# Patient Record
Sex: FEMALE | Race: WHITE | NOT HISPANIC OR LATINO | Employment: PART TIME | URBAN - NONMETROPOLITAN AREA
[De-identification: names, ages, dates, MRNs, and addresses within clinical notes are randomized per-mention and may not be internally consistent; named-entity substitution may affect disease eponyms.]

---

## 2017-02-17 ENCOUNTER — OFFICE VISIT (OUTPATIENT)
Dept: CHIROPRACTIC MEDICINE | Facility: OTHER | Age: 57
End: 2017-02-17
Attending: CHIROPRACTOR
Payer: COMMERCIAL

## 2017-02-17 DIAGNOSIS — M99.03 SEGMENTAL AND SOMATIC DYSFUNCTION OF LUMBAR REGION: Primary | ICD-10-CM

## 2017-02-17 DIAGNOSIS — M54.50 ACUTE BILATERAL LOW BACK PAIN WITHOUT SCIATICA: ICD-10-CM

## 2017-02-17 DIAGNOSIS — M99.02 SEGMENTAL AND SOMATIC DYSFUNCTION OF THORACIC REGION: ICD-10-CM

## 2017-02-17 PROCEDURE — 98940 CHIROPRACT MANJ 1-2 REGIONS: CPT | Mod: AT | Performed by: CHIROPRACTOR

## 2017-02-17 NOTE — MR AVS SNAPSHOT
"              After Visit Summary   2017    Niesha Lyle    MRN: [id number]           Patient Information     Date Of Birth          1960        Visit Information        Provider Department      2017 10:50 AM Felipe Morgan DC  [facility] [place]        Today's Diagnoses     Segmental and somatic dysfunction of lumbar region    -  1    Acute bilateral low back pain without sciatica        Segmental and somatic dysfunction of thoracic region           Follow-ups after your visit        Who to contact     If you have questions or need follow up information about today's clinic visit or your schedule please contact  [facility] [place] directly at [phone].  Normal or non-critical lab and imaging results will be communicated to you by Sootoo.comhart, letter or phone within 4 business days after the clinic has received the results. If you do not hear from us within 7 days, please contact the clinic through Sootoo.comhart or phone. If you have a critical or abnormal lab result, we will notify you by phone as soon as possible.  Submit refill requests through miiCard or call your pharmacy and they will forward the refill request to us. Please allow 3 business days for your refill to be completed.          Additional Information About Your Visit        MyChart Information     miiCard lets you send messages to your doctor, view your test results, renew your prescriptions, schedule appointments and more. To sign up, go to [email or web address] . Click on \"Log in\" on the left side of the screen, which will take you to the Welcome page. Then click on \"Sign up Now\" on the right side of the page.     You will be asked to enter the access code listed below, as well as some personal information. Please follow the directions to create your username and password.     Your access code is: [email or web address]  Expires: 2017  8:05 AM     Your access code will  in 90 days. If you need help or a new code, please " call your [place] clinic or [phone].        Care EveryWhere ID     This is your Care EveryWhere ID. This could be used by other organizations to access your [place] medical records  [id number]         Blood Pressure from Last 3 Encounters:   08/27/16 (!) 170/102   08/24/16 (!) 166/101   10/06/15 155/95    Weight from Last 3 Encounters:   03/16/15 162 lb (73.5 kg)              We Performed the Following     CHIROPRAC MANIP,SPINAL,1-2 REGIONS        Primary Care Provider Office Phone # Fax #    Cedric Giles [phone]       [facility] [address] [facility] [address]        Thank you!     Thank you for choosing  [facility]  for your care. Our goal is always to provide you with excellent care. Hearing back from our patients is one way we can continue to improve our services. Please take a few minutes to complete the written survey that you may receive in the mail after your visit with us. Thank you!             Your Updated Medication List - Protect others around you: Learn how to safely use, store and throw away your medicines at www.disposemymeds.org.          This list is accurate as of: 2/17/17 11:59 PM.  Always use your most recent med list.                   Brand Name Dispense Instructions for use    ATENOLOL PO      Take 25 mg by mouth daily       CALCIUM + D PO      Take 1 tablet by mouth daily       CYMBALTA PO      Take by mouth daily       fluticasone 50 MCG/ACT spray    FLONASE    1 Package    Spray 2 sprays into both nostrils daily       HYDROcodone-acetaminophen 5-325 MG per tablet    NORCO    15 tablet    Take 1-2 tablets by mouth every 4 hours as needed for moderate to severe pain       KRILL OIL PO      Take by mouth daily       LISINOPRIL PO      Take 20 mg by mouth daily       SIMVASTATIN PO      Take by mouth At Bedtime       TRAZODONE HCL PO      Take by mouth At Bedtime       TYLENOL PO      Take 1,300 mg by mouth

## 2017-02-21 NOTE — PROGRESS NOTES
Subjective Finding:    Chief compalint: Patient presents with:  Back Pain  , Pain Scale: 4/10, Intensity: dull and ache, Duration: 1 weeks, Radiating: no.    Date of injury:     Activities that the pain restricts:   Home/household/hobbies/social activities: yes.  Work duties: no.  Sleep: no.  Makes symptoms better: rest.  Makes symptoms worse: activity and lumbar flexion.  Have you seen anyone else for the symptoms? no.  Work related: no.  Automobile related injury: no.    Objective and Assessment:    Posture Analysis:   High shoulder: .  Head tilt: .  High iliac crest: .  Head carriage: neutral.  Thoracic Kyphosis: neutral.  Lumbar Lordosis: forward.    Lumbar Range of Motion: extension decreased, left lateral flexion decreased and right lateral flexion decreased.  Cervical Range of Motion: .  Thoracic Range of Motion: extension decreased.  Extremity Range of Motion: .    Palpation:   Quad lumb: bilateral, referred pain: no    Segmental dysfunction pre-treatment and treatment area: T3, L3, L4 and L5.    Assessment post-treatment:  Cervical: .  Thoracic: ROM increased.  Lumbar: ROM increased and pain and tenderness decreased.    Comments: .      Complicating Factors: .    Plan / Procedure:    Treatment plan: PRN.  Instructed patient: stretch as instructed at visit and walk 10 minutes.  Short term goals: increase ROM.  Long term goals: restore normal function.  Prognosis: good.

## 2017-02-23 ENCOUNTER — OFFICE VISIT (OUTPATIENT)
Dept: CHIROPRACTIC MEDICINE | Facility: OTHER | Age: 57
End: 2017-02-23
Attending: CHIROPRACTOR
Payer: COMMERCIAL

## 2017-02-23 DIAGNOSIS — M54.50 ACUTE RIGHT-SIDED LOW BACK PAIN WITHOUT SCIATICA: ICD-10-CM

## 2017-02-23 DIAGNOSIS — M99.02 SEGMENTAL AND SOMATIC DYSFUNCTION OF THORACIC REGION: ICD-10-CM

## 2017-02-23 DIAGNOSIS — M99.03 SEGMENTAL AND SOMATIC DYSFUNCTION OF LUMBAR REGION: Primary | ICD-10-CM

## 2017-02-23 PROCEDURE — 98940 CHIROPRACT MANJ 1-2 REGIONS: CPT | Mod: AT | Performed by: CHIROPRACTOR

## 2017-02-23 NOTE — MR AVS SNAPSHOT
"              After Visit Summary   2017    Niesha Lyle    MRN: [id number]           Patient Information     Date Of Birth          1960        Visit Information        Provider Department      2017 2:10 PM Felipe Morgan DC  [facility]        Today's Diagnoses     Segmental and somatic dysfunction of lumbar region    -  1    Acute right-sided low back pain without sciatica        Segmental and somatic dysfunction of thoracic region           Follow-ups after your visit        Who to contact     If you have questions or need follow up information about today's clinic visit or your schedule please contact  [facility] directly at [phone].  Normal or non-critical lab and imaging results will be communicated to you by Denali Medicalhart, letter or phone within 4 business days after the clinic has received the results. If you do not hear from us within 7 days, please contact the clinic through Denali Medicalhart or phone. If you have a critical or abnormal lab result, we will notify you by phone as soon as possible.  Submit refill requests through Roll20 or call your pharmacy and they will forward the refill request to us. Please allow 3 business days for your refill to be completed.          Additional Information About Your Visit        MyChart Information     Roll20 lets you send messages to your doctor, view your test results, renew your prescriptions, schedule appointments and more. To sign up, go to [email or web address] . Click on \"Log in\" on the left side of the screen, which will take you to the Welcome page. Then click on \"Sign up Now\" on the right side of the page.     You will be asked to enter the access code listed below, as well as some personal information. Please follow the directions to create your username and password.     Your access code is: [email or web address]  Expires: 2017  8:05 AM     Your access code will  in 90 days. If you need help or a new code, please " call your [place] clinic or [phone].        Care EveryWhere ID     This is your Care EveryWhere ID. This could be used by other organizations to access your [place] medical records  [id number]         Blood Pressure from Last 3 Encounters:   08/27/16 (!) 170/102   08/24/16 (!) 166/101   10/06/15 155/95    Weight from Last 3 Encounters:   03/16/15 162 lb (73.5 kg)              We Performed the Following     CHIROPRAC MANIP,SPINAL,1-2 REGIONS        Primary Care Provider Office Phone # Fax #    Cedric Giles [phone]       [facility] [address] [facility] [address]        Thank you!     Thank you for choosing  [facility]  for your care. Our goal is always to provide you with excellent care. Hearing back from our patients is one way we can continue to improve our services. Please take a few minutes to complete the written survey that you may receive in the mail after your visit with us. Thank you!             Your Updated Medication List - Protect others around you: Learn how to safely use, store and throw away your medicines at www.disposemymeds.org.          This list is accurate as of: 2/23/17 11:59 PM.  Always use your most recent med list.                   Brand Name Dispense Instructions for use    ATENOLOL PO      Take 25 mg by mouth daily       CALCIUM + D PO      Take 1 tablet by mouth daily       CYMBALTA PO      Take by mouth daily       fluticasone 50 MCG/ACT spray    FLONASE    1 Package    Spray 2 sprays into both nostrils daily       HYDROcodone-acetaminophen 5-325 MG per tablet    NORCO    15 tablet    Take 1-2 tablets by mouth every 4 hours as needed for moderate to severe pain       KRILL OIL PO      Take by mouth daily       LISINOPRIL PO      Take 20 mg by mouth daily       SIMVASTATIN PO      Take by mouth At Bedtime       TRAZODONE HCL PO      Take by mouth At Bedtime       TYLENOL PO      Take 1,300 mg by mouth

## 2017-02-24 NOTE — PROGRESS NOTES
Subjective Finding:    Chief compalint: Patient presents with:  Back Pain: right sided  , Pain Scale: 4/10, Intensity: dull and ache, Duration: 1 weeks, Radiating: no.    Date of injury:     Activities that the pain restricts:   Home/household/hobbies/social activities: yes.  Work duties: no.  Sleep: no.  Makes symptoms better: rest.  Makes symptoms worse: activity and lumbar flexion.  Have you seen anyone else for the symptoms? no.  Work related: no.  Automobile related injury: no.    Objective and Assessment:    Posture Analysis:   High shoulder: .  Head tilt: .  High iliac crest: .  Head carriage: neutral.  Thoracic Kyphosis: neutral.  Lumbar Lordosis: forward.    Lumbar Range of Motion: extension decreased, left lateral flexion decreased and right lateral flexion decreased.  Cervical Range of Motion: .  Thoracic Range of Motion: extension decreased.  Extremity Range of Motion: .    Palpation:   Quad lumb: bilateral, referred pain: no    Segmental dysfunction pre-treatment and treatment area: T3, L3, L4 and L5.    Assessment post-treatment:  Cervical: .  Thoracic: ROM increased.  Lumbar: ROM increased and pain and tenderness decreased.    Comments: .      Complicating Factors: .    Plan / Procedure:    Treatment plan: PRN.  Instructed patient: stretch as instructed at visit and walk 10 minutes.  Short term goals: increase ROM.  Long term goals: restore normal function.  Prognosis: good.

## 2017-03-02 ENCOUNTER — OFFICE VISIT (OUTPATIENT)
Dept: CHIROPRACTIC MEDICINE | Facility: OTHER | Age: 57
End: 2017-03-02
Attending: CHIROPRACTOR
Payer: COMMERCIAL

## 2017-03-02 DIAGNOSIS — M54.41 ACUTE RIGHT-SIDED LOW BACK PAIN WITH RIGHT-SIDED SCIATICA: ICD-10-CM

## 2017-03-02 DIAGNOSIS — M99.03 SEGMENTAL AND SOMATIC DYSFUNCTION OF LUMBAR REGION: Primary | ICD-10-CM

## 2017-03-02 DIAGNOSIS — M99.02 SEGMENTAL AND SOMATIC DYSFUNCTION OF THORACIC REGION: ICD-10-CM

## 2017-03-02 PROCEDURE — 98940 CHIROPRACT MANJ 1-2 REGIONS: CPT | Performed by: CHIROPRACTOR

## 2017-03-02 NOTE — MR AVS SNAPSHOT
"              After Visit Summary   3/2/2017    Niesha Lyle    MRN: [id number]           Patient Information     Date Of Birth          1960        Visit Information        Provider Department      3/2/2017 2:00 PM Felipe Morgan DC  [facility]        Today's Diagnoses     Segmental and somatic dysfunction of lumbar region    -  1    Acute right-sided low back pain with right-sided sciatica        Segmental and somatic dysfunction of thoracic region           Follow-ups after your visit        Who to contact     If you have questions or need follow up information about today's clinic visit or your schedule please contact  [facility] directly at [phone].  Normal or non-critical lab and imaging results will be communicated to you by NeurOpticshart, letter or phone within 4 business days after the clinic has received the results. If you do not hear from us within 7 days, please contact the clinic through NeurOpticshart or phone. If you have a critical or abnormal lab result, we will notify you by phone as soon as possible.  Submit refill requests through Booker or call your pharmacy and they will forward the refill request to us. Please allow 3 business days for your refill to be completed.          Additional Information About Your Visit        MyChart Information     Booker lets you send messages to your doctor, view your test results, renew your prescriptions, schedule appointments and more. To sign up, go to [email or web address] . Click on \"Log in\" on the left side of the screen, which will take you to the Welcome page. Then click on \"Sign up Now\" on the right side of the page.     You will be asked to enter the access code listed below, as well as some personal information. Please follow the directions to create your username and password.     Your access code is: [email or web address]  Expires: 2017  8:05 AM     Your access code will  in 90 days. If you need help or a new code, " please call your [place] clinic or [phone].        Care EveryWhere ID     This is your Care EveryWhere ID. This could be used by other organizations to access your [place] medical records  [id number]         Blood Pressure from Last 3 Encounters:   08/27/16 (!) 170/102   08/24/16 (!) 166/101   10/06/15 155/95    Weight from Last 3 Encounters:   03/16/15 162 lb (73.5 kg)              We Performed the Following     CHIROPRAC MANIP,SPINAL,1-2 REGIONS        Primary Care Provider Office Phone # Fax #    Cedric Giles [phone]       [facility] [address] [facility] [address]        Thank you!     Thank you for choosing  [facility]  for your care. Our goal is always to provide you with excellent care. Hearing back from our patients is one way we can continue to improve our services. Please take a few minutes to complete the written survey that you may receive in the mail after your visit with us. Thank you!             Your Updated Medication List - Protect others around you: Learn how to safely use, store and throw away your medicines at www.disposemymeds.org.          This list is accurate as of: 3/2/17 11:59 PM.  Always use your most recent med list.                   Brand Name Dispense Instructions for use    ATENOLOL PO      Take 25 mg by mouth daily       CALCIUM + D PO      Take 1 tablet by mouth daily       CYMBALTA PO      Take by mouth daily       fluticasone 50 MCG/ACT spray    FLONASE    1 Package    Spray 2 sprays into both nostrils daily       HYDROcodone-acetaminophen 5-325 MG per tablet    NORCO    15 tablet    Take 1-2 tablets by mouth every 4 hours as needed for moderate to severe pain       KRILL OIL PO      Take by mouth daily       LISINOPRIL PO      Take 20 mg by mouth daily       SIMVASTATIN PO      Take by mouth At Bedtime       TRAZODONE HCL PO      Take by mouth At Bedtime       TYLENOL PO      Take 1,300 mg by mouth

## 2017-03-13 ENCOUNTER — OFFICE VISIT (OUTPATIENT)
Dept: CHIROPRACTIC MEDICINE | Facility: OTHER | Age: 57
End: 2017-03-13
Attending: CHIROPRACTOR
Payer: COMMERCIAL

## 2017-03-13 DIAGNOSIS — M99.02 SEGMENTAL AND SOMATIC DYSFUNCTION OF THORACIC REGION: ICD-10-CM

## 2017-03-13 DIAGNOSIS — M54.41 ACUTE RIGHT-SIDED LOW BACK PAIN WITH RIGHT-SIDED SCIATICA: ICD-10-CM

## 2017-03-13 DIAGNOSIS — M99.03 SEGMENTAL AND SOMATIC DYSFUNCTION OF LUMBAR REGION: Primary | ICD-10-CM

## 2017-03-13 PROCEDURE — 98940 CHIROPRACT MANJ 1-2 REGIONS: CPT | Performed by: CHIROPRACTOR

## 2017-03-13 NOTE — MR AVS SNAPSHOT
"              After Visit Summary   3/13/2017    Niesha Lyle    MRN: [id number]           Patient Information     Date Of Birth          1960        Visit Information        Provider Department      3/13/2017 11:20 AM Felipe Morgan DC  [facility] [place]        Today's Diagnoses     Segmental and somatic dysfunction of lumbar region    -  1    Acute right-sided low back pain with right-sided sciatica        Segmental and somatic dysfunction of thoracic region           Follow-ups after your visit        Who to contact     If you have questions or need follow up information about today's clinic visit or your schedule please contact  [facility] directly at [phone].  Normal or non-critical lab and imaging results will be communicated to you by Experts 911hart, letter or phone within 4 business days after the clinic has received the results. If you do not hear from us within 7 days, please contact the clinic through Experts 911hart or phone. If you have a critical or abnormal lab result, we will notify you by phone as soon as possible.  Submit refill requests through Caarbon or call your pharmacy and they will forward the refill request to us. Please allow 3 business days for your refill to be completed.          Additional Information About Your Visit        MyChart Information     Caarbon lets you send messages to your doctor, view your test results, renew your prescriptions, schedule appointments and more. To sign up, go to [email or web address] . Click on \"Log in\" on the left side of the screen, which will take you to the Welcome page. Then click on \"Sign up Now\" on the right side of the page.     You will be asked to enter the access code listed below, as well as some personal information. Please follow the directions to create your username and password.     Your access code is: [email or web address]  Expires: 2017  9:05 AM     Your access code will  in 90 days. If you need help or a new " code, please call your [place] clinic or [phone].        Care EveryWhere ID     This is your Care EveryWhere ID. This could be used by other organizations to access your [place] medical records  [id number]         Blood Pressure from Last 3 Encounters:   08/27/16 (!) 170/102   08/24/16 (!) 166/101   10/06/15 155/95    Weight from Last 3 Encounters:   03/16/15 162 lb (73.5 kg)              We Performed the Following     CHIROPRAC MANIP,SPINAL,1-2 REGIONS        Primary Care Provider Office Phone # Fax #    Cedric Giles [phone]       [facility] [address] [facility] [address]        Thank you!     Thank you for choosing  [facility]  for your care. Our goal is always to provide you with excellent care. Hearing back from our patients is one way we can continue to improve our services. Please take a few minutes to complete the written survey that you may receive in the mail after your visit with us. Thank you!             Your Updated Medication List - Protect others around you: Learn how to safely use, store and throw away your medicines at www.disposemymeds.org.          This list is accurate as of: 3/13/17 11:59 PM.  Always use your most recent med list.                   Brand Name Dispense Instructions for use    ATENOLOL PO      Take 25 mg by mouth daily       CALCIUM + D PO      Take 1 tablet by mouth daily       CYMBALTA PO      Take by mouth daily       fluticasone 50 MCG/ACT spray    FLONASE    1 Package    Spray 2 sprays into both nostrils daily       HYDROcodone-acetaminophen 5-325 MG per tablet    NORCO    15 tablet    Take 1-2 tablets by mouth every 4 hours as needed for moderate to severe pain       KRILL OIL PO      Take by mouth daily       LISINOPRIL PO      Take 20 mg by mouth daily       SIMVASTATIN PO      Take by mouth At Bedtime       TRAZODONE HCL PO      Take by mouth At Bedtime       TYLENOL PO      Take 1,300 mg by mouth

## 2017-03-15 ENCOUNTER — OFFICE VISIT (OUTPATIENT)
Dept: CHIROPRACTIC MEDICINE | Facility: OTHER | Age: 57
End: 2017-03-15
Attending: CHIROPRACTOR
Payer: COMMERCIAL

## 2017-03-15 DIAGNOSIS — M99.02 SEGMENTAL AND SOMATIC DYSFUNCTION OF THORACIC REGION: ICD-10-CM

## 2017-03-15 DIAGNOSIS — M54.50 ACUTE RIGHT-SIDED LOW BACK PAIN WITHOUT SCIATICA: ICD-10-CM

## 2017-03-15 DIAGNOSIS — M99.03 SEGMENTAL AND SOMATIC DYSFUNCTION OF LUMBAR REGION: Primary | ICD-10-CM

## 2017-03-15 PROCEDURE — 98940 CHIROPRACT MANJ 1-2 REGIONS: CPT | Mod: AT | Performed by: CHIROPRACTOR

## 2017-03-15 NOTE — MR AVS SNAPSHOT
"              After Visit Summary   3/15/2017    Niesha Lyle    MRN: [id number]           Patient Information     Date Of Birth          1960        Visit Information        Provider Department      3/15/2017 8:40 AM Felipe Morgan DC [facility]        Today's Diagnoses     Segmental and somatic dysfunction of lumbar region    -  1    Acute right-sided low back pain without sciatica        Segmental and somatic dysfunction of thoracic region           Follow-ups after your visit        Your next 10 appointments already scheduled     Mar 17, 2017  8:50 AM CDT   Return Visit with TIERA Juarez ([facility])    [address]  [facility] [address]   [phone]              Who to contact     If you have questions or need follow up information about today's clinic visit or your schedule please contact  [facility] directly at [phone].  Normal or non-critical lab and imaging results will be communicated to you by MyChart, letter or phone within 4 business days after the clinic has received the results. If you do not hear from us within 7 days, please contact the clinic through Unyqehart or phone. If you have a critical or abnormal lab result, we will notify you by phone as soon as possible.  Submit refill requests through Cherrish or call your pharmacy and they will forward the refill request to us. Please allow 3 business days for your refill to be completed.          Additional Information About Your Visit        MyChart Information     Cherrish lets you send messages to your doctor, view your test results, renew your prescriptions, schedule appointments and more. To sign up, go to [email or web address] . Click on \"Log in\" on the left side of the screen, which will take you to the Welcome page. Then click on \"Sign up Now\" on the right side of the page.     You will be asked to enter the access code listed below, as well as some personal " information. Please follow the directions to create your username and password.     Your access code is: [email or web address]  Expires: 2017  9:05 AM     Your access code will  in 90 days. If you need help or a new code, please call your [place] clinic or [phone].        Care EveryWhere ID     This is your Care EveryWhere ID. This could be used by other organizations to access your [place] medical records  [id number]         Blood Pressure from Last 3 Encounters:   16 (!) 170/102   16 (!) 166/101   10/06/15 155/95    Weight from Last 3 Encounters:   03/16/15 162 lb (73.5 kg)              We Performed the Following     CHIROPRAC MANIP,SPINAL,1-2 REGIONS        Primary Care Provider Office Phone # Fax #    Cedric Giles [phone]       [facility] [address] [place] [address]        Thank you!     Thank you for choosing  [facility]  for your care. Our goal is always to provide you with excellent care. Hearing back from our patients is one way we can continue to improve our services. Please take a few minutes to complete the written survey that you may receive in the mail after your visit with us. Thank you!             Your Updated Medication List - Protect others around you: Learn how to safely use, store and throw away your medicines at www.disposemymeds.org.          This list is accurate as of: 3/15/17  2:25 PM.  Always use your most recent med list.                   Brand Name Dispense Instructions for use    ATENOLOL PO      Take 25 mg by mouth daily       CALCIUM + D PO      Take 1 tablet by mouth daily       CYMBALTA PO      Take by mouth daily       fluticasone 50 MCG/ACT spray    FLONASE    1 Package    Spray 2 sprays into both nostrils daily       HYDROcodone-acetaminophen 5-325 MG per tablet    NORCO    15 tablet    Take 1-2 tablets by mouth every 4 hours as needed for moderate to severe pain       KRILL OIL PO      Take by mouth daily        LISINOPRIL PO      Take 20 mg by mouth daily       SIMVASTATIN PO      Take by mouth At Bedtime       TRAZODONE HCL PO      Take by mouth At Bedtime       TYLENOL PO      Take 1,300 mg by mouth

## 2017-03-15 NOTE — PROGRESS NOTES
Subjective Finding:    Chief compalint: Patient presents with:  Back Pain: right leg  , Pain Scale: 4/10, Intensity: dull and ache, Duration: 1 weeks, Radiating: no.    Date of injury:     Activities that the pain restricts:   Home/household/hobbies/social activities: yes.  Work duties: no.  Sleep: no.  Makes symptoms better: rest.  Makes symptoms worse: activity and lumbar flexion.  Have you seen anyone else for the symptoms? no.  Work related: no.  Automobile related injury: no.    Objective and Assessment:    Posture Analysis:   High shoulder: .  Head tilt: .  High iliac crest: .  Head carriage: neutral.  Thoracic Kyphosis: neutral.  Lumbar Lordosis: forward.    Lumbar Range of Motion: extension decreased, left lateral flexion decreased and right lateral flexion decreased.  Cervical Range of Motion: .  Thoracic Range of Motion: extension decreased.  Extremity Range of Motion: .    Palpation:   Quad lumb: bilateral, referred pain: no    Segmental dysfunction pre-treatment and treatment area: T3, L3, L4 and L5.    Assessment post-treatment:  Cervical: .  Thoracic: ROM increased.  Lumbar: ROM increased and pain and tenderness decreased.    Comments: .      Complicating Factors: .    Plan / Procedure:    Treatment plan: PRN.  Instructed patient: stretch as instructed at visit and walk 10 minutes.  Short term goals: increase ROM.  Long term goals: restore normal function.  Prognosis: good.

## 2017-03-17 ENCOUNTER — OFFICE VISIT (OUTPATIENT)
Dept: CHIROPRACTIC MEDICINE | Facility: OTHER | Age: 57
End: 2017-03-17
Attending: CHIROPRACTOR
Payer: COMMERCIAL

## 2017-03-17 DIAGNOSIS — M99.02 SEGMENTAL AND SOMATIC DYSFUNCTION OF THORACIC REGION: ICD-10-CM

## 2017-03-17 DIAGNOSIS — M99.03 SEGMENTAL AND SOMATIC DYSFUNCTION OF LUMBAR REGION: Primary | ICD-10-CM

## 2017-03-17 DIAGNOSIS — M54.41 ACUTE RIGHT-SIDED LOW BACK PAIN WITH RIGHT-SIDED SCIATICA: ICD-10-CM

## 2017-03-17 PROCEDURE — 98940 CHIROPRACT MANJ 1-2 REGIONS: CPT | Performed by: CHIROPRACTOR

## 2017-03-17 NOTE — MR AVS SNAPSHOT
"              After Visit Summary   3/17/2017    Niesha Lyle    MRN: [id number]           Patient Information     Date Of Birth          1960        Visit Information        Provider Department      3/17/2017 8:50 AM Felipe Morgan DC  [facility]        Today's Diagnoses     Segmental and somatic dysfunction of lumbar region    -  1    Acute right-sided low back pain with right-sided sciatica        Segmental and somatic dysfunction of thoracic region           Follow-ups after your visit        Who to contact     If you have questions or need follow up information about today's clinic visit or your schedule please contact  [facility] directly at [phone].  Normal or non-critical lab and imaging results will be communicated to you by [employer], letter or phone within 4 business days after the clinic has received the results. If you do not hear from us within 7 days, please contact the clinic through [employer] or phone. If you have a critical or abnormal lab result, we will notify you by phone as soon as possible.  Submit refill requests through [employer] or call your pharmacy and they will forward the refill request to us. Please allow 3 business days for your refill to be completed.          Additional Information About Your Visit        MyChart Information     [employer] lets you send messages to your doctor, view your test results, renew your prescriptions, schedule appointments and more. To sign up, go to [email or web address] . Click on \"Log in\" on the left side of the screen, which will take you to the Welcome page. Then click on \"Sign up Now\" on the right side of the page.     You will be asked to enter the access code listed below, as well as some personal information. Please follow the directions to create your username and password.     Your access code is: [email or web address]  Expires: 2017  9:05 AM     Your access code will  in 90 days. If you need help or a new " code, please call your [place] clinic or [phone].        Care EveryWhere ID     This is your Care EveryWhere ID. This could be used by other organizations to access your [place] medical records  [id number]         Blood Pressure from Last 3 Encounters:   08/27/16 (!) 170/102   08/24/16 (!) 166/101   10/06/15 155/95    Weight from Last 3 Encounters:   03/16/15 162 lb (73.5 kg)              We Performed the Following     CHIROPRAC MANIP,SPINAL,1-2 REGIONS        Primary Care Provider Office Phone # Fax #    Cedric Giles [phone]       [facility] [address] [facility] [address]        Thank you!     Thank you for choosing  [facility]  for your care. Our goal is always to provide you with excellent care. Hearing back from our patients is one way we can continue to improve our services. Please take a few minutes to complete the written survey that you may receive in the mail after your visit with us. Thank you!             Your Updated Medication List - Protect others around you: Learn how to safely use, store and throw away your medicines at www.disposemymeds.org.          This list is accurate as of: 3/17/17  9:39 AM.  Always use your most recent med list.                   Brand Name Dispense Instructions for use    ATENOLOL PO      Take 25 mg by mouth daily       CALCIUM + D PO      Take 1 tablet by mouth daily       CYMBALTA PO      Take by mouth daily       fluticasone 50 MCG/ACT spray    FLONASE    1 Package    Spray 2 sprays into both nostrils daily       HYDROcodone-acetaminophen 5-325 MG per tablet    NORCO    15 tablet    Take 1-2 tablets by mouth every 4 hours as needed for moderate to severe pain       KRILL OIL PO      Take by mouth daily       LISINOPRIL PO      Take 20 mg by mouth daily       SIMVASTATIN PO      Take by mouth At Bedtime       TRAZODONE HCL PO      Take by mouth At Bedtime       TYLENOL PO      Take 1,300 mg by mouth

## 2017-04-20 DIAGNOSIS — Z12.31 VISIT FOR SCREENING MAMMOGRAM: Primary | ICD-10-CM

## 2017-04-21 ENCOUNTER — MEDICAL CORRESPONDENCE (OUTPATIENT)
Dept: HEALTH INFORMATION MANAGEMENT | Facility: HOSPITAL | Age: 57
End: 2017-04-21

## 2017-04-21 DIAGNOSIS — N64.59 INVERSION OF NIPPLE: ICD-10-CM

## 2017-04-21 DIAGNOSIS — Z12.31 VISIT FOR SCREENING MAMMOGRAM: Primary | ICD-10-CM

## 2017-04-21 PROCEDURE — G0204 DX MAMMO INCL CAD BI: HCPCS | Mod: TC

## 2017-04-21 PROCEDURE — G0279 TOMOSYNTHESIS, MAMMO: HCPCS | Mod: TC

## 2017-05-15 ENCOUNTER — OFFICE VISIT (OUTPATIENT)
Dept: CHIROPRACTIC MEDICINE | Facility: OTHER | Age: 57
End: 2017-05-15
Attending: CHIROPRACTOR
Payer: COMMERCIAL

## 2017-05-15 DIAGNOSIS — M54.50 ACUTE BILATERAL LOW BACK PAIN WITHOUT SCIATICA: ICD-10-CM

## 2017-05-15 DIAGNOSIS — M99.03 SEGMENTAL AND SOMATIC DYSFUNCTION OF LUMBAR REGION: Primary | ICD-10-CM

## 2017-05-15 DIAGNOSIS — M99.02 SEGMENTAL AND SOMATIC DYSFUNCTION OF THORACIC REGION: ICD-10-CM

## 2017-05-15 PROCEDURE — 98940 CHIROPRACT MANJ 1-2 REGIONS: CPT | Mod: AT | Performed by: CHIROPRACTOR

## 2017-05-15 NOTE — MR AVS SNAPSHOT
"              After Visit Summary   5/15/2017    Niesha Lyle    MRN: [id number]           Patient Information     Date Of Birth          1960        Visit Information        Provider Department      5/15/2017 2:00 PM Felipe Morgan DC  [facility]        Today's Diagnoses     Segmental and somatic dysfunction of lumbar region    -  1    Acute bilateral low back pain without sciatica        Segmental and somatic dysfunction of thoracic region           Follow-ups after your visit        Your next 10 appointments already scheduled     May 16, 2017  9:40 AM CDT   Return Visit with Felipe Morgan DC   [facility]ureen Moreno ([facility])    [address]  [facility] [address]   [phone]              Who to contact     If you have questions or need follow up information about today's clinic visit or your schedule please contact  [facility] MAUREEN MORENO directly at [phone].  Normal or non-critical lab and imaging results will be communicated to you by MyChart, letter or phone within 4 business days after the clinic has received the results. If you do not hear from us within 7 days, please contact the clinic through MyChart or phone. If you have a critical or abnormal lab result, we will notify you by phone as soon as possible.  Submit refill requests through [employer] or call your pharmacy and they will forward the refill request to us. Please allow 3 business days for your refill to be completed.          Additional Information About Your Visit        MyChart Information     [employer] lets you send messages to your doctor, view your test results, renew your prescriptions, schedule appointments and more. To sign up, go to [email or web address] . Click on \"Log in\" on the left side of the screen, which will take you to the Welcome page. Then click on \"Sign up Now\" on the right side of the page.     You will be asked to enter the access code listed below, as well as some personal " information. Please follow the directions to create your username and password.     Your access code is: [email or web address]  Expires: 2017  9:05 AM     Your access code will  in 90 days. If you need help or a new code, please call your [place] clinic or [phone].        Care EveryWhere ID     This is your Care EveryWhere ID. This could be used by other organizations to access your [place] medical records  [id number]         Blood Pressure from Last 3 Encounters:   16 (!) 170/102   16 (!) 166/101   10/06/15 155/95    Weight from Last 3 Encounters:   03/16/15 162 lb (73.5 kg)              We Performed the Following     CHIROPRAC MANIP,SPINAL,1-2 REGIONS        Primary Care Provider Office Phone # Fax #    Cedric Giles [phone]       [facility] [address] [place] [address]        Thank you!     Thank you for choosing  [facility]  for your care. Our goal is always to provide you with excellent care. Hearing back from our patients is one way we can continue to improve our services. Please take a few minutes to complete the written survey that you may receive in the mail after your visit with us. Thank you!             Your Updated Medication List - Protect others around you: Learn how to safely use, store and throw away your medicines at www.disposemymeds.org.          This list is accurate as of: 5/15/17 11:59 PM.  Always use your most recent med list.                   Brand Name Dispense Instructions for use    ATENOLOL PO      Take 25 mg by mouth daily       CALCIUM + D PO      Take 1 tablet by mouth daily       CYMBALTA PO      Take by mouth daily       fluticasone 50 MCG/ACT spray    FLONASE    1 Package    Spray 2 sprays into both nostrils daily       HYDROcodone-acetaminophen 5-325 MG per tablet    NORCO    15 tablet    Take 1-2 tablets by mouth every 4 hours as needed for moderate to severe pain       KRILL OIL PO      Take by mouth daily        LISINOPRIL PO      Take 20 mg by mouth daily       SIMVASTATIN PO      Take by mouth At Bedtime       TRAZODONE HCL PO      Take by mouth At Bedtime       TYLENOL PO      Take 1,300 mg by mouth

## 2017-05-16 ENCOUNTER — OFFICE VISIT (OUTPATIENT)
Dept: CHIROPRACTIC MEDICINE | Facility: OTHER | Age: 57
End: 2017-05-16
Attending: CHIROPRACTOR
Payer: COMMERCIAL

## 2017-05-16 DIAGNOSIS — M99.02 SEGMENTAL AND SOMATIC DYSFUNCTION OF THORACIC REGION: ICD-10-CM

## 2017-05-16 DIAGNOSIS — M99.03 SEGMENTAL AND SOMATIC DYSFUNCTION OF LUMBAR REGION: Primary | ICD-10-CM

## 2017-05-16 DIAGNOSIS — M54.50 ACUTE RIGHT-SIDED LOW BACK PAIN WITHOUT SCIATICA: ICD-10-CM

## 2017-05-16 PROCEDURE — 98940 CHIROPRACT MANJ 1-2 REGIONS: CPT | Performed by: CHIROPRACTOR

## 2017-05-16 NOTE — PROGRESS NOTES
Subjective Finding:    Chief compalint: Patient presents with:  Back Pain  , Pain Scale: 1/10, Intensity: dull and ache, Duration: 1 weeks, Radiating: no.    Date of injury:     Activities that the pain restricts:   Home/household/hobbies/social activities: yes.  Work duties: no.  Sleep: no.  Makes symptoms better: rest.  Makes symptoms worse: activity and lumbar flexion.  Have you seen anyone else for the symptoms? no.  Work related: no.  Automobile related injury: no.    Objective and Assessment:    Posture Analysis:   High shoulder: .  Head tilt: .  High iliac crest: .  Head carriage: neutral.  Thoracic Kyphosis: neutral.  Lumbar Lordosis: forward.    Lumbar Range of Motion: extension decreased, left lateral flexion decreased and right lateral flexion decreased.  Cervical Range of Motion: .  Thoracic Range of Motion: extension decreased.  Extremity Range of Motion: .    Palpation:   Quad lumb: bilateral, referred pain: no    Segmental dysfunction pre-treatment and treatment area: T3, L3, L4 and L5.    Assessment post-treatment:  Cervical: .  Thoracic: ROM increased.  Lumbar: ROM increased and pain and tenderness decreased.    Comments: .      Complicating Factors: .    Plan / Procedure:    Treatment plan: PRN.  Instructed patient: stretch as instructed at visit and walk 10 minutes.  Short term goals: increase ROM.  Long term goals: restore normal function.  Prognosis: good.

## 2017-05-16 NOTE — MR AVS SNAPSHOT
"              After Visit Summary   2017    Niesha Lyle    MRN: [id number]           Patient Information     Date Of Birth          1960        Visit Information        Provider Department      2017 9:40 AM Felipe Morgan DC  [facility]        Today's Diagnoses     Segmental and somatic dysfunction of lumbar region    -  1    Acute right-sided low back pain without sciatica        Segmental and somatic dysfunction of thoracic region           Follow-ups after your visit        Who to contact     If you have questions or need follow up information about today's clinic visit or your schedule please contact  [facility] directly at [phone].  Normal or non-critical lab and imaging results will be communicated to you by [employer], letter or phone within 4 business days after the clinic has received the results. If you do not hear from us within 7 days, please contact the clinic through [employer] or phone. If you have a critical or abnormal lab result, we will notify you by phone as soon as possible.  Submit refill requests through [employer] or call your pharmacy and they will forward the refill request to us. Please allow 3 business days for your refill to be completed.          Additional Information About Your Visit        MyChart Information     [employer] lets you send messages to your doctor, view your test results, renew your prescriptions, schedule appointments and more. To sign up, go to [email or web address] . Click on \"Log in\" on the left side of the screen, which will take you to the Welcome page. Then click on \"Sign up Now\" on the right side of the page.     You will be asked to enter the access code listed below, as well as some personal information. Please follow the directions to create your username and password.     Your access code is: [email or web address]  Expires: 2017  9:05 AM     Your access code will  in 90 days. If you need help or a new code, please " call your [place] clinic or [phone].        Care EveryWhere ID     This is your Care EveryWhere ID. This could be used by other organizations to access your [place] medical records  [id number]         Blood Pressure from Last 3 Encounters:   08/27/16 (!) 170/102   08/24/16 (!) 166/101   10/06/15 155/95    Weight from Last 3 Encounters:   03/16/15 162 lb (73.5 kg)              We Performed the Following     CHIROPRAC MANIP,SPINAL,1-2 REGIONS        Primary Care Provider Office Phone # Fax #    Cedric Giles [phone]       [facility] [address] [facility] [address]        Thank you!     Thank you for choosing  [facility]  for your care. Our goal is always to provide you with excellent care. Hearing back from our patients is one way we can continue to improve our services. Please take a few minutes to complete the written survey that you may receive in the mail after your visit with us. Thank you!             Your Updated Medication List - Protect others around you: Learn how to safely use, store and throw away your medicines at www.disposemymeds.org.          This list is accurate as of: 5/16/17 11:59 PM.  Always use your most recent med list.                   Brand Name Dispense Instructions for use    ATENOLOL PO      Take 25 mg by mouth daily       CALCIUM + D PO      Take 1 tablet by mouth daily       CYMBALTA PO      Take by mouth daily       fluticasone 50 MCG/ACT spray    FLONASE    1 Package    Spray 2 sprays into both nostrils daily       HYDROcodone-acetaminophen 5-325 MG per tablet    NORCO    15 tablet    Take 1-2 tablets by mouth every 4 hours as needed for moderate to severe pain       KRILL OIL PO      Take by mouth daily       LISINOPRIL PO      Take 20 mg by mouth daily       SIMVASTATIN PO      Take by mouth At Bedtime       TRAZODONE HCL PO      Take by mouth At Bedtime       TYLENOL PO      Take 1,300 mg by mouth

## 2017-05-17 NOTE — PROGRESS NOTES
Subjective Finding:    Chief compalint: Patient presents with:  Back Pain: stiffness in low back with leg pain  , Pain Scale: 1/10, Intensity: dull and ache, Duration: 1 weeks, Radiating: no.    Date of injury:     Activities that the pain restricts:   Home/household/hobbies/social activities: yes.  Work duties: no.  Sleep: no.  Makes symptoms better: rest.  Makes symptoms worse: activity and lumbar flexion.  Have you seen anyone else for the symptoms? no.  Work related: no.  Automobile related injury: no.    Objective and Assessment:    Posture Analysis:   High shoulder: .  Head tilt: .  High iliac crest: .  Head carriage: neutral.  Thoracic Kyphosis: neutral.  Lumbar Lordosis: forward.    Lumbar Range of Motion: extension decreased, left lateral flexion decreased and right lateral flexion decreased.  Cervical Range of Motion: .  Thoracic Range of Motion: extension decreased.  Extremity Range of Motion: .    Palpation:   Quad lumb: bilateral, referred pain: no    Segmental dysfunction pre-treatment and treatment area: T3, L3, L4 and L5.    Assessment post-treatment:  Cervical: .  Thoracic: ROM increased.  Lumbar: ROM increased and pain and tenderness decreased.    Comments: .      Complicating Factors: .    Plan / Procedure:    Treatment plan: PRN.  Instructed patient: stretch as instructed at visit and walk 10 minutes.  Short term goals: increase ROM.  Long term goals: restore normal function.  Prognosis: good.

## 2017-05-30 ENCOUNTER — OFFICE VISIT (OUTPATIENT)
Dept: CHIROPRACTIC MEDICINE | Facility: OTHER | Age: 57
End: 2017-05-30
Attending: CHIROPRACTOR
Payer: COMMERCIAL

## 2017-05-30 DIAGNOSIS — M54.50 ACUTE BILATERAL LOW BACK PAIN WITHOUT SCIATICA: ICD-10-CM

## 2017-05-30 DIAGNOSIS — M99.02 SEGMENTAL AND SOMATIC DYSFUNCTION OF THORACIC REGION: ICD-10-CM

## 2017-05-30 DIAGNOSIS — M99.03 SEGMENTAL AND SOMATIC DYSFUNCTION OF LUMBAR REGION: Primary | ICD-10-CM

## 2017-05-30 PROCEDURE — 98940 CHIROPRACT MANJ 1-2 REGIONS: CPT | Mod: AT | Performed by: CHIROPRACTOR

## 2017-05-30 NOTE — MR AVS SNAPSHOT
"              After Visit Summary   2017    Niesha Lyle    MRN: [id number]           Patient Information     Date Of Birth          1960        Visit Information        Provider Department      2017 11:30 AM Felipe Morgan DC  [facility]        Today's Diagnoses     Segmental and somatic dysfunction of lumbar region    -  1    Acute bilateral low back pain without sciatica        Segmental and somatic dysfunction of thoracic region           Follow-ups after your visit        Who to contact     If you have questions or need follow up information about today's clinic visit or your schedule please contact  [facility] directly at [phone].  Normal or non-critical lab and imaging results will be communicated to you by [employer], letter or phone within 4 business days after the clinic has received the results. If you do not hear from us within 7 days, please contact the clinic through [employer] or phone. If you have a critical or abnormal lab result, we will notify you by phone as soon as possible.  Submit refill requests through [employer] or call your pharmacy and they will forward the refill request to us. Please allow 3 business days for your refill to be completed.          Additional Information About Your Visit        MyChart Information     [employer] lets you send messages to your doctor, view your test results, renew your prescriptions, schedule appointments and more. To sign up, go to [email or web address] . Click on \"Log in\" on the left side of the screen, which will take you to the Welcome page. Then click on \"Sign up Now\" on the right side of the page.     You will be asked to enter the access code listed below, as well as some personal information. Please follow the directions to create your username and password.     Your access code is: [email or web address]  Expires: 2017  9:18 AM     Your access code will  in 90 days. If you need help or a new code, please " call your [place] clinic or [phone].        Care EveryWhere ID     This is your Care EveryWhere ID. This could be used by other organizations to access your [place] medical records  [id number]         Blood Pressure from Last 3 Encounters:   08/27/16 (!) 170/102   08/24/16 (!) 166/101   10/06/15 155/95    Weight from Last 3 Encounters:   03/16/15 162 lb (73.5 kg)              We Performed the Following     CHIROPRAC MANIP,SPINAL,1-2 REGIONS        Primary Care Provider Office Phone # Fax #    Cedric Giles [phone]       [facility] [address] [facility] [address]        Thank you!     Thank you for choosing  [facility]  for your care. Our goal is always to provide you with excellent care. Hearing back from our patients is one way we can continue to improve our services. Please take a few minutes to complete the written survey that you may receive in the mail after your visit with us. Thank you!             Your Updated Medication List - Protect others around you: Learn how to safely use, store and throw away your medicines at www.disposemymeds.org.          This list is accurate as of: 5/30/17 11:59 PM.  Always use your most recent med list.                   Brand Name Dispense Instructions for use    ATENOLOL PO      Take 25 mg by mouth daily       CALCIUM + D PO      Take 1 tablet by mouth daily       CYMBALTA PO      Take by mouth daily       fluticasone 50 MCG/ACT spray    FLONASE    1 Package    Spray 2 sprays into both nostrils daily       HYDROcodone-acetaminophen 5-325 MG per tablet    NORCO    15 tablet    Take 1-2 tablets by mouth every 4 hours as needed for moderate to severe pain       KRILL OIL PO      Take by mouth daily       LISINOPRIL PO      Take 20 mg by mouth daily       SIMVASTATIN PO      Take by mouth At Bedtime       TRAZODONE HCL PO      Take by mouth At Bedtime       TYLENOL PO      Take 1,300 mg by mouth

## 2017-06-06 ENCOUNTER — OFFICE VISIT (OUTPATIENT)
Dept: CHIROPRACTIC MEDICINE | Facility: OTHER | Age: 57
End: 2017-06-06
Attending: CHIROPRACTOR
Payer: COMMERCIAL

## 2017-06-06 DIAGNOSIS — M99.02 SEGMENTAL AND SOMATIC DYSFUNCTION OF THORACIC REGION: ICD-10-CM

## 2017-06-06 DIAGNOSIS — M54.41 ACUTE RIGHT-SIDED LOW BACK PAIN WITH RIGHT-SIDED SCIATICA: ICD-10-CM

## 2017-06-06 DIAGNOSIS — M99.03 SEGMENTAL AND SOMATIC DYSFUNCTION OF LUMBAR REGION: Primary | ICD-10-CM

## 2017-06-06 PROCEDURE — 98940 CHIROPRACT MANJ 1-2 REGIONS: CPT | Mod: AT | Performed by: CHIROPRACTOR

## 2017-06-06 NOTE — MR AVS SNAPSHOT
"              After Visit Summary   2017    Niesha Lyle    MRN: [id number]           Patient Information     Date Of Birth          1960        Visit Information        Provider Department      2017 1:00 PM Felipe Morgan DC  [facility] [place]        Today's Diagnoses     Segmental and somatic dysfunction of lumbar region    -  1    Acute right-sided low back pain with right-sided sciatica        Segmental and somatic dysfunction of thoracic region           Follow-ups after your visit        Who to contact     If you have questions or need follow up information about today's clinic visit or your schedule please contact  [facility] directly at [phone].  Normal or non-critical lab and imaging results will be communicated to you by Arcivrhart, letter or phone within 4 business days after the clinic has received the results. If you do not hear from us within 7 days, please contact the clinic through Arcivrhart or phone. If you have a critical or abnormal lab result, we will notify you by phone as soon as possible.  Submit refill requests through "[employer]" or call your pharmacy and they will forward the refill request to us. Please allow 3 business days for your refill to be completed.          Additional Information About Your Visit        MyChart Information     "[employer]" lets you send messages to your doctor, view your test results, renew your prescriptions, schedule appointments and more. To sign up, go to [email or web address]" . Click on \"Log in\" on the left side of the screen, which will take you to the Welcome page. Then click on \"Sign up Now\" on the right side of the page.     You will be asked to enter the access code listed below, as well as some personal information. Please follow the directions to create your username and password.     Your access code is: [email or web address]  Expires: 2017  9:18 AM     Your access code will  in 90 days. If you need help or a new code, " please call your [place] clinic or [phone].        Care EveryWhere ID     This is your Care EveryWhere ID. This could be used by other organizations to access your [place] medical records  [id number]         Blood Pressure from Last 3 Encounters:   08/27/16 (!) 170/102   08/24/16 (!) 166/101   10/06/15 155/95    Weight from Last 3 Encounters:   03/16/15 162 lb (73.5 kg)              We Performed the Following     CHIROPRAC MANIP,SPINAL,1-2 REGIONS        Primary Care Provider Office Phone # Fax #    Cedric Giles [phone]       [facility] [address] [facility] [address]        Thank you!     Thank you for choosing  [facility]  for your care. Our goal is always to provide you with excellent care. Hearing back from our patients is one way we can continue to improve our services. Please take a few minutes to complete the written survey that you may receive in the mail after your visit with us. Thank you!             Your Updated Medication List - Protect others around you: Learn how to safely use, store and throw away your medicines at www.disposemymeds.org.          This list is accurate as of: 6/6/17 11:59 PM.  Always use your most recent med list.                   Brand Name Dispense Instructions for use    ATENOLOL PO      Take 25 mg by mouth daily       CALCIUM + D PO      Take 1 tablet by mouth daily       CYMBALTA PO      Take by mouth daily       fluticasone 50 MCG/ACT spray    FLONASE    1 Package    Spray 2 sprays into both nostrils daily       HYDROcodone-acetaminophen 5-325 MG per tablet    NORCO    15 tablet    Take 1-2 tablets by mouth every 4 hours as needed for moderate to severe pain       KRILL OIL PO      Take by mouth daily       LISINOPRIL PO      Take 20 mg by mouth daily       SIMVASTATIN PO      Take by mouth At Bedtime       TRAZODONE HCL PO      Take by mouth At Bedtime       TYLENOL PO      Take 1,300 mg by mouth

## 2017-06-22 ENCOUNTER — OFFICE VISIT (OUTPATIENT)
Dept: CHIROPRACTIC MEDICINE | Facility: OTHER | Age: 57
End: 2017-06-22
Attending: CHIROPRACTOR
Payer: COMMERCIAL

## 2017-06-22 DIAGNOSIS — M99.02 SEGMENTAL AND SOMATIC DYSFUNCTION OF THORACIC REGION: ICD-10-CM

## 2017-06-22 DIAGNOSIS — M99.03 SEGMENTAL AND SOMATIC DYSFUNCTION OF LUMBAR REGION: Primary | ICD-10-CM

## 2017-06-22 DIAGNOSIS — M54.50 ACUTE BILATERAL LOW BACK PAIN WITHOUT SCIATICA: ICD-10-CM

## 2017-06-22 PROCEDURE — 98940 CHIROPRACT MANJ 1-2 REGIONS: CPT | Mod: AT | Performed by: CHIROPRACTOR

## 2017-06-22 NOTE — MR AVS SNAPSHOT
"              After Visit Summary   2017    Niesha Lyle    MRN: [id number]           Patient Information     Date Of Birth          1960        Visit Information        Provider Department      2017 4:00 PM Felipe Morgan DC  [facility] [place]        Today's Diagnoses     Segmental and somatic dysfunction of lumbar region    -  1    Acute bilateral low back pain without sciatica        Segmental and somatic dysfunction of thoracic region           Follow-ups after your visit        Who to contact     If you have questions or need follow up information about today's clinic visit or your schedule please [facility]  [facility] directly at [phone].  Normal or non-critical lab and imaging results will be communicated to you by GottaParkhart, letter or phone within 4 business days after the clinic has received the results. If you do not hear from us within 7 days, please contact the clinic through GottaParkhart or phone. If you have a critical or abnormal lab result, we will notify you by phone as soon as possible.  Submit refill requests through [email or web address] or call your pharmacy and they will forward the refill request to us. Please allow 3 business days for your refill to be completed.          Additional Information About Your Visit        MyChart Information     [email or web address] lets you send messages to your doctor, view your test results, renew your prescriptions, schedule appointments and more. To sign up, go to [email or web address] . Click on \"Log in\" on the left side of the screen, which will take you to the Welcome page. Then click on \"Sign up Now\" on the right side of the page.     You will be asked to enter the access code listed below, as well as some personal information. Please follow the directions to create your username and password.     Your access code is: [email or web address]  Expires: 2017  9:18 AM     Your access code will  in 90 days. If you need help or a new code, please " call your [place] clinic or [phone].        Care EveryWhere ID     This is your Care EveryWhere ID. This could be used by other organizations to access your [place] medical records  [id number]         Blood Pressure from Last 3 Encounters:   08/27/16 (!) 170/102   08/24/16 (!) 166/101   10/06/15 155/95    Weight from Last 3 Encounters:   03/16/15 162 lb (73.5 kg)              We Performed the Following     CHIROPRAC MANIP,SPINAL,1-2 REGIONS        Primary Care Provider Office Phone # Fax #    Cedric Giles [phone]       [facility] [address] [facility] [address]        Equal Access to Services     ARNULFO DEVINE : Hadii aad ku hadasho Soomaali, waaxda luqadaha, qaybta kaalmada adeegyada, janet lucero . So [facility] [phone].    ATENCIÓN: Si habla español, tiene a walker disposición servicios gratuitos de asistencia lingüística. Llame al [phone].    We comply with applicable federal civil rights laws and [place] laws. We do not discriminate on the basis of race, color, national origin, age, disability sex, sexual orientation or gender identity.            Thank you!     Thank you for choosing  [facility]  for your care. Our goal is always to provide you with excellent care. Hearing back from our patients is one way we can continue to improve our services. Please take a few minutes to complete the written survey that you may receive in the mail after your visit with us. Thank you!             Your Updated Medication List - Protect others around you: Learn how to safely use, store and throw away your medicines at www.disposemymeds.org.          This list is accurate as of: 6/22/17 11:59 PM.  Always use your most recent med list.                   Brand Name Dispense Instructions for use Diagnosis    ATENOLOL PO      Take 25 mg by mouth daily        CALCIUM + D PO      Take 1 tablet by mouth daily        CYMBALTA PO      Take by mouth daily         fluticasone 50 MCG/ACT spray    FLONASE    1 Package    Spray 2 sprays into both nostrils daily        HYDROcodone-acetaminophen 5-325 MG per tablet    NORCO    15 tablet    Take 1-2 tablets by mouth every 4 hours as needed for moderate to severe pain        KRILL OIL PO      Take by mouth daily        LISINOPRIL PO      Take 20 mg by mouth daily        SIMVASTATIN PO      Take by mouth At Bedtime        TRAZODONE HCL PO      Take by mouth At Bedtime        TYLENOL PO      Take 1,300 mg by mouth

## 2017-06-27 NOTE — PROGRESS NOTES
Subjective Finding:    Chief compalint: Patient presents with:  Back Pain  Neck Pain  , Pain Scale: 1/10, Intensity: dull and ache, Duration: 1 weeks, Radiating: no.    Date of injury:     Activities that the pain restricts:   Home/household/hobbies/social activities: yes.  Work duties: no.  Sleep: no.  Makes symptoms better: rest.  Makes symptoms worse: activity and lumbar flexion.  Have you seen anyone else for the symptoms? no.  Work related: no.  Automobile related injury: no.    Objective and Assessment:    Posture Analysis:   High shoulder: .  Head tilt: .  High iliac crest: .  Head carriage: neutral.  Thoracic Kyphosis: neutral.  Lumbar Lordosis: forward.    Lumbar Range of Motion: extension decreased, left lateral flexion decreased and right lateral flexion decreased.  Cervical Range of Motion: .  Thoracic Range of Motion: extension decreased.  Extremity Range of Motion: .    Palpation:   Quad lumb: bilateral, referred pain: no    Segmental dysfunction pre-treatment and treatment area: T3, L3, L4 and L5.    Assessment post-treatment:  Cervical: .  Thoracic: ROM increased.  Lumbar: ROM increased and pain and tenderness decreased.    Comments: .      Complicating Factors: .    Plan / Procedure:    Treatment plan: PRN.  Instructed patient: stretch as instructed at visit and walk 10 minutes.  Short term goals: increase ROM.  Long term goals: restore normal function.  Prognosis: good.

## 2017-08-14 ENCOUNTER — OFFICE VISIT (OUTPATIENT)
Dept: CHIROPRACTIC MEDICINE | Facility: OTHER | Age: 57
End: 2017-08-14
Attending: CHIROPRACTOR
Payer: COMMERCIAL

## 2017-08-14 DIAGNOSIS — M99.02 SEGMENTAL AND SOMATIC DYSFUNCTION OF THORACIC REGION: ICD-10-CM

## 2017-08-14 DIAGNOSIS — M99.03 SEGMENTAL AND SOMATIC DYSFUNCTION OF LUMBAR REGION: Primary | ICD-10-CM

## 2017-08-14 DIAGNOSIS — M54.50 ACUTE BILATERAL LOW BACK PAIN WITHOUT SCIATICA: ICD-10-CM

## 2017-08-14 PROCEDURE — 99211 OFF/OP EST MAY X REQ PHY/QHP: CPT | Mod: 25 | Performed by: CHIROPRACTOR

## 2017-08-14 PROCEDURE — 98940 CHIROPRACT MANJ 1-2 REGIONS: CPT | Performed by: CHIROPRACTOR

## 2017-08-14 NOTE — PROGRESS NOTES
Subjective Finding:    Chief compalint: Patient presents with:  Back Pain  , Pain Scale: 1/10, Intensity: dull and ache, Duration: 1 weeks, Radiating: no.    Date of injury:     Activities that the pain restricts:   Home/household/hobbies/social activities: yes.  Work duties: no.  Sleep: no.  Makes symptoms better: rest.  Makes symptoms worse: activity and lumbar flexion.  Have you seen anyone else for the symptoms? no.  Work related: .  Automobile related injury: no.    Objective and Assessment:    Posture Analysis:   High shoulder: .  Head tilt: .  High iliac crest: .  Head carriage: neutral.  Thoracic Kyphosis: neutral.  Lumbar Lordosis: forward.    Lumbar Range of Motion: extension decreased, left lateral flexion decreased and right lateral flexion decreased.  Cervical Range of Motion: .  Thoracic Range of Motion: extension decreased.  Extremity Range of Motion: .    Palpation:   Quad lumb: bilateral, referred pain: no    Segmental dysfunction pre-treatment and treatment area: T3, L3, L4 and L5.    Assessment post-treatment:  Cervical: .  Thoracic: ROM increased.  Lumbar: ROM increased and pain and tenderness decreased.    Comments: .      Complicating Factors: .    Plan / Procedure:    Treatment plan: PRN.  Instructed patient: stretch as instructed at visit and walk 10 minutes.  Short term goals: increase ROM.  Long term goals: restore normal function.  Prognosis: good.

## 2017-08-14 NOTE — MR AVS SNAPSHOT
"              After Visit Summary   2017    Niesha Llye    MRN: [id number]           Patient Information     Date Of Birth          1960        Visit Information        Provider Department      2017 10:30 AM Felipe Morgan DC  [facility] [place]        Today's Diagnoses     Segmental and somatic dysfunction of lumbar region    -  1    Acute bilateral low back pain without sciatica        Segmental and somatic dysfunction of thoracic region           Follow-ups after your visit        Who to contact     If you have questions or need follow up information about today's clinic visit or your schedule please contact  [facility] directly at [phone].  Normal or non-critical lab and imaging results will be communicated to you by ReCyte Therapeuticshart, letter or phone within 4 business days after the clinic has received the results. If you do not hear from us within 7 days, please contact the clinic through ReCyte Therapeuticshart or phone. If you have a critical or abnormal lab result, we will notify you by phone as soon as possible.  Submit refill requests through [email or web address] or call your pharmacy and they will forward the refill request to us. Please allow 3 business days for your refill to be completed.          Additional Information About Your Visit        MyChart Information     [email or web address] lets you send messages to your doctor, view your test results, renew your prescriptions, schedule appointments and more. To sign up, go to [email or web address] . Click on \"Log in\" on the left side of the screen, which will take you to the Welcome page. Then click on \"Sign up Now\" on the right side of the page.     You will be asked to enter the access code listed below, as well as some personal information. Please follow the directions to create your username and password.     Your access code is: [email or web address]  Expires: 2017  9:18 AM     Your access code will  in 90 days. If you need help or a new code, please " call your [place] clinic or [phone].        Care EveryWhere ID     This is your Care EveryWhere ID. This could be used by other organizations to access your [place] medical records  [id number]         Blood Pressure from Last 3 Encounters:   08/27/16 (!) 170/102   08/24/16 (!) 166/101   10/06/15 155/95    Weight from Last 3 Encounters:   03/16/15 162 lb (73.5 kg)              We Performed the Following     CHIROPRAC MANIP,SPINAL,1-2 REGIONS        Primary Care Provider Office Phone # Fax #    Cedric Giles [phone]       [facility] [address] [facility] [address]        Equal Access to Services     ARNULFO DEVINE : Hadii aad ku hadasho Soomaali, waaxda luqadaha, qaybta kaalmada adeegyada, janet lucero . So [facility] [phone].    ATENCIÓN: Si habla español, tiene a walker disposición servicios gratuitos de asistencia lingüística. Llame al [phone].    We comply with applicable federal civil rights laws and [place] laws. We do not discriminate on the basis of race, color, national origin, age, disability sex, sexual orientation or gender identity.            Thank you!     Thank you for choosing  [facility]  for your care. Our goal is always to provide you with excellent care. Hearing back from our patients is one way we can continue to improve our services. Please take a few minutes to complete the written survey that you may receive in the mail after your visit with us. Thank you!             Your Updated Medication List - Protect others around you: Learn how to safely use, store and throw away your medicines at www.disposemymeds.org.          This list is accurate as of: 8/14/17 10:34 AM.  Always use your most recent med list.                   Brand Name Dispense Instructions for use Diagnosis    ATENOLOL PO      Take 25 mg by mouth daily        CALCIUM + D PO      Take 1 tablet by mouth daily        CYMBALTA PO      Take by mouth daily         fluticasone 50 MCG/ACT spray    FLONASE    1 Package    Spray 2 sprays into both nostrils daily        HYDROcodone-acetaminophen 5-325 MG per tablet    NORCO    15 tablet    Take 1-2 tablets by mouth every 4 hours as needed for moderate to severe pain        KRILL OIL PO      Take by mouth daily        LISINOPRIL PO      Take 20 mg by mouth daily        SIMVASTATIN PO      Take by mouth At Bedtime        TRAZODONE HCL PO      Take by mouth At Bedtime        TYLENOL PO      Take 1,300 mg by mouth

## 2017-10-02 ENCOUNTER — HOSPITAL ENCOUNTER (EMERGENCY)
Facility: HOSPITAL | Age: 57
Discharge: HOME | End: 2017-10-02
Attending: NURSE PRACTITIONER | Admitting: NURSE PRACTITIONER
Payer: COMMERCIAL

## 2017-10-02 ENCOUNTER — OFFICE VISIT (OUTPATIENT)
Dept: CHIROPRACTIC MEDICINE | Facility: OTHER | Age: 57
End: 2017-10-02
Attending: CHIROPRACTOR
Payer: COMMERCIAL

## 2017-10-02 VITALS
TEMPERATURE: 98 F | HEART RATE: 77 BPM | RESPIRATION RATE: 16 BRPM | SYSTOLIC BLOOD PRESSURE: 186 MMHG | DIASTOLIC BLOOD PRESSURE: 103 MMHG | OXYGEN SATURATION: 99 %

## 2017-10-02 DIAGNOSIS — J02.0 STREPTOCOCCAL SORE THROAT: ICD-10-CM

## 2017-10-02 DIAGNOSIS — M99.01 SEGMENTAL AND SOMATIC DYSFUNCTION OF CERVICAL REGION: ICD-10-CM

## 2017-10-02 DIAGNOSIS — M54.50 ACUTE RIGHT-SIDED LOW BACK PAIN WITHOUT SCIATICA: ICD-10-CM

## 2017-10-02 DIAGNOSIS — M99.02 SEGMENTAL AND SOMATIC DYSFUNCTION OF THORACIC REGION: ICD-10-CM

## 2017-10-02 DIAGNOSIS — M99.03 SEGMENTAL AND SOMATIC DYSFUNCTION OF LUMBAR REGION: Primary | ICD-10-CM

## 2017-10-02 LAB
DEPRECATED S PYO AG THROAT QL EIA: NORMAL
SPECIMEN SOURCE: NORMAL

## 2017-10-02 PROCEDURE — 87880 STREP A ASSAY W/OPTIC: CPT | Performed by: FAMILY MEDICINE

## 2017-10-02 PROCEDURE — 99213 OFFICE O/P EST LOW 20 MIN: CPT | Performed by: NURSE PRACTITIONER

## 2017-10-02 PROCEDURE — 99213 OFFICE O/P EST LOW 20 MIN: CPT

## 2017-10-02 PROCEDURE — 87081 CULTURE SCREEN ONLY: CPT | Performed by: FAMILY MEDICINE

## 2017-10-02 PROCEDURE — 98941 CHIROPRACT MANJ 3-4 REGIONS: CPT | Mod: AT | Performed by: CHIROPRACTOR

## 2017-10-02 RX ORDER — AMOXICILLIN 500 MG/1
500 CAPSULE ORAL 3 TIMES DAILY
Qty: 30 CAPSULE | Refills: 0 | Status: SHIPPED | OUTPATIENT
Start: 2017-10-02 | End: 2017-10-12

## 2017-10-02 NOTE — MR AVS SNAPSHOT
"              After Visit Summary   10/2/2017    Niesha Lyle    MRN: [id number]           Patient Information     Date Of Birth          1960        Visit Information        Provider Department      10/2/2017 11:10 AM Felipe Morgan DC  [facility] [place] [facility] [place]        Today's Diagnoses     Segmental and somatic dysfunction of lumbar region    -  1    Acute right-sided low back pain without sciatica        Segmental and somatic dysfunction of thoracic region        Segmental and somatic dysfunction of cervical region           Follow-ups after your visit        Who to contact     If you have questions or need follow up information about today's clinic visit or your schedule please contact  [facility] directly at [phone].  Normal or non-critical lab and imaging results will be communicated to you by Lender Sentinelhart, letter or phone within 4 business days after the clinic has received the results. If you do not hear from us within 7 days, please contact the clinic through Lender Sentinelhart or phone. If you have a critical or abnormal lab result, we will notify you by phone as soon as possible.  Submit refill requests through [employer] or call your pharmacy and they will forward the refill request to us. Please allow 3 business days for your refill to be completed.          Additional Information About Your Visit        MyChart Information     [employer] lets you send messages to your doctor, view your test results, renew your prescriptions, schedule appointments and more. To sign up, go to [email or web address] . Click on \"Log in\" on the left side of the screen, which will take you to the Welcome page. Then click on \"Sign up Now\" on the right side of the page.     You will be asked to enter the access code listed below, as well as some personal information. Please follow the directions to create your username and password.     Your access code is: [email or web address]  Expires: 12/31/2017  8:07 PM     Your access code " will  in 90 days. If you need help or a new code, please call your [place] clinic or [phone].        Care EveryWhere ID     This is your Care EveryWhere ID. This could be used by other organizations to access your [place] medical records  [id number]         Blood Pressure from Last 3 Encounters:   10/02/17 (!) 186/103   16 (!) 170/102   16 (!) 166/101    Weight from Last 3 Encounters:   03/16/15 162 lb (73.5 kg)              We Performed the Following     CHIROPRAC MANIP,SPINAL,3-4 REGIONS        Primary Care Provider Office Phone # Fax #    [place] DIANA Donovan [phone]       [facility] [address]  [facility] [address]        Equal Access to Services     ARNULFO DEVINE : Hadii andres leo hadasho Soomaali, waaxda luqadaha, qaybta kaalmada adechrisyasawyer, janet lucero . So [facility] [phone].    ATENCIÓN: Si habla español, tiene a walker disposición servicios gratuitos de asistencia lingüística. Luca al [phone].    We comply with applicable federal civil rights laws and [place] laws. We do not discriminate on the basis of race, color, national origin, age, disability, sex, sexual orientation, or gender identity.            Thank you!     Thank you for choosing  [facility]  for your care. Our goal is always to provide you with excellent care. Hearing back from our patients is one way we can continue to improve our services. Please take a few minutes to complete the written survey that you may receive in the mail after your visit with us. Thank you!             Your Updated Medication List - Protect others around you: Learn how to safely use, store and throw away your medicines at www.disposemymeds.org.          This list is accurate as of: 10/2/17 11:59 PM.  Always use your most recent med list.                   Brand Name Dispense Instructions for use Diagnosis    amoxicillin 500 MG capsule    AMOXIL    30 capsule    Take 1  capsule (500 mg) by mouth 3 times daily for 10 days        ATENOLOL PO      Take 25 mg by mouth daily        CALCIUM + D PO      Take 1 tablet by mouth daily        CYMBALTA PO      Take by mouth daily        fluticasone 50 MCG/ACT spray    FLONASE    1 Package    Spray 2 sprays into both nostrils daily        KRILL OIL PO      Take by mouth daily        LISINOPRIL PO      Take 20 mg by mouth daily        SIMVASTATIN PO      Take by mouth At Bedtime        TRAZODONE HCL PO      Take by mouth At Bedtime        TYLENOL PO      Take 1,300 mg by mouth

## 2017-10-02 NOTE — ED AVS SNAPSHOT
[facility]    [address] [address]    Phone:  [phone]                                       Niesha Lyle   MRN: [id number]    Department:  [facility]   Date of Visit:  10/2/2017           Patient Information     Date Of Birth          1960        Your diagnoses for this visit were:     Streptococcal sore throat        You were seen by Donna Allen NP.      Follow-up Information     Follow up with Venus Donovan NP.    Specialty:  Nurse Practitioner    Why:  As needed, If symptoms worsen    Contact information:    [facility]  [address] [facility] [address] [phone]          Follow up with [facility].    Specialty:  EMERGENCY MEDICINE    Why:  As needed, If symptoms worsen    Contact information:    [address] [address] [phone]    Additional information:    From [place]: Take [address]. Turn left at [address]. Turn left at the first stoplight on [address]. At the first stop sign, take a right onto [address]. Take a left into the parking lot and continue through until you reach the North enterance of the building.       From [place]: Take [address]. Take the [address] ramp towards [place]. Turn left onto [address]. Take a slight right onto [address]. Turn left at the first stoplight on [address]. At the first stop sign, take a right onto [address]. Take a left into the parking lot and continue through until you reach the North enterance of the building.       From [place]: Take [address]. Take a right at [address]. At the first stop sign, take a right onto [address]. Take a left into the parking lot and continue through until you reach the North enterance of the building.         Discharge Instructions       Take antibiotics as ordered.   Eat a yogurt daily while  taking antibiotics.   Take tylenol and or ibuprofen for pain. Follow dosing on package.   Increase water intake.   Throw tooth brush out 4 days after starting antibiotics.   Follow up with PCP with any increase in symptoms or concerns.   Return to urgent care or emergency department with any increase in symptoms or concerns.     Discharge References/Attachments     PHARYNGITIS, STREP (PRESUMED) (ENGLISH)    SORE THROAT, WHEN YOU HAVE A (ENGLISH)    SORE THROATS, SELF-CARE FOR (ENGLISH)         Review of your medicines      START taking        Dose / Directions Last dose taken    amoxicillin 500 MG capsule   Commonly known as:  AMOXIL   Dose:  500 mg   Quantity:  30 capsule        Take 1 capsule (500 mg) by mouth 3 times daily for 10 days   Refills:  0          Our records show that you are taking the medicines listed below. If these are incorrect, please call your family doctor or clinic.        Dose / Directions Last dose taken    ATENOLOL PO   Dose:  25 mg        Take 25 mg by mouth daily   Refills:  0        CALCIUM + D PO   Dose:  1 tablet        Take 1 tablet by mouth daily   Refills:  0        CYMBALTA PO        Take by mouth daily   Refills:  0        fluticasone 50 MCG/ACT spray   Commonly known as:  FLONASE   Dose:  2 spray   Quantity:  1 Package        Spray 2 sprays into both nostrils daily   Refills:  1        KRILL OIL PO        Take by mouth daily   Refills:  0        LISINOPRIL PO   Dose:  20 mg        Take 20 mg by mouth daily   Refills:  0        SIMVASTATIN PO        Take by mouth At Bedtime   Refills:  0        TRAZODONE HCL PO        Take by mouth At Bedtime   Refills:  0        TYLENOL PO   Dose:  1300 mg        Take 1,300 mg by mouth   Refills:  0                Prescriptions were sent or printed at these locations (1 Prescription)                   [facility] [id number] - YENIFER REDDY - [address] AT [facility] [address] [place] [address]    Telephone:   "[phone]   Fax:  [phone]   Hours:                  E-Prescribed (1 of 1)         amoxicillin (AMOXIL) 500 MG capsule                Procedures and tests performed during your visit     Beta strep group A culture    Rapid strep screen      Orders Needing Specimen Collection     None      Pending Results     Date and Time Order Name Status Description    10/2/2017 185 Beta strep group A culture In process             Pending Culture Results     Date and Time Order Name Status Description    10/2/2017 185 Beta strep group A culture In process             Thank you for choosing [facility]       Thank you for choosing [facility] for your care. Our goal is always to provide you with excellent care. Hearing back from our patients is one way we can continue to improve our services. Please take a few minutes to complete the written survey that you may receive in the mail after you visit with us. Thank you!        [employer] Information     [employer] lets you send messages to your doctor, view your test results, renew your prescriptions, schedule appointments and more. To sign up, go to [email or web address] . Click on \"Log in\" on the left side of the screen, which will take you to the Welcome page. Then click on \"Sign up Now\" on the right side of the page.     You will be asked to enter the access code listed below, as well as some personal information. Please follow the directions to create your username and password.     Your access code is: [email or web address]  Expires: 2017  8:07 PM     Your access code will  in 90 days. If you need help or a new code, please call your [facility] clinic or [phone].        Care EveryWhere ID     This is your Care EveryWhere ID. This could be used by other organizations to access your [facility] medical records  [id number]        Equal Access to Services     ARNULFO DEVINE AH: tracy Wylie, janet lei " charlie lucero ah. So [facility] [phone].    ATENCIÓN: Si habla español, tiene a walker disposición servicios gratuitos de asistencia lingüística. Llame al [phone].    We comply with applicable federal civil rights laws and [place] laws. We do not discriminate on the basis of race, color, national origin, age, disability, sex, sexual orientation, or gender identity.            After Visit Summary       This is your record. Keep this with you and show to your community pharmacist(s) and doctor(s) at your next visit.

## 2017-10-02 NOTE — ED AVS SNAPSHOT
[facility]    [address]    [place] [address]    Phone:  [phone]                                       Niesha Lyle   MRN: [id number]    Department:  [facility]   Date of Visit:  10/2/2017           After Visit Summary Signature Page     I have received my discharge instructions, and my questions have been answered. I have discussed any challenges I see with this plan with the nurse or doctor.    ..........................................................................................................................................  Patient/Patient Representative Signature      ..........................................................................................................................................  Patient Representative Print Name and Relationship to Patient    ..................................................               ................................................  Date                                            Time    ..........................................................................................................................................  Reviewed by Signature/Title    ...................................................              ..............................................  Date                                                            Time

## 2017-10-03 ASSESSMENT — ENCOUNTER SYMPTOMS
APPETITE CHANGE: 0
RHINORRHEA: 0
TROUBLE SWALLOWING: 0
COUGH: 0
FEVER: 0
DIARRHEA: 0
PSYCHIATRIC NEGATIVE: 1
SHORTNESS OF BREATH: 0
SINUS PAIN: 0
DYSURIA: 0
SORE THROAT: 1
NAUSEA: 0
WHEEZING: 0
VOMITING: 0
SINUS PRESSURE: 0
STRIDOR: 0
ACTIVITY CHANGE: 0
CHILLS: 0

## 2017-10-03 NOTE — ED PROVIDER NOTES
History     Chief Complaint   Patient presents with     Pharyngitis     The history is provided by the patient. No  was used.     Niesha Lyle is a 57 year old female who presents with a sore throat and hoarse voice that started 2 days ago. No interventions for symptoms. Denies fever, chills, or night sweats. Eating and drinking well. Bowel and bladder are working well. No antibiotic use in the past 30 days.     I have reviewed the Medications, Allergies, Past Medical and Surgical History, and Social History in the Epic system.        Review of Systems   Constitutional: Negative for activity change, appetite change, chills and fever.   HENT: Positive for ear pain and sore throat. Negative for congestion, ear discharge, postnasal drip, rhinorrhea, sinus pain, sinus pressure, sneezing and trouble swallowing.         Ear pressure. Hoarse voice.    Respiratory: Negative for cough, shortness of breath, wheezing and stridor.    Gastrointestinal: Negative for diarrhea, nausea and vomiting.   Genitourinary: Negative for dysuria.   Skin: Negative for rash.   Psychiatric/Behavioral: Negative.        Physical Exam   BP: (!) 186/103  Pulse: 77  Temp: 98  F (36.7  C)  Resp: 16  SpO2: 99 %  Physical Exam   Constitutional: She is oriented to person, place, and time. She appears well-developed and well-nourished. No distress.   HENT:   Head: Normocephalic.   Right Ear: External ear normal.   Left Ear: External ear normal.   Mouth/Throat: Oropharyngeal exudate present.   Oropharynx with erythema and exudate. Tonsils +3.    Neck: Normal range of motion. Neck supple.   Cardiovascular: Normal rate, regular rhythm and normal heart sounds.    No murmur heard.  Pulmonary/Chest: Effort normal. No respiratory distress. She has no wheezes. She has no rales.   Abdominal: Soft. She exhibits no distension.   Musculoskeletal: Normal range of motion.   Lymphadenopathy:     She has cervical adenopathy.   Neurological:  She is alert and oriented to person, place, and time.   Skin: Skin is warm and dry. She is not diaphoretic. No erythema.   Psychiatric: She has a normal mood and affect. Her behavior is normal.   Nursing note and vitals reviewed.      ED Course     ED Course     Procedures      Labs Ordered and Resulted from Time of ED Arrival Up to the Time of Departure from the ED   RAPID STREP SCREEN   BETA STREP GROUP A CULTURE     Results for orders placed or performed during the hospital encounter of 10/02/17   Rapid strep screen   Result Value Ref Range    Specimen Description Throat     Rapid Strep A Screen       NEGATIVE: No Group A streptococcal antigen detected by immunoassay, await culture report.   Beta strep group A culture   Result Value Ref Range    Specimen Description Throat     Culture Micro Culture in progress          Assessments & Plan (with Medical Decision Making)     Rapid strep is negative. Clinically she is positive and will treat.     Discussed plan of care. She verbalized understanding. All questions answered.     I have reviewed the nursing notes.    I have reviewed the findings, diagnosis, plan and need for follow up with the patient.  Discharged in stable condition.     New Prescriptions    AMOXICILLIN (AMOXIL) 500 MG CAPSULE    Take 1 capsule (500 mg) by mouth 3 times daily for 10 days       Final diagnoses:   Streptococcal sore throat     Take antibiotics as ordered.   Eat a yogurt daily while taking antibiotics.   Take tylenol and or ibuprofen for pain. Follow dosing on package.   Increase water intake.   Throw tooth brush out 4 days after starting antibiotics.   Follow up with PCP with any increase in symptoms or concerns.   Return to urgent care or emergency department with any increase in symptoms or concerns.     KAHLIL Crystal  10/2/2017  7:55 PM  [facility] CL[facility]IC       Donna Allen NP  10/03/17 4340

## 2017-10-03 NOTE — DISCHARGE INSTRUCTIONS
Take antibiotics as ordered.   Eat a yogurt daily while taking antibiotics.   Take tylenol and or ibuprofen for pain. Follow dosing on package.   Increase water intake.   Throw tooth brush out 4 days after starting antibiotics.   Follow up with PCP with any increase in symptoms or concerns.   Return to urgent care or emergency department with any increase in symptoms or concerns.

## 2017-10-04 LAB
BACTERIA SPEC CULT: NORMAL
SPECIMEN SOURCE: NORMAL

## 2017-10-04 NOTE — PROGRESS NOTES
Subjective Finding:    Chief compalint: Patient presents with:  Back Pain: stiffness  Neck Pain  , Pain Scale: 6/10, Intensity: dull and ache, Duration: 2 weeks, Radiating: no.    Date of injury:     Activities that the pain restricts:   Home/household/hobbies/social activities: yes.  Work duties: no.  Sleep: no.  Makes symptoms better: rest.  Makes symptoms worse: activity and lumbar flexion.  Have you seen anyone else for the symptoms? no.  Work related: .  Automobile related injury: no.    Objective and Assessment:    Posture Analysis:   High shoulder: .  Head tilt: .  High iliac crest: .  Head carriage: neutral.  Thoracic Kyphosis: neutral.  Lumbar Lordosis: forward.    Lumbar Range of Motion: extension decreased, left lateral flexion decreased and right lateral flexion decreased.  Cervical Range of Motion: .  Thoracic Range of Motion: extension decreased.  Extremity Range of Motion: .    Palpation:   Quad lumb: bilateral, referred pain: no   Right SI tightness    Segmental dysfunction pre-treatment and treatment area: T3, L3, L4 and L5.  C6    Assessment post-treatment:  Cervical: .  Thoracic: ROM increased.  Lumbar: ROM increased and pain and tenderness decreased.    Comments: .      Complicating Factors: .    Plan / Procedure:    Treatment plan: PRN.  Instructed patient: stretch as instructed at visit and walk 10 minutes.  Short term goals: increase ROM.  Long term goals: restore normal function.  Prognosis: good.

## 2018-01-08 ENCOUNTER — OFFICE VISIT (OUTPATIENT)
Dept: CHIROPRACTIC MEDICINE | Facility: OTHER | Age: 58
End: 2018-01-08
Attending: CHIROPRACTOR
Payer: COMMERCIAL

## 2018-01-08 DIAGNOSIS — M99.01 SEGMENTAL AND SOMATIC DYSFUNCTION OF CERVICAL REGION: Primary | ICD-10-CM

## 2018-01-08 DIAGNOSIS — M99.02 SEGMENTAL AND SOMATIC DYSFUNCTION OF THORACIC REGION: ICD-10-CM

## 2018-01-08 DIAGNOSIS — M99.03 SEGMENTAL AND SOMATIC DYSFUNCTION OF LUMBAR REGION: ICD-10-CM

## 2018-01-08 DIAGNOSIS — M54.2 CERVICALGIA: ICD-10-CM

## 2018-01-08 PROCEDURE — 99211 OFF/OP EST MAY X REQ PHY/QHP: CPT | Mod: 25 | Performed by: CHIROPRACTOR

## 2018-01-08 PROCEDURE — 98941 CHIROPRACT MANJ 3-4 REGIONS: CPT | Mod: AT | Performed by: CHIROPRACTOR

## 2018-01-08 NOTE — MR AVS SNAPSHOT
"              After Visit Summary   2018    Niesha Lyle    MRN: [id number]           Patient Information     Date Of Birth          1960        Visit Information        Provider Department      2018 9:40 AM Felipe Morgan DC  [facility] [place]        Today's Diagnoses     Segmental and somatic dysfunction of cervical region    -  1    Cervicalgia        Segmental and somatic dysfunction of lumbar region        Segmental and somatic dysfunction of thoracic region           Follow-ups after your visit        Who to contact     If you have questions or need follow up information about today's clinic visit or your schedule please contact  [facility] [place] [facility] [place] directly at [phone].  Normal or non-critical lab and imaging results will be communicated to you by [employer], letter or phone within 4 business days after the clinic has received the results. If you do not hear from us within 7 days, please contact the clinic through [employer] or phone. If you have a critical or abnormal lab result, we will notify you by phone as soon as possible.  Submit refill requests through [employer] or call your pharmacy and they will forward the refill request to us. Please allow 3 business days for your refill to be completed.          Additional Information About Your Visit        MyChart Information     [employer] lets you send messages to your doctor, view your test results, renew your prescriptions, schedule appointments and more. To sign up, go to [email or web address] . Click on \"Log in\" on the left side of the screen, which will take you to the Welcome page. Then click on \"Sign up Now\" on the right side of the page.     You will be asked to enter the access code listed below, as well as some personal information. Please follow the directions to create your username and password.     Your access code is: [email or web address]  Expires: 2018  4:37 PM     Your access code will  in 90 days. If you need help or " a new code, please call your [facility] clinic or [phone].        Care EveryWhere ID     This is your Care EveryWhere ID. This could be used by other organizations to access your [facility] medical records  [id number]         Blood Pressure from Last 3 Encounters:   10/02/17 (!) 186/103   08/27/16 (!) 170/102   08/24/16 (!) 166/101    Weight from Last 3 Encounters:   03/16/15 162 lb (73.5 kg)              We Performed the Following     CHIROPRAC MANIP,SPINAL,3-4 REGIONS        Primary Care Provider Office Phone # Fax #    Venus Venus, DIANA [phone]       [facility] [address] [facility] [address]        Equal Access to Services     ARNULFO DEVINE : Hadii andres leo hadasho Soomaali, waaxda luqadaha, qaybta kaalmada adeegyada, janet lucero . So [facility] [phone].    ATENCIÓN: Si habla español, tiene a walker disposición servicios gratuitos de asistencia lingüística. Llame al [phone].    We comply with applicable federal civil rights laws and [place] laws. We do not discriminate on the basis of race, color, national origin, age, disability, sex, sexual orientation, or gender identity.            Thank you!     Thank you for choosing  [facility]  for your care. Our goal is always to provide you with excellent care. Hearing back from our patients is one way we can continue to improve our services. Please take a few minutes to complete the written survey that you may receive in the mail after your visit with us. Thank you!             Your Updated Medication List - Protect others around you: Learn how to safely use, store and throw away your medicines at www.disposemymeds.org.          This list is accurate as of: 1/8/18  4:37 PM.  Always use your most recent med list.                   Brand Name Dispense Instructions for use Diagnosis    ATENOLOL PO      Take 25 mg by mouth daily        CALCIUM + D PO      Take 1 tablet by mouth daily         CYMBALTA PO      Take by mouth daily        fluticasone 50 MCG/ACT spray    FLONASE    1 Package    Spray 2 sprays into both nostrils daily        KRILL OIL PO      Take by mouth daily        LISINOPRIL PO      Take 20 mg by mouth daily        SIMVASTATIN PO      Take by mouth At Bedtime        TRAZODONE HCL PO      Take by mouth At Bedtime        TYLENOL PO      Take 1,300 mg by mouth

## 2018-01-08 NOTE — PROGRESS NOTES
Subjective Finding:    Chief compalint: Patient presents with:  Back Pain  Neck Pain  , Pain Scale: 6/10, Intensity: dull and ache, Duration: 2 days, Radiating: no.    Date of injury:     Activities that the pain restricts:   Home/household/hobbies/social activities: yes.  Work duties: no.  Sleep: no.  Makes symptoms better: rest.  Makes symptoms worse: activity and lumbar flexion.  Have you seen anyone else for the symptoms? no.  Work related: .  Automobile related injury: no.    Objective and Assessment:    Posture Analysis:   High shoulder: .  Head tilt: .  High iliac crest: .  Head carriage: neutral.  Thoracic Kyphosis: neutral.  Lumbar Lordosis: forward.    Lumbar Range of Motion: extension decreased, left lateral flexion decreased and right lateral flexion decreased.  Cervical Range of Motion: .  Thoracic Range of Motion: extension decreased.  Extremity Range of Motion: .    Palpation:   Quad lumb: bilateral, referred pain: no   Right SI tightness    Segmental dysfunction pre-treatment and treatment area: T3, L3, L4 and L5.  C6    Assessment post-treatment:  Cervical: .  Thoracic: ROM increased.  Lumbar: ROM increased and pain and tenderness decreased.    Comments: .      Complicating Factors: .    Plan / Procedure:    Treatment plan: PRN.  Instructed patient: stretch as instructed at visit and walk 10 minutes.  Short term goals: increase ROM.  Long term goals: restore normal function.  Prognosis: good.

## 2018-01-16 ENCOUNTER — OFFICE VISIT (OUTPATIENT)
Dept: CHIROPRACTIC MEDICINE | Facility: OTHER | Age: 58
End: 2018-01-16
Attending: CHIROPRACTOR
Payer: COMMERCIAL

## 2018-01-16 DIAGNOSIS — M99.02 SEGMENTAL AND SOMATIC DYSFUNCTION OF THORACIC REGION: ICD-10-CM

## 2018-01-16 DIAGNOSIS — M99.01 SEGMENTAL AND SOMATIC DYSFUNCTION OF CERVICAL REGION: ICD-10-CM

## 2018-01-16 DIAGNOSIS — M99.03 SEGMENTAL AND SOMATIC DYSFUNCTION OF LUMBAR REGION: Primary | ICD-10-CM

## 2018-01-16 DIAGNOSIS — M54.50 ACUTE BILATERAL LOW BACK PAIN WITHOUT SCIATICA: ICD-10-CM

## 2018-01-16 PROCEDURE — 98941 CHIROPRACT MANJ 3-4 REGIONS: CPT | Mod: AT | Performed by: CHIROPRACTOR

## 2018-01-16 NOTE — MR AVS SNAPSHOT
"              After Visit Summary   1/16/2018    Niesha Lyle    MRN: [id number]           Patient Information     Date Of Birth          1960        Visit Information        Provider Department      1/16/2018 10:50 AM Felipe Morgan DC  [facility]        Today's Diagnoses     Segmental and somatic dysfunction of lumbar region    -  1    Acute bilateral low back pain without sciatica        Segmental and somatic dysfunction of thoracic region        Segmental and somatic dysfunction of cervical region           Follow-ups after your visit        Who to contact     If you have questions or need follow up information about today's clinic visit or your schedule please contact  [facility] directly at [phone].  Normal or non-critical lab and imaging results will be communicated to you by [employer], letter or phone within 4 business days after the clinic has received the results. If you do not hear from us within 7 days, please contact the clinic through [employer] or phone. If you have a critical or abnormal lab result, we will notify you by phone as soon as possible.  Submit refill requests through [employer] or call your pharmacy and they will forward the refill request to us. Please allow 3 business days for your refill to be completed.          Additional Information About Your Visit        MyChart Information     [employer] lets you send messages to your doctor, view your test results, renew your prescriptions, schedule appointments and more. To sign up, go to [email or web address] . Click on \"Log in\" on the left side of the screen, which will take you to the Welcome page. Then click on \"Sign up Now\" on the right side of the page.     You will be asked to enter the access code listed below, as well as some personal information. Please follow the directions to create your username and password.     Your access code is: [email or web address]  Expires: 4/8/2018  4:37 PM     Your access code will "  in 90 days. If you need help or a new code, please call your [facility] clinic or [phone].        Care EveryWhere ID     This is your Care EveryWhere ID. This could be used by other organizations to access your [facility] medical records  [id number]         Blood Pressure from Last 3 Encounters:   10/02/17 (!) 186/103   16 (!) 170/102   16 (!) 166/101    Weight from Last 3 Encounters:   03/16/15 162 lb (73.5 kg)              We Performed the Following     CHIROPRAC MANIP,SPINAL,3-4 REGIONS        Primary Care Provider Office Phone # Fax #    Venus DIANA Donovan [phone]       [facility] [address]  [facility] [address]        Equal Access to Services     ARNULFO DEVINE : Hadii aad ku hadasho Soomaali, waaxda luqadaha, qaybta kaalmada adechrisyasawyer, janet lucero . So [facility] [phone].    ATENCIÓN: Si habla español, tiene a walker disposición servicios gratuitos de asistencia lingüística. Luca al [phone].    We comply with applicable federal civil rights laws and [place] laws. We do not discriminate on the basis of race, color, national origin, age, disability, sex, sexual orientation, or gender identity.            Thank you!     Thank you for choosing  [facility]  for your care. Our goal is always to provide you with excellent care. Hearing back from our patients is one way we can continue to improve our services. Please take a few minutes to complete the written survey that you may receive in the mail after your visit with us. Thank you!             Your Updated Medication List - Protect others around you: Learn how to safely use, store and throw away your medicines at www.disposemymeds.org.          This list is accurate as of: 18 11:59 PM.  Always use your most recent med list.                   Brand Name Dispense Instructions for use Diagnosis    ATENOLOL PO      Take 25 mg by mouth daily        CALCIUM + D PO       Take 1 tablet by mouth daily        CYMBALTA PO      Take by mouth daily        fluticasone 50 MCG/ACT spray    FLONASE    1 Package    Spray 2 sprays into both nostrils daily        KRILL OIL PO      Take by mouth daily        LISINOPRIL PO      Take 20 mg by mouth daily        SIMVASTATIN PO      Take by mouth At Bedtime        TRAZODONE HCL PO      Take by mouth At Bedtime        TYLENOL PO      Take 1,300 mg by mouth

## 2018-01-18 ENCOUNTER — OFFICE VISIT (OUTPATIENT)
Dept: CHIROPRACTIC MEDICINE | Facility: OTHER | Age: 58
End: 2018-01-18
Attending: CHIROPRACTOR
Payer: COMMERCIAL

## 2018-01-18 DIAGNOSIS — M99.02 SEGMENTAL AND SOMATIC DYSFUNCTION OF THORACIC REGION: ICD-10-CM

## 2018-01-18 DIAGNOSIS — M99.01 SEGMENTAL AND SOMATIC DYSFUNCTION OF CERVICAL REGION: ICD-10-CM

## 2018-01-18 DIAGNOSIS — M54.50 ACUTE RIGHT-SIDED LOW BACK PAIN WITHOUT SCIATICA: ICD-10-CM

## 2018-01-18 DIAGNOSIS — M99.03 SEGMENTAL AND SOMATIC DYSFUNCTION OF LUMBAR REGION: Primary | ICD-10-CM

## 2018-01-18 PROCEDURE — 98941 CHIROPRACT MANJ 3-4 REGIONS: CPT | Mod: AT | Performed by: CHIROPRACTOR

## 2018-01-18 NOTE — MR AVS SNAPSHOT
"              After Visit Summary   1/18/2018    Niesha Lyle    MRN: [id number]           Patient Information     Date Of Birth          1960        Visit Information        Provider Department      1/18/2018 3:10 PM Felipe Morgan DC  [facility]        Today's Diagnoses     Segmental and somatic dysfunction of lumbar region    -  1    Acute right-sided low back pain without sciatica        Segmental and somatic dysfunction of thoracic region        Segmental and somatic dysfunction of cervical region           Follow-ups after your visit        Who to contact     If you have questions or need follow up information about today's clinic visit or your schedule please contact  [facility] directly at [phone].  Normal or non-critical lab and imaging results will be communicated to you by Hotreaderhart, letter or phone within 4 business days after the clinic has received the results. If you do not hear from us within 7 days, please contact the clinic through MyChart or phone. If you have a critical or abnormal lab result, we will notify you by phone as soon as possible.  Submit refill requests through PartTec or call your pharmacy and they will forward the refill request to us. Please allow 3 business days for your refill to be completed.          Additional Information About Your Visit        MyChart Information     PartTec lets you send messages to your doctor, view your test results, renew your prescriptions, schedule appointments and more. To sign up, go to [email or web address] . Click on \"Log in\" on the left side of the screen, which will take you to the Welcome page. Then click on \"Sign up Now\" on the right side of the page.     You will be asked to enter the access code listed below, as well as some personal information. Please follow the directions to create your username and password.     Your access code is: [email or web address]  Expires: 4/8/2018  4:37 PM     Your access code will "  in 90 days. If you need help or a new code, please call your [place] clinic or [phone].        Care EveryWhere ID     This is your Care EveryWhere ID. This could be used by other organizations to access your [place] medical records  [id number]         Blood Pressure from Last 3 Encounters:   10/02/17 (!) 186/103   16 (!) 170/102   16 (!) 166/101    Weight from Last 3 Encounters:   03/16/15 162 lb (73.5 kg)              We Performed the Following     CHIROPRAC MANIP,SPINAL,3-4 REGIONS        Primary Care Provider Office Phone # Fax #    Venus DIANA Donovan [phone]       [facility] [address]  [facility] [address]        Equal Access to Services     ARNULFO DEVINE : Hadii aad ku hadasho Soomaali, waaxda luqadaha, qaybta kaalmada adechrisyasawyer, janet lucero . So [facility] [phone].    ATENCIÓN: Si habla español, tiene a walker disposición servicios gratuitos de asistencia lingüística. Luca al [phone].    We comply with applicable federal civil rights laws and [place] laws. We do not discriminate on the basis of race, color, national origin, age, disability, sex, sexual orientation, or gender identity.            Thank you!     Thank you for choosing  [facility]  for your care. Our goal is always to provide you with excellent care. Hearing back from our patients is one way we can continue to improve our services. Please take a few minutes to complete the written survey that you may receive in the mail after your visit with us. Thank you!             Your Updated Medication List - Protect others around you: Learn how to safely use, store and throw away your medicines at www.disposemymeds.org.          This list is accurate as of: 18  3:48 PM.  Always use your most recent med list.                   Brand Name Dispense Instructions for use Diagnosis    ATENOLOL PO      Take 25 mg by mouth daily        CALCIUM + D PO       Take 1 tablet by mouth daily        CYMBALTA PO      Take by mouth daily        fluticasone 50 MCG/ACT spray    FLONASE    1 Package    Spray 2 sprays into both nostrils daily        KRILL OIL PO      Take by mouth daily        LISINOPRIL PO      Take 20 mg by mouth daily        SIMVASTATIN PO      Take by mouth At Bedtime        TRAZODONE HCL PO      Take by mouth At Bedtime        TYLENOL PO      Take 1,300 mg by mouth

## 2018-01-29 ENCOUNTER — OFFICE VISIT (OUTPATIENT)
Dept: CHIROPRACTIC MEDICINE | Facility: OTHER | Age: 58
End: 2018-01-29
Attending: CHIROPRACTOR
Payer: COMMERCIAL

## 2018-01-29 DIAGNOSIS — M99.01 SEGMENTAL AND SOMATIC DYSFUNCTION OF CERVICAL REGION: ICD-10-CM

## 2018-01-29 DIAGNOSIS — M54.50 ACUTE LEFT-SIDED LOW BACK PAIN WITHOUT SCIATICA: ICD-10-CM

## 2018-01-29 DIAGNOSIS — M99.03 SEGMENTAL AND SOMATIC DYSFUNCTION OF LUMBAR REGION: ICD-10-CM

## 2018-01-29 DIAGNOSIS — M99.02 SEGMENTAL AND SOMATIC DYSFUNCTION OF THORACIC REGION: Primary | ICD-10-CM

## 2018-01-29 PROCEDURE — 98941 CHIROPRACT MANJ 3-4 REGIONS: CPT | Mod: AT | Performed by: CHIROPRACTOR

## 2018-01-29 NOTE — MR AVS SNAPSHOT
"              After Visit Summary   1/29/2018    Niesha Lyle    MRN: [id number]           Patient Information     Date Of Birth          1960        Visit Information        Provider Department      1/29/2018 9:30 AM Felipe Morgan DC  [facility]        Today's Diagnoses     Segmental and somatic dysfunction of thoracic region    -  1    Acute left-sided low back pain without sciatica        Segmental and somatic dysfunction of lumbar region        Segmental and somatic dysfunction of cervical region           Follow-ups after your visit        Who to contact     If you have questions or need follow up information about today's clinic visit or your schedule please contact  [facility] directly at [phone].  Normal or non-critical lab and imaging results will be communicated to you by SocialScihart, letter or phone within 4 business days after the clinic has received the results. If you do not hear from us within 7 days, please contact the clinic through MyChart or phone. If you have a critical or abnormal lab result, we will notify you by phone as soon as possible.  Submit refill requests through Yagantec or call your pharmacy and they will forward the refill request to us. Please allow 3 business days for your refill to be completed.          Additional Information About Your Visit        MyChart Information     Yagantec lets you send messages to your doctor, view your test results, renew your prescriptions, schedule appointments and more. To sign up, go to [email or web address] . Click on \"Log in\" on the left side of the screen, which will take you to the Welcome page. Then click on \"Sign up Now\" on the right side of the page.     You will be asked to enter the access code listed below, as well as some personal information. Please follow the directions to create your username and password.     Your access code is: [email or web address]  Expires: 4/8/2018  4:37 PM     Your access code will "  in 90 days. If you need help or a new code, please call your [place] clinic or [phone].        Care EveryWhere ID     This is your Care EveryWhere ID. This could be used by other organizations to access your [place] medical records  [id number]         Blood Pressure from Last 3 Encounters:   10/02/17 (!) 186/103   16 (!) 170/102   16 (!) 166/101    Weight from Last 3 Encounters:   03/16/15 162 lb (73.5 kg)              We Performed the Following     CHIROPRAC MANIP,SPINAL,3-4 REGIONS        Primary Care Provider Office Phone # Fax #    Venus DIANA Donovan [phone]       [facility] [address]  [facility] [address]        Equal Access to Services     ARNULFO DEVINE : Hadii aad ku hadasho Soomaali, waaxda luqadaha, qaybta kaalmada adechrisyasawyer, janet lucero . So [facility] [phone].    ATENCIÓN: Si habla español, tiene a walker disposición servicios gratuitos de asistencia lingüística. Luca al [phone].    We comply with applicable federal civil rights laws and [place] laws. We do not discriminate on the basis of race, color, national origin, age, disability, sex, sexual orientation, or gender identity.            Thank you!     Thank you for choosing  [facility]  for your care. Our goal is always to provide you with excellent care. Hearing back from our patients is one way we can continue to improve our services. Please take a few minutes to complete the written survey that you may receive in the mail after your visit with us. Thank you!             Your Updated Medication List - Protect others around you: Learn how to safely use, store and throw away your medicines at www.disposemymeds.org.          This list is accurate as of 18  9:43 AM.  Always use your most recent med list.                   Brand Name Dispense Instructions for use Diagnosis    ATENOLOL PO      Take 25 mg by mouth daily        CALCIUM + D PO       Take 1 tablet by mouth daily        CYMBALTA PO      Take by mouth daily        fluticasone 50 MCG/ACT spray    FLONASE    1 Package    Spray 2 sprays into both nostrils daily        KRILL OIL PO      Take by mouth daily        LISINOPRIL PO      Take 20 mg by mouth daily        SIMVASTATIN PO      Take by mouth At Bedtime        TRAZODONE HCL PO      Take by mouth At Bedtime        TYLENOL PO      Take 1,300 mg by mouth

## 2018-01-29 NOTE — PROGRESS NOTES
Subjective Finding:    Chief compalint: Patient presents with:  Back Pain: left rib pain  , Pain Scale: 6/10, Intensity: dull and ache, Duration: 2 days, Radiating: no.    Date of injury:     Activities that the pain restricts:   Home/household/hobbies/social activities: yes.  Work duties: no.  Sleep: no.  Makes symptoms better: rest.  Makes symptoms worse: activity and lumbar flexion.  Have you seen anyone else for the symptoms? no.  Work related: .  Automobile related injury: no.    Objective and Assessment:    Posture Analysis:   High shoulder: .  Head tilt: .  High iliac crest: .  Head carriage: neutral.  Thoracic Kyphosis: neutral.  Lumbar Lordosis: forward.    Lumbar Range of Motion: extension decreased, left lateral flexion decreased and right lateral flexion decreased.  Cervical Range of Motion: .  Thoracic Range of Motion: extension decreased.  Extremity Range of Motion: .    Palpation:   Left TL jxn Pain   Right SI tightness    Segmental dysfunction pre-treatment and treatment area: T10  Left  L23 .  C6    Assessment post-treatment:  Cervical: .  Thoracic: ROM increased.  Lumbar: ROM increased and pain and tenderness decreased.    Comments: .      Complicating Factors: .    Plan / Procedure:    Treatment plan: PRN.  Instructed patient: stretch as instructed at visit and walk 10 minutes.  Short term goals: increase ROM.  Long term goals: restore normal function.  Prognosis: good.

## 2018-01-31 ENCOUNTER — HOSPITAL ENCOUNTER (EMERGENCY)
Facility: HOSPITAL | Age: 58
Discharge: HOME | End: 2018-01-31
Attending: PHYSICIAN ASSISTANT | Admitting: PHYSICIAN ASSISTANT
Payer: COMMERCIAL

## 2018-01-31 VITALS
TEMPERATURE: 98.4 F | RESPIRATION RATE: 16 BRPM | SYSTOLIC BLOOD PRESSURE: 164 MMHG | OXYGEN SATURATION: 97 % | DIASTOLIC BLOOD PRESSURE: 84 MMHG | HEIGHT: 66 IN

## 2018-01-31 DIAGNOSIS — M62.830 BACK MUSCLE SPASM: ICD-10-CM

## 2018-01-31 PROCEDURE — G0463 HOSPITAL OUTPT CLINIC VISIT: HCPCS

## 2018-01-31 PROCEDURE — 99213 OFFICE O/P EST LOW 20 MIN: CPT | Performed by: PHYSICIAN ASSISTANT

## 2018-01-31 RX ORDER — KETOROLAC TROMETHAMINE 10 MG/1
10 TABLET, FILM COATED ORAL EVERY 6 HOURS PRN
Qty: 10 TABLET | Refills: 0 | Status: SHIPPED | OUTPATIENT
Start: 2018-01-31 | End: 2018-08-23

## 2018-01-31 RX ORDER — PREDNISONE 20 MG/1
TABLET ORAL
Qty: 10 TABLET | Refills: 0 | Status: SHIPPED | OUTPATIENT
Start: 2018-01-31 | End: 2018-03-04

## 2018-01-31 ASSESSMENT — ENCOUNTER SYMPTOMS
CARDIOVASCULAR NEGATIVE: 1
NEUROLOGICAL NEGATIVE: 1
CONSTITUTIONAL NEGATIVE: 1
MYALGIAS: 1
PSYCHIATRIC NEGATIVE: 1
BACK PAIN: 1

## 2018-01-31 NOTE — ED AVS SNAPSHOT
[facility]    [address]    [place] [address]    Phone:  [phone]                                       Niesha Lyle   MRN: [id number]    Department:  [facility]   Date of Visit:  1/31/2018           After Visit Summary Signature Page     I have received my discharge instructions, and my questions have been answered. I have discussed any challenges I see with this plan with the nurse or doctor.    ..........................................................................................................................................  Patient/Patient Representative Signature      ..........................................................................................................................................  Patient Representative Print Name and Relationship to Patient    ..................................................               ................................................  Date                                            Time    ..........................................................................................................................................  Reviewed by Signature/Title    ...................................................              ..............................................  Date                                                            Time

## 2018-01-31 NOTE — ED AVS SNAPSHOT
[facility]    [address] [address]    Phone:  [phone]                                       Niesha Lyle   MRN: [id number]    Department:  [facility]   Date of Visit:  1/31/2018           Patient Information     Date Of Birth          1960        Your diagnoses for this visit were:     Back muscle spasm        You were seen by Anna Huitron PA.      Follow-up Information     Follow up with Venus Donovan NP.    Specialty:  Nurse Practitioner    Why:  If symptoms worsen    Contact information:    [facility]  [address] [facility] [address] [phone]          Follow up with [facility].    Specialty:  EMERGENCY MEDICINE    Why:  If further concerns develop    Contact information:    [address] [address] [phone]    Additional information:    From [facility]: Take [address]. Turn left at [address]. Turn left at the first stoplight on [address]. At the first stop sign, take a right onto [address]. Take a left into the parking lot and continue through until you reach the North enterance of the building.       From [place]: Take [address]. Take the [address] ramp towards [place]. Turn left onto [address]. Take a slight right onto [address]. Turn left at the first stoplight on [address]. At the first stop sign, take a right onto [address]. Take a left into the parking lot and continue through until you reach the North enterance of the building.       From [place]: Take [address]. Take a right at [address]. At the first stop sign, take a right onto [address]. Take a left into the parking lot and continue through until you reach the North enterance of the building.       Discharge References/Attachments     BACK SAFETY: POOR POSTURE HURTS (ENGLISH)    BACK PAIN, RELIEVING (ENGLISH)     BACK SPASM, NO TRAUMA (ENGLISH)    BACK, RELIEVING TENSION IN YOUR (ENGLISH)         Review of your medicines      START taking        Dose / Directions Last dose taken    ketorolac 10 MG tablet   Commonly known as:  TORADOL   Dose:  10 mg   Quantity:  10 tablet        Take 1 tablet (10 mg) by mouth every 6 hours as needed for moderate pain   Refills:  0        predniSONE 20 MG tablet   Commonly known as:  DELTASONE   Quantity:  10 tablet        Take two tablets (= 40mg) each day for 5 (five) days   Refills:  0          Our records show that you are taking the medicines listed below. If these are incorrect, please call your family doctor or clinic.        Dose / Directions Last dose taken    ATENOLOL PO   Dose:  25 mg        Take 25 mg by mouth daily   Refills:  0        CALCIUM + D PO   Dose:  1 tablet        Take 1 tablet by mouth daily   Refills:  0        CYMBALTA PO        Take by mouth daily   Refills:  0        FLEXERIL PO   Dose:  10 mg        Take 10 mg by mouth   Refills:  0        fluticasone 50 MCG/ACT spray   Commonly known as:  FLONASE   Dose:  2 spray   Quantity:  1 Package        Spray 2 sprays into both nostrils daily   Refills:  1        IBUPROFEN PO   Dose:  600 mg        Take 600 mg by mouth   Refills:  0        LISINOPRIL PO   Dose:  20 mg        Take 20 mg by mouth daily   Refills:  0        SIMVASTATIN PO        Take by mouth At Bedtime   Refills:  0        TRAZODONE HCL PO        Take by mouth At Bedtime   Refills:  0        TYLENOL PO   Dose:  1300 mg        Take 1,300 mg by mouth   Refills:  0                Prescriptions were sent or printed at these locations (2 Prescriptions)                   [id number] - [place] - [address] [facility] [address] [address] [place] [address]    Telephone:  [phone]   Fax:  [phone]   Hours:                  E-Prescribed (2 of 2)         predniSONE (DELTASONE) 20 MG tablet               ketorolac (TORADOL) 10 MG tablet                Orders  "Needing Specimen Collection     None      Pending Results     No orders found from 2018 to 2018.            Pending Culture Results     No orders found from 2018 to 2018.            Thank you for choosing [facility]       Thank you for choosing [facility] for your care. Our goal is always to provide you with excellent care. Hearing back from our patients is one way we can continue to improve our services. Please take a few minutes to complete the written survey that you may receive in the mail after you visit with us. Thank you!        [employer] Information     [email or web address] lets you send messages to your doctor, view your test results, renew your prescriptions, schedule appointments and more. To sign up, go to [email or web address] . Click on \"Log in\" on the left side of the screen, which will take you to the Welcome page. Then click on \"Sign up Now\" on the right side of the page.     You will be asked to enter the access code listed below, as well as some personal information. Please follow the directions to create your username and password.     Your access code is: [email or web address]  Expires: 2018  4:37 PM     Your access code will  in 90 days. If you need help or a new code, please call your [place] clinic or [phone].        Care EveryWhere ID     This is your Care EveryWhere ID. This could be used by other organizations to access your [place] medical records  [id number]        Equal Access to Services     ARNULFO DEVINE : Hadii andres parsonso Solevonali, waaxda luqadaha, qaybta kaalmada adeegyada, janet lucero . So [facility] [phone].    ATENCIÓN: Si habla español, tiene a walker disposición servicios gratuitos de asistencia lingüística. Llame al [phone].    We comply with applicable federal civil rights laws and [place] laws. We do not discriminate on the basis of race, color, national origin, age, disability, sex, sexual orientation, or gender identity.       "      After Visit Summary       This is your record. Keep this with you and show to your community pharmacist(s) and doctor(s) at your next visit.

## 2018-02-01 NOTE — ED NOTES
Pt presents today alone for exacerbation of fibromyalgia, says she took flexeril and it still won't loosen up.

## 2018-02-01 NOTE — ED PROVIDER NOTES
"  History     Chief Complaint   Patient presents with     Back Pain     For 1.5 weeks \"From my ribs to my tailbone\". States has radiation down right leg. Tried chiropractor with no relief.     The history is provided by the patient. No  was used.     Niesha Lyle is a 57 year old female who     Problem List:    There are no active problems to display for this patient.       Past Medical History:    History reviewed. No pertinent past medical history.    Past Surgical History:    History reviewed. No pertinent surgical history.    Family History:    No family history on file.    Social History:  Marital Status:   [2]  Social History   Substance Use Topics     Smoking status: Never Smoker     Smokeless tobacco: Never Used     Alcohol use No        Medications:      IBUPROFEN PO   Cyclobenzaprine HCl (FLEXERIL PO)   predniSONE (DELTASONE) 20 MG tablet   ketorolac (TORADOL) 10 MG tablet   Calcium Carbonate-Vitamin D (CALCIUM + D PO)   SIMVASTATIN PO   LISINOPRIL PO   DULoxetine HCl (CYMBALTA PO)   ATENOLOL PO   TRAZODONE HCL PO   Acetaminophen (TYLENOL PO)   fluticasone (FLONASE) 50 MCG/ACT nasal spray         Review of Systems   Constitutional: Negative.    HENT: Negative.    Cardiovascular: Negative.    Musculoskeletal: Positive for back pain and myalgias.   Neurological: Negative.    Psychiatric/Behavioral: Negative.        Physical Exam   BP: 164/84  Heart Rate: 82  Temp: 98.4  F (36.9  C)  Resp: 16  Height: 167.6 cm (5' 6\")  SpO2: 97 %      Physical Exam   Constitutional: She is oriented to person, place, and time. She appears well-developed and well-nourished. No distress.   Cardiovascular: Normal rate.    Pulmonary/Chest: Effort normal.   Musculoskeletal:   Back: no e/e/e/e. Mild TTP along the lower t-spine and upper Lumbar spine parapsinous muscles. +AFROM, 4/5 strength, due to pain    BLE: +AFROM, m/n/v intact, 5/5 strength    Gait: NL   Neurological: She is alert and oriented " to person, place, and time.   Skin: She is not diaphoretic.   Psychiatric: She has a normal mood and affect.   Nursing note and vitals reviewed.      ED Course     ED Course     Procedures            Assessments & Plan (with Medical Decision Making)     I have reviewed the nursing notes.    I have reviewed the findings, diagnosis, plan and need for follow up with the patient.      New Prescriptions    KETOROLAC (TORADOL) 10 MG TABLET    Take 1 tablet (10 mg) by mouth every 6 hours as needed for moderate pain    PREDNISONE (DELTASONE) 20 MG TABLET    Take two tablets (= 40mg) each day for 5 (five) days       Final diagnoses:   Back muscle spasm             Patient verbally educated and given appropriate education sheets for the diagnoses and has no questions.  Take medications as directed.   Follow up with your Primary Care provider if symptoms increase or if concerns develop, return to the ER  Anna Huitron Certified  Physician Assistant  1/31/2018  6:37 PM  URGENT CARE CLINIC      1/31/2018   [facility]     Anna Huitron PA  01/31/18 1900

## 2018-02-05 ENCOUNTER — OFFICE VISIT (OUTPATIENT)
Dept: CHIROPRACTIC MEDICINE | Facility: OTHER | Age: 58
End: 2018-02-05
Attending: CHIROPRACTOR
Payer: COMMERCIAL

## 2018-02-05 DIAGNOSIS — M99.01 SEGMENTAL AND SOMATIC DYSFUNCTION OF CERVICAL REGION: ICD-10-CM

## 2018-02-05 DIAGNOSIS — M99.03 SEGMENTAL AND SOMATIC DYSFUNCTION OF LUMBAR REGION: ICD-10-CM

## 2018-02-05 DIAGNOSIS — M54.50 ACUTE LEFT-SIDED LOW BACK PAIN WITHOUT SCIATICA: ICD-10-CM

## 2018-02-05 DIAGNOSIS — M99.02 SEGMENTAL AND SOMATIC DYSFUNCTION OF THORACIC REGION: Primary | ICD-10-CM

## 2018-02-05 PROCEDURE — 98941 CHIROPRACT MANJ 3-4 REGIONS: CPT | Mod: AT | Performed by: CHIROPRACTOR

## 2018-02-05 NOTE — MR AVS SNAPSHOT
"              After Visit Summary   2/5/2018    Niesha Lyle    MRN: [id number]           Patient Information     Date Of Birth          1960        Visit Information        Provider Department      2/5/2018 8:40 AM Felipe Morgan DC  [facility]        Today's Diagnoses     Segmental and somatic dysfunction of thoracic region    -  1    Acute left-sided low back pain without sciatica        Segmental and somatic dysfunction of lumbar region        Segmental and somatic dysfunction of cervical region           Follow-ups after your visit        Who to contact     If you have questions or need follow up information about today's clinic visit or your schedule please contact  [facility] directly at [phone].  Normal or non-critical lab and imaging results will be communicated to you by [employer], letter or phone within 4 business days after the clinic has received the results. If you do not hear from us within 7 days, please contact the clinic through MyChart or phone. If you have a critical or abnormal lab result, we will notify you by phone as soon as possible.  Submit refill requests through [employer] or call your pharmacy and they will forward the refill request to us. Please allow 3 business days for your refill to be completed.          Additional Information About Your Visit        MyChart Information     [employer] lets you send messages to your doctor, view your test results, renew your prescriptions, schedule appointments and more. To sign up, go to [email or web address] . Click on \"Log in\" on the left side of the screen, which will take you to the Welcome page. Then click on \"Sign up Now\" on the right side of the page.     You will be asked to enter the access code listed below, as well as some personal information. Please follow the directions to create your username and password.     Your access code is: [email or web address]  Expires: 4/8/2018  4:37 PM     Your access code will "  in 90 days. If you need help or a new code, please call your [facility] clinic or [phone].        Care EveryWhere ID     This is your Care EveryWhere ID. This could be used by other organizations to access your [facility] medical records  [id number]         Blood Pressure from Last 3 Encounters:   18 164/84   10/02/17 (!) 186/103   16 (!) 170/102    Weight from Last 3 Encounters:   03/16/15 162 lb (73.5 kg)              We Performed the Following     CHIROPRAC MANIP,SPINAL,3-4 REGIONS        Primary Care Provider Office Phone # Fax #    Venus Venus, DIANA [phone]       [facility] [address]  [facility] [address]        Equal Access to Services     [facility] : Hadii aad ku hadasho Soomaali, waaxda luqadaha, qaybta kaalmada adeegyada, janet lucero . So [facility] [phone].    ATENCIÓN: Si habla español, tiene a walker disposición servicios gratuitos de asistencia lingüística. Luca al [phone].    We comply with applicable federal civil rights laws and [place] laws. We do not discriminate on the basis of race, color, national origin, age, disability, sex, sexual orientation, or gender identity.            Thank you!     Thank you for choosing  [facility]  for your care. Our goal is always to provide you with excellent care. Hearing back from our patients is one way we can continue to improve our services. Please take a few minutes to complete the written survey that you may receive in the mail after your visit with us. Thank you!             Your Updated Medication List - Protect others around you: Learn how to safely use, store and throw away your medicines at www.disposemymeds.org.          This list is accurate as of 18  9:58 AM.  Always use your most recent med list.                   Brand Name Dispense Instructions for use Diagnosis    ATENOLOL PO      Take 25 mg by mouth daily        CALCIUM + D PO       Take 1 tablet by mouth daily        CYMBALTA PO      Take by mouth daily        FLEXERIL PO      Take 10 mg by mouth        fluticasone 50 MCG/ACT spray    FLONASE    1 Package    Spray 2 sprays into both nostrils daily        IBUPROFEN PO      Take 600 mg by mouth        ketorolac 10 MG tablet    TORADOL    10 tablet    Take 1 tablet (10 mg) by mouth every 6 hours as needed for moderate pain        LISINOPRIL PO      Take 20 mg by mouth daily        predniSONE 20 MG tablet    DELTASONE    10 tablet    Take two tablets (= 40mg) each day for 5 (five) days        SIMVASTATIN PO      Take by mouth At Bedtime        TRAZODONE HCL PO      Take by mouth At Bedtime        TYLENOL PO      Take 1,300 mg by mouth

## 2018-02-08 ENCOUNTER — OFFICE VISIT (OUTPATIENT)
Dept: CHIROPRACTIC MEDICINE | Facility: OTHER | Age: 58
End: 2018-02-08
Attending: CHIROPRACTOR
Payer: COMMERCIAL

## 2018-02-08 DIAGNOSIS — M99.03 SEGMENTAL AND SOMATIC DYSFUNCTION OF LUMBAR REGION: Primary | ICD-10-CM

## 2018-02-08 DIAGNOSIS — M99.02 SEGMENTAL AND SOMATIC DYSFUNCTION OF THORACIC REGION: ICD-10-CM

## 2018-02-08 DIAGNOSIS — M54.50 ACUTE LEFT-SIDED LOW BACK PAIN WITHOUT SCIATICA: ICD-10-CM

## 2018-02-08 PROCEDURE — 98940 CHIROPRACT MANJ 1-2 REGIONS: CPT | Mod: AT | Performed by: CHIROPRACTOR

## 2018-02-08 NOTE — PROGRESS NOTES
Subjective Finding:    Chief compalint: Patient presents with:  Back Pain: left  rib pain  , Pain Scale: 3/10, Intensity: dull and ache, Duration: 2 days, Radiating: no.    Date of injury:     Activities that the pain restricts:   Home/household/hobbies/social activities: yes.  Work duties: no.  Sleep: no.  Makes symptoms better: rest.  Makes symptoms worse: activity and lumbar flexion.  Have you seen anyone else for the symptoms? no.  Work related: .  Automobile related injury: no.    Objective and Assessment:    Posture Analysis:   High shoulder: .  Head tilt: .  High iliac crest: .  Head carriage: neutral.  Thoracic Kyphosis: neutral.  Lumbar Lordosis: forward.    Lumbar Range of Motion: extension decreased, left lateral flexion decreased and right lateral flexion decreased.  Cervical Range of Motion: .  Thoracic Range of Motion: extension decreased.  Extremity Range of Motion: .    Palpation:   Left TL jxn Pain   Right SI tightness    Segmental dysfunction pre-treatment and treatment area: T10  Left  L23 .      Assessment post-treatment:  Cervical: .  Thoracic: ROM increased.  Lumbar: ROM increased and pain and tenderness decreased.    Comments: .      Complicating Factors: .    Plan / Procedure:    Treatment plan: PRN.  Instructed patient: stretch as instructed at visit and walk 10 minutes.  Short term goals: increase ROM.  Long term goals: restore normal function.  Prognosis: good.

## 2018-02-08 NOTE — MR AVS SNAPSHOT
"              After Visit Summary   2018    Niesha Lyle    MRN: [id number]           Patient Information     Date Of Birth          1960        Visit Information        Provider Department      2018 11:40 AM Felipe Morgan DC  [facility]        Today's Diagnoses     Segmental and somatic dysfunction of lumbar region    -  1    Acute left-sided low back pain without sciatica        Segmental and somatic dysfunction of thoracic region           Follow-ups after your visit        Who to contact     If you have questions or need follow up information about today's clinic visit or your schedule please contact  [facility] directly at [phone].  Normal or non-critical lab and imaging results will be communicated to you by [employer], letter or phone within 4 business days after the clinic has received the results. If you do not hear from us within 7 days, please contact the clinic through [employer] or phone. If you have a critical or abnormal lab result, we will notify you by phone as soon as possible.  Submit refill requests through [employer] or call your pharmacy and they will forward the refill request to us. Please allow 3 business days for your refill to be completed.          Additional Information About Your Visit        MyChart Information     [employer] lets you send messages to your doctor, view your test results, renew your prescriptions, schedule appointments and more. To sign up, go to [email or web address] . Click on \"Log in\" on the left side of the screen, which will take you to the Welcome page. Then click on \"Sign up Now\" on the right side of the page.     You will be asked to enter the access code listed below, as well as some personal information. Please follow the directions to create your username and password.     Your access code is: [email or web address]  Expires: 2018  4:37 PM     Your access code will  in 90 days. If you need help or a new code, please " call your [facility] clinic or [phone].        Care EveryWhere ID     This is your Care EveryWhere ID. This could be used by other organizations to access your [facility] medical records  [id number]         Blood Pressure from Last 3 Encounters:   01/31/18 164/84   10/02/17 (!) 186/103   08/27/16 (!) 170/102    Weight from Last 3 Encounters:   03/16/15 162 lb (73.5 kg)              We Performed the Following     CHIROPRAC MANIP,SPINAL,1-2 REGIONS        Primary Care Provider Office Phone # Fax #    Venus DIANA Donovan [phone]       [facility] [address]  [facility] [address]        Equal Access to Services     ARNULFO DEVINE : Hadii aad ku hadasho Soomaali, waaxda luqadaha, qaybta kaalmada adeegyada, waxay edinin rayna lucero . So [facility] [phone].    ATENCIÓN: Si habla español, tiene a walker disposición servicios gratuitos de asistencia lingüística. [facility] [phone].    We comply with applicable federal civil rights laws and [place] laws. We do not discriminate on the basis of race, color, national origin, age, disability, sex, sexual orientation, or gender identity.            Thank you!     Thank you for choosing  [facility]  for your care. Our goal is always to provide you with excellent care. Hearing back from our patients is one way we can continue to improve our services. Please take a few minutes to complete the written survey that you may receive in the mail after your visit with us. Thank you!             Your Updated Medication List - Protect others around you: Learn how to safely use, store and throw away your medicines at www.disposemymeds.org.          This list is accurate as of 2/8/18  1:36 PM.  Always use your most recent med list.                   Brand Name Dispense Instructions for use Diagnosis    ATENOLOL PO      Take 25 mg by mouth daily        CALCIUM + D PO      Take 1 tablet by mouth daily        CYMBALTA PO      Take by  mouth daily        FLEXERIL PO      Take 10 mg by mouth        fluticasone 50 MCG/ACT spray    FLONASE    1 Package    Spray 2 sprays into both nostrils daily        IBUPROFEN PO      Take 600 mg by mouth        ketorolac 10 MG tablet    TORADOL    10 tablet    Take 1 tablet (10 mg) by mouth every 6 hours as needed for moderate pain        LISINOPRIL PO      Take 20 mg by mouth daily        predniSONE 20 MG tablet    DELTASONE    10 tablet    Take two tablets (= 40mg) each day for 5 (five) days        SIMVASTATIN PO      Take by mouth At Bedtime        TRAZODONE HCL PO      Take by mouth At Bedtime        TYLENOL PO      Take 1,300 mg by mouth

## 2018-03-04 ENCOUNTER — HOSPITAL ENCOUNTER (EMERGENCY)
Facility: HOSPITAL | Age: 58
Discharge: HOME | End: 2018-03-04
Attending: PHYSICIAN ASSISTANT | Admitting: PHYSICIAN ASSISTANT
Payer: COMMERCIAL

## 2018-03-04 VITALS
SYSTOLIC BLOOD PRESSURE: 143 MMHG | RESPIRATION RATE: 14 BRPM | TEMPERATURE: 96.8 F | DIASTOLIC BLOOD PRESSURE: 86 MMHG | OXYGEN SATURATION: 97 %

## 2018-03-04 DIAGNOSIS — J32.9 RHINOSINUSITIS: ICD-10-CM

## 2018-03-04 DIAGNOSIS — J22 LRTI (LOWER RESPIRATORY TRACT INFECTION): ICD-10-CM

## 2018-03-04 PROCEDURE — G0463 HOSPITAL OUTPT CLINIC VISIT: HCPCS

## 2018-03-04 PROCEDURE — 99213 OFFICE O/P EST LOW 20 MIN: CPT | Performed by: PHYSICIAN ASSISTANT

## 2018-03-04 RX ORDER — CODEINE PHOSPHATE AND GUAIFENESIN 10; 100 MG/5ML; MG/5ML
1-2 SOLUTION ORAL EVERY 4 HOURS PRN
Qty: 120 ML | Refills: 0 | Status: SHIPPED | OUTPATIENT
Start: 2018-03-04 | End: 2018-08-23

## 2018-03-04 RX ORDER — BENZONATATE 200 MG/1
200 CAPSULE ORAL 3 TIMES DAILY PRN
Qty: 21 CAPSULE | Refills: 0 | Status: SHIPPED | OUTPATIENT
Start: 2018-03-04 | End: 2018-08-23

## 2018-03-04 RX ORDER — DOXYCYCLINE 100 MG/1
100 CAPSULE ORAL 2 TIMES DAILY
Qty: 14 CAPSULE | Refills: 0 | Status: SHIPPED | OUTPATIENT
Start: 2018-03-04 | End: 2018-03-11

## 2018-03-04 ASSESSMENT — ENCOUNTER SYMPTOMS
COUGH: 1
FEVER: 0
CHILLS: 0
MYALGIAS: 1
CARDIOVASCULAR NEGATIVE: 1
FATIGUE: 1
SINUS PRESSURE: 1
NAUSEA: 0
GASTROINTESTINAL NEGATIVE: 1
VOMITING: 0
PSYCHIATRIC NEGATIVE: 1

## 2018-03-04 NOTE — ED AVS SNAPSHOT
[facility]    [address]    [place] [address]    Phone:  [phone]                                       Niesha Lyle   MRN: [id number]    Department:  [facility]   Date of Visit:  3/4/2018           After Visit Summary Signature Page     I have received my discharge instructions, and my questions have been answered. I have discussed any challenges I see with this plan with the nurse or doctor.    ..........................................................................................................................................  Patient/Patient Representative Signature      ..........................................................................................................................................  Patient Representative Print Name and Relationship to Patient    ..................................................               ................................................  Date                                            Time    ..........................................................................................................................................  Reviewed by Signature/Title    ...................................................              ..............................................  Date                                                            Time

## 2018-03-04 NOTE — DISCHARGE INSTRUCTIONS
"- Cheratussin at night and tessalon during the day (may take 2-3 doses to work) as directed to help suppress cough. Fisherman Friend Extra Strong helps temporarily with cough/sore throat.  - Deep intentional breaths and coughing helps to ensure fresh air to lung bases, so suppress cough only to sleep or if it is causing significant discomfort.   - Plenty of fluids/broth as water is the best expectorant. Gargle saltwater.   - REST to help support immune function.  - May fill antibiotics in 2-3 days IF needed. This will cover chest and sinuses. Eat yogurt, kefir or take over-the-counter \"probiotic\" at least 2 hours before or after a dose of antibiotic. This will replace good bacteria that may have been lost due to the antibiotic. (This may also help to prevent yeast infections and upset stomach during the course of antibiotic.)      * Recheck with primary care in 5-7 days with poor improvement. To emergency department with worsening.    "

## 2018-03-04 NOTE — ED AVS SNAPSHOT
[facility]    [address] [address]    Phone:  [phone]                                       Niesha Lyle   MRN: [id number]    Department:  [facility]   Date of Visit:  3/4/2018           Patient Information     Date Of Birth          1960        Your diagnoses for this visit were:     LRTI (lower respiratory tract infection)        You were seen by Rudy Malloy PA.      Follow-up Information     Follow up with Venus Donovan NP In 1 week.    Specialty:  Nurse Practitioner    Contact information:    [facility]  [address] [facility] [address] [phone]          Follow up with [facility].    Specialty:  EMERGENCY MEDICINE    Why:  If symptoms worsen    Contact information:    [address] [address] [phone]    Additional information:    From [place]: Take [address]. Turn left at [address]. Turn left at the first stoplight on [address]. At the first stop sign, take a right onto [address]. Take a left into the parking lot and continue through until you reach the North enterance of the building.       From [place]: Take [address]. Take the [address] ramp towards [place]. Turn left onto [address]. Take a slight right onto [address]. Turn left at the first stoplight on [address]. At the first stop sign, take a right onto [address]. Take a left into the parking lot and continue through until you reach the North enterance of the building.       From [place]: Take [address]. Take a right at [address]. At the first stop sign, take a right onto [address]. Take a left into the parking lot and continue through until you reach the North enterance of the building.         Discharge Instructions       - Cheratussin at night and tessalon during the day (may take 2-3 doses to work) as directed  "to help suppress cough. Fisherman Friend Extra Strong helps temporarily with cough/sore throat.  - Deep intentional breaths and coughing helps to ensure fresh air to lung bases, so suppress cough only to sleep or if it is causing significant discomfort.   - Plenty of fluids/broth as water is the best expectorant. Gargle saltwater.   - REST to help support immune function.  - May fill antibiotics in 2-3 days IF needed. This will cover chest and sinuses. Eat yogurt, kefir or take over-the-counter \"probiotic\" at least 2 hours before or after a dose of antibiotic. This will replace good bacteria that may have been lost due to the antibiotic. (This may also help to prevent yeast infections and upset stomach during the course of antibiotic.)      * Recheck with primary care in 5-7 days with poor improvement. To emergency department with worsening.      Discharge References/Attachments     BRONCHITIS, NO ANTIBIOTIC (ADULT) (ENGLISH)         Review of your medicines      START taking        Dose / Directions Last dose taken    benzonatate 200 MG capsule   Commonly known as:  TESSALON   Dose:  200 mg   Quantity:  21 capsule        Take 1 capsule (200 mg) by mouth 3 times daily as needed for cough   Refills:  0        doxycycline 100 MG capsule   Commonly known as:  VIBRAMYCIN   Dose:  100 mg   Quantity:  14 capsule        Take 1 capsule (100 mg) by mouth 2 times daily for 7 days   Refills:  0        guaiFENesin-codeine 100-10 MG/5ML Soln solution   Commonly known as:  ROBITUSSIN AC   Dose:  1-2 tsp.   Quantity:  120 mL        Take 5-10 mLs by mouth every 4 hours as needed for cough   Refills:  0          Our records show that you are taking the medicines listed below. If these are incorrect, please call your family doctor or clinic.        Dose / Directions Last dose taken    ATENOLOL PO   Dose:  25 mg        Take 25 mg by mouth daily   Refills:  0        CALCIUM + D PO   Dose:  1 tablet        Take 1 tablet by mouth daily "   Refills:  0        CYMBALTA PO        Take by mouth daily   Refills:  0        FLEXERIL PO   Dose:  10 mg        Take 10 mg by mouth   Refills:  0        fluticasone 50 MCG/ACT spray   Commonly known as:  FLONASE   Dose:  2 spray   Quantity:  1 Package        Spray 2 sprays into both nostrils daily   Refills:  1        IBUPROFEN PO   Dose:  600 mg        Take 600 mg by mouth   Refills:  0        ketorolac 10 MG tablet   Commonly known as:  TORADOL   Dose:  10 mg   Quantity:  10 tablet        Take 1 tablet (10 mg) by mouth every 6 hours as needed for moderate pain   Refills:  0        LISINOPRIL PO   Dose:  20 mg        Take 20 mg by mouth daily   Refills:  0        PRILOSEC PO   Dose:  20 mg        Take 20 mg by mouth every morning   Refills:  0        SIMVASTATIN PO        Take by mouth At Bedtime   Refills:  0        TRAZODONE HCL PO        Take by mouth At Bedtime   Refills:  0        TYLENOL PO   Dose:  1300 mg        Take 1,300 mg by mouth   Refills:  0                Prescriptions were sent or printed at these locations (3 Prescriptions)                   [facility] [place] - [address] [facility] [address]   [address] [facility] [facility] [address] [facility] [address], [facility] [address]    Telephone:  [phone]   Fax:  [phone]   Hours:                  E-Prescribed (1 of 3)         benzonatate (TESSALON) 200 MG capsule                 Printed at Department/Unit printer (2 of 3)         guaiFENesin-codeine (ROBITUSSIN AC) 100-10 MG/5ML SOLN solution               doxycycline (VIBRAMYCIN) 100 MG capsule                Orders Needing Specimen Collection     None      Pending Results     No orders found from 3/2/2018 to 3/5/2018.            Pending Culture Results     No orders found from 3/2/2018 to 3/5/2018.            Thank you for choosing [facility]       Thank you for choosing [facility] for your care. Our goal is always to provide you with excellent care. Hearing back from our patients is one way we can continue to improve our services. Please take  "a few minutes to complete the written survey that you may receive in the mail after you visit with us. Thank you!        Patient [employer] Information     [employer] lets you send messages to your doctor, view your test results, renew your prescriptions, schedule appointments and more. To sign up, go to [email or web address] . Click on \"Log in\" on the left side of the screen, which will take you to the Welcome page. Then click on \"Sign up Now\" on the right side of the page.     You will be asked to enter the access code listed below, as well as some personal information. Please follow the directions to create your username and password.     Your access code is: [email or web address]  Expires: 2018  4:37 PM     Your access code will  in 90 days. If you need help or a new code, please call your [facility] clinic or [phone].        Care EveryWhere ID     This is your Care EveryWhere ID. This could be used by other organizations to access your [facility] medical records  [id number]        Equal Access to Services     : Hadii andres Aparicio, waaxda lujasper, qaybta kaalmanav garcia, janet lucero . So [facility] [phone].    ATENCIÓN: Si habla español, tiene a walker disposición servicios gratuitos de asistencia lingüística. Llame al [phone].    We comply with applicable federal civil rights laws and [place] laws. We do not discriminate on the basis of race, color, national origin, age, disability, sex, sexual orientation, or gender identity.            After Visit Summary       This is your record. Keep this with you and show to your community pharmacist(s) and doctor(s) at your next visit.                  "

## 2018-03-04 NOTE — ED PROVIDER NOTES
History     Chief Complaint   Patient presents with     Cough     x1 week. loss of voice. facial pain also noted today. denies fevers     HPI  Niesha Lyle is a 57 year old female who presents on day 8 of cough.  She reports symptoms started with slight head cold progressing to loss of voice with facial pain starting today.  She reports ongoing fatigue but no fever.  Cough is productive at times but tends to be dry and hacking.  She denies shortness of breath, but has been using an old inhaler.  Due to facial pain progressively worsening cough she is in for evaluation today as she needs to return to work tomorrow.  She has tried several OTCs for cough and cold that has given her no relief.    Problem List:    There are no active problems to display for this patient.       Past Medical History:    No past medical history on file.    Past Surgical History:    No past surgical history on file.    Family History:    No family history on file.    Social History:  Marital Status:   [2]  Social History   Substance Use Topics     Smoking status: Never Smoker     Smokeless tobacco: Never Used     Alcohol use No        Medications:      Omeprazole (PRILOSEC PO)   guaiFENesin-codeine (ROBITUSSIN AC) 100-10 MG/5ML SOLN solution   benzonatate (TESSALON) 200 MG capsule   doxycycline (VIBRAMYCIN) 100 MG capsule   Calcium Carbonate-Vitamin D (CALCIUM + D PO)   SIMVASTATIN PO   LISINOPRIL PO   DULoxetine HCl (CYMBALTA PO)   ATENOLOL PO   TRAZODONE HCL PO   IBUPROFEN PO   Cyclobenzaprine HCl (FLEXERIL PO)   ketorolac (TORADOL) 10 MG tablet   Acetaminophen (TYLENOL PO)   fluticasone (FLONASE) 50 MCG/ACT nasal spray         Review of Systems   Constitutional: Positive for fatigue. Negative for chills and fever.   HENT: Positive for congestion, postnasal drip and sinus pressure.    Respiratory: Positive for cough.    Cardiovascular: Negative.    Gastrointestinal: Negative.  Negative for nausea and vomiting.    Musculoskeletal: Positive for myalgias.   Skin: Negative for rash.   Psychiatric/Behavioral: Negative.        Physical Exam   BP: 143/86  Heart Rate: 84  Temp: 96.8  F (36  C)  Resp: 14  SpO2: 97 %      Physical Exam   Constitutional: She is oriented to person, place, and time. She appears well-developed and well-nourished.   HENT:   Head: Normocephalic and atraumatic.   Right Ear: External ear normal.   Left Ear: External ear normal.   Nose: Mucosal edema present.   Mouth/Throat: Posterior oropharyngeal erythema (cobblestoning) present. No posterior oropharyngeal edema.   Eyes: Conjunctivae are normal.   Neck: Neck supple.   Cardiovascular: Normal rate and normal heart sounds.    Pulmonary/Chest: Effort normal and breath sounds normal. She has no wheezes. She has no rales.   Neurological: She is alert and oriented to person, place, and time.   Skin: Skin is warm and dry.   Psychiatric: She has a normal mood and affect.   Nursing note and vitals reviewed.      ED Course     ED Course     Procedures    Assessments & Plan (with Medical Decision Making)     I have reviewed the nursing notes.    I have reviewed the findings, diagnosis, plan and need for follow up with the patient.      Discharge Medication List as of 3/4/2018 11:25 AM      START taking these medications    Details   guaiFENesin-codeine (ROBITUSSIN AC) 100-10 MG/5ML SOLN solution Take 5-10 mLs by mouth every 4 hours as needed for cough, Disp-120 mL, R-0, Local Print      benzonatate (TESSALON) 200 MG capsule Take 1 capsule (200 mg) by mouth 3 times daily as needed for cough, Disp-21 capsule, R-0, E-Prescribe      doxycycline (VIBRAMYCIN) 100 MG capsule Take 1 capsule (100 mg) by mouth 2 times daily for 7 days, Disp-14 capsule, R-0, Local Print             Final diagnoses:   LRTI (lower respiratory tract infection)   Rhinosinusitis   Will treat symptomatically with tessalon and Cheratussin. Continue with rest and fluids. No fever or focal tenderness of  sinuses at this time, so Niesha is advised to use a sinus flush or nasal spray and gargle to treat symptomatically. If symptoms persist into day 10 (2-3 days), especially with facial pain, will fill antibiotic as she does not want exposure to antibiotic unless absolutely needed. She will return here vs PCP to recheck if hesitant to start antibiotics or with ANY concern for worsening despite treatment. Pt is agreeable to plan and all questions answered prior to discharge.     3/4/2018   [facility]     Rudy Malloy PA  03/04/18 1956

## 2018-03-04 NOTE — ED NOTES
Pt presents today alone for c/o a tight barking cough for over a week and has some laryngitis with it.

## 2018-04-30 ENCOUNTER — OFFICE VISIT (OUTPATIENT)
Dept: CHIROPRACTIC MEDICINE | Facility: OTHER | Age: 58
End: 2018-04-30
Attending: CHIROPRACTOR
Payer: COMMERCIAL

## 2018-04-30 DIAGNOSIS — M99.03 SEGMENTAL AND SOMATIC DYSFUNCTION OF LUMBAR REGION: Primary | ICD-10-CM

## 2018-04-30 DIAGNOSIS — M54.50 ACUTE BILATERAL LOW BACK PAIN WITHOUT SCIATICA: ICD-10-CM

## 2018-04-30 DIAGNOSIS — M99.02 SEGMENTAL AND SOMATIC DYSFUNCTION OF THORACIC REGION: ICD-10-CM

## 2018-04-30 DIAGNOSIS — M99.01 SEGMENTAL AND SOMATIC DYSFUNCTION OF CERVICAL REGION: ICD-10-CM

## 2018-04-30 PROCEDURE — 98941 CHIROPRACT MANJ 3-4 REGIONS: CPT | Mod: AT | Performed by: CHIROPRACTOR

## 2018-04-30 NOTE — MR AVS SNAPSHOT
"              After Visit Summary   4/30/2018    Niesha Lyle    MRN: [id number]           Patient Information     Date Of Birth          1960        Visit Information        Provider Department      4/30/2018 2:10 PM Felipe Morgan DC  [facility]        Today's Diagnoses     Segmental and somatic dysfunction of lumbar region    -  1    Acute bilateral low back pain without sciatica        Segmental and somatic dysfunction of thoracic region        Segmental and somatic dysfunction of cervical region           Follow-ups after your visit        Who to contact     If you have questions or need follow up information about today's clinic visit or your schedule please contact  [facility] directly at [phone].  Normal or non-critical lab and imaging results will be communicated to you by Kalibrrhart, letter or phone within 4 business days after the clinic has received the results. If you do not hear from us within 7 days, please contact the clinic through Kalibrrhart or phone. If you have a critical or abnormal lab result, we will notify you by phone as soon as possible.  Submit refill requests through [employer] or call your pharmacy and they will forward the refill request to us. Please allow 3 business days for your refill to be completed.          Additional Information About Your Visit        MyChart Information     [employer] lets you send messages to your doctor, view your test results, renew your prescriptions, schedule appointments and more. To sign up, go to [email or web address] . Click on \"Log in\" on the left side of the screen, which will take you to the Welcome page. Then click on \"Sign up Now\" on the right side of the page.     You will be asked to enter the access code listed below, as well as some personal information. Please follow the directions to create your username and password.     Your access code is: [email or web address]  Expires: 7/30/2018  8:36 AM     Your access code will "  in 90 days. If you need help or a new code, please call your [facility] clinic or [phone].        Care EveryWhere ID     This is your Care EveryWhere ID. This could be used by other organizations to access your [facility] medical records  [id number]         Blood Pressure from Last 3 Encounters:   18 143/86   18 164/84   10/02/17 (!) 186/103    Weight from Last 3 Encounters:   03/16/15 162 lb (73.5 kg)              We Performed the Following     CHIROPRAC MANIP,SPINAL,3-4 REGIONS        Primary Care Provider Office Phone # Fax #    Venus Venus, DIANA [phone]       [facility] [address] [facility] [address]        Equal Access to Services     ARNULFO DEVINE : Hadii andres leo hadasho Soomaali, waaxda luqadaha, qaybta kaalmada adeegyada, janet lucero . So [facility] [phone].    ATENCIÓN: Si habla español, tiene a walker disposición servicios gratuitos de asistencia lingüística. Llame al [phone].    We comply with applicable federal civil rights laws and [place] laws. We do not discriminate on the basis of race, color, national origin, age, disability, sex, sexual orientation, or gender identity.            Thank you!     Thank you for choosing  [facility]  for your care. Our goal is always to provide you with excellent care. Hearing back from our patients is one way we can continue to improve our services. Please take a few minutes to complete the written survey that you may receive in the mail after your visit with us. Thank you!             Your Updated Medication List - Protect others around you: Learn how to safely use, store and throw away your medicines at www.disposemymeds.org.          This list is accurate as of 18 11:59 PM.  Always use your most recent med list.                   Brand Name Dispense Instructions for use Diagnosis    ATENOLOL PO      Take 25 mg by mouth daily        benzonatate 200 MG capsule     TESSALON    21 capsule    Take 1 capsule (200 mg) by mouth 3 times daily as needed for cough        CALCIUM + D PO      Take 1 tablet by mouth daily        CYMBALTA PO      Take by mouth daily        FLEXERIL PO      Take 10 mg by mouth        fluticasone 50 MCG/ACT spray    FLONASE    1 Package    Spray 2 sprays into both nostrils daily        guaiFENesin-codeine 100-10 MG/5ML Soln solution    ROBITUSSIN AC    120 mL    Take 5-10 mLs by mouth every 4 hours as needed for cough        IBUPROFEN PO      Take 600 mg by mouth        ketorolac 10 MG tablet    TORADOL    10 tablet    Take 1 tablet (10 mg) by mouth every 6 hours as needed for moderate pain        LISINOPRIL PO      Take 20 mg by mouth daily        PRILOSEC PO      Take 20 mg by mouth every morning        SIMVASTATIN PO      Take by mouth At Bedtime        TRAZODONE HCL PO      Take by mouth At Bedtime        TYLENOL PO      Take 1,300 mg by mouth

## 2018-06-12 ENCOUNTER — OFFICE VISIT (OUTPATIENT)
Dept: CHIROPRACTIC MEDICINE | Facility: OTHER | Age: 58
End: 2018-06-12
Attending: CHIROPRACTOR
Payer: COMMERCIAL

## 2018-06-12 DIAGNOSIS — M99.01 SEGMENTAL AND SOMATIC DYSFUNCTION OF CERVICAL REGION: ICD-10-CM

## 2018-06-12 DIAGNOSIS — M99.03 SEGMENTAL AND SOMATIC DYSFUNCTION OF LUMBAR REGION: Primary | ICD-10-CM

## 2018-06-12 DIAGNOSIS — M99.02 SEGMENTAL AND SOMATIC DYSFUNCTION OF THORACIC REGION: ICD-10-CM

## 2018-06-12 DIAGNOSIS — M54.50 ACUTE BILATERAL LOW BACK PAIN WITHOUT SCIATICA: ICD-10-CM

## 2018-06-12 PROCEDURE — 98941 CHIROPRACT MANJ 3-4 REGIONS: CPT | Mod: AT | Performed by: CHIROPRACTOR

## 2018-06-12 NOTE — MR AVS SNAPSHOT
"              After Visit Summary   6/12/2018    Niesha Lyle    MRN: [id number]           Patient Information     Date Of Birth          1960        Visit Information        Provider Department      6/12/2018 9:20 AM Felipe Morgan [facility]        Today's Diagnoses     Segmental and somatic dysfunction of lumbar region    -  1    Acute bilateral low back pain without sciatica        Segmental and somatic dysfunction of thoracic region        Segmental and somatic dysfunction of cervical region           Follow-ups after your visit        Who to contact     If you have questions or need follow up information about today's clinic visit or your schedule please contact  [facility] directly at [phone].  Normal or non-critical lab and imaging results will be communicated to you by Jia.comhart, letter or phone within 4 business days after the clinic has received the results. If you do not hear from us within 7 days, please contact the clinic through MyChart or phone. If you have a critical or abnormal lab result, we will notify you by phone as soon as possible.  Submit refill requests through [employer] or call your pharmacy and they will forward the refill request to us. Please allow 3 business days for your refill to be completed.          Additional Information About Your Visit        MyChart Information     [employer] lets you send messages to your doctor, view your test results, renew your prescriptions, schedule appointments and more. To sign up, go to [email or web address] . Click on \"Log in\" on the left side of the screen, which will take you to the Welcome page. Then click on \"Sign up Now\" on the right side of the page.     You will be asked to enter the access code listed below, as well as some personal information. Please follow the directions to create your username and password.     Your access code is: [email or web address]  Expires: 7/30/2018  8:36 AM     Your access code will "  in 90 days. If you need help or a new code, please call your [facility] clinic or [phone].        Care EveryWhere ID     This is your Care EveryWhere ID. This could be used by other organizations to access your [facility] medical records  [id number]         Blood Pressure from Last 3 Encounters:   18 143/86   18 164/84   10/02/17 (!) 186/103    Weight from Last 3 Encounters:   03/16/15 162 lb (73.5 kg)              We Performed the Following     CHIROPRAC MANIP,SPINAL,3-4 REGIONS        Primary Care Provider Office Phone # Fax #    Venus Venus, DIANA [phone]       [facility] [address] [facility] [address]        Equal Access to Services     ARNULFO DEVINE : Hadii andres leo hadasho Soomaali, waaxda luqadaha, qaybta kaalmada adeegyada, janet lucero . So [facility] [phone].    ATENCIÓN: Si habla español, tiene a walker disposición servicios gratuitos de asistencia lingüística. Llame al [phone].    We comply with applicable federal civil rights laws and [place] laws. We do not discriminate on the basis of race, color, national origin, age, disability, sex, sexual orientation, or gender identity.            Thank you!     Thank you for choosing  [facility]  for your care. Our goal is always to provide you with excellent care. Hearing back from our patients is one way we can continue to improve our services. Please take a few minutes to complete the written survey that you may receive in the mail after your visit with us. Thank you!             Your Updated Medication List - Protect others around you: Learn how to safely use, store and throw away your medicines at www.disposemymeds.org.          This list is accurate as of 18  2:34 PM.  Always use your most recent med list.                   Brand Name Dispense Instructions for use Diagnosis    ATENOLOL PO      Take 25 mg by mouth daily        benzonatate 200 MG capsule     TESSALON    21 capsule    Take 1 capsule (200 mg) by mouth 3 times daily as needed for cough        CALCIUM + D PO      Take 1 tablet by mouth daily        CYMBALTA PO      Take by mouth daily        FLEXERIL PO      Take 10 mg by mouth        fluticasone 50 MCG/ACT spray    FLONASE    1 Package    Spray 2 sprays into both nostrils daily        guaiFENesin-codeine 100-10 MG/5ML Soln solution    ROBITUSSIN AC    120 mL    Take 5-10 mLs by mouth every 4 hours as needed for cough        IBUPROFEN PO      Take 600 mg by mouth        ketorolac 10 MG tablet    TORADOL    10 tablet    Take 1 tablet (10 mg) by mouth every 6 hours as needed for moderate pain        LISINOPRIL PO      Take 20 mg by mouth daily        PRILOSEC PO      Take 20 mg by mouth every morning        SIMVASTATIN PO      Take by mouth At Bedtime        TRAZODONE HCL PO      Take by mouth At Bedtime        TYLENOL PO      Take 1,300 mg by mouth

## 2018-06-12 NOTE — PROGRESS NOTES
Subjective Finding:    Chief compalint: Patient presents with:  Back Pain  Neck Pain  , Pain Scale: 3/10, Intensity: dull and ache, Duration: 2 days, Radiating: no.    Date of injury:     Activities that the pain restricts:   Home/household/hobbies/social activities: yes.  Work duties: no.  Sleep: no.  Makes symptoms better: rest.  Makes symptoms worse: activity and lumbar flexion.  Have you seen anyone else for the symptoms? no.  Work related: .  Automobile related injury: no.    Objective and Assessment:    Posture Analysis:   High shoulder: .  Head tilt: .  High iliac crest: .  Head carriage: neutral.  Thoracic Kyphosis: neutral.  Lumbar Lordosis: forward.    Lumbar Range of Motion: extension decreased, left lateral flexion decreased and right lateral flexion decreased.  Cervical Range of Motion: .  Thoracic Range of Motion: extension decreased.  Extremity Range of Motion: .    Palpation:   Left TL jxn Pain   Right SI tightness    Segmental dysfunction pre-treatment and treatment area: T10  Left  L23 .  C56    Assessment post-treatment:  Cervical: .  Thoracic: ROM increased.  Lumbar: ROM increased and pain and tenderness decreased.    Comments: .      Complicating Factors: .    Plan / Procedure:    Treatment plan: PRN.  Instructed patient: stretch as instructed at visit and walk 10 minutes.  Short term goals: increase ROM.  Long term goals: restore normal function.  Prognosis: good.

## 2018-06-26 ENCOUNTER — RADIANT APPOINTMENT (OUTPATIENT)
Dept: MAMMOGRAPHY | Facility: OTHER | Age: 58
End: 2018-06-26
Attending: NURSE PRACTITIONER
Payer: COMMERCIAL

## 2018-06-26 DIAGNOSIS — Z12.31 VISIT FOR SCREENING MAMMOGRAM: ICD-10-CM

## 2018-06-26 PROCEDURE — 77067 SCR MAMMO BI INCL CAD: CPT | Mod: TC

## 2018-06-27 ENCOUNTER — HOSPITAL ENCOUNTER (OUTPATIENT)
Dept: ULTRASOUND IMAGING | Facility: HOSPITAL | Age: 58
Discharge: HOME | End: 2018-06-27
Attending: NURSE PRACTITIONER | Admitting: NURSE PRACTITIONER
Payer: COMMERCIAL

## 2018-06-27 DIAGNOSIS — R92.8 ABNORMAL MAMMOGRAM: ICD-10-CM

## 2018-06-27 PROCEDURE — 76642 ULTRASOUND BREAST LIMITED: CPT | Mod: TC,LT

## 2018-07-23 ENCOUNTER — OFFICE VISIT (OUTPATIENT)
Dept: CHIROPRACTIC MEDICINE | Facility: OTHER | Age: 58
End: 2018-07-23
Attending: CHIROPRACTOR
Payer: COMMERCIAL

## 2018-07-23 DIAGNOSIS — M99.02 SEGMENTAL AND SOMATIC DYSFUNCTION OF THORACIC REGION: ICD-10-CM

## 2018-07-23 DIAGNOSIS — M99.03 SEGMENTAL AND SOMATIC DYSFUNCTION OF LUMBAR REGION: Primary | ICD-10-CM

## 2018-07-23 DIAGNOSIS — M99.01 SEGMENTAL AND SOMATIC DYSFUNCTION OF CERVICAL REGION: ICD-10-CM

## 2018-07-23 DIAGNOSIS — M54.50 ACUTE BILATERAL LOW BACK PAIN WITHOUT SCIATICA: ICD-10-CM

## 2018-07-23 PROCEDURE — 98941 CHIROPRACT MANJ 3-4 REGIONS: CPT | Mod: AT | Performed by: CHIROPRACTOR

## 2018-07-23 NOTE — MR AVS SNAPSHOT
After Visit Summary   7/23/2018    Niesha Lyle    MRN: [id number]           Patient Information     Date Of Birth          1960        Visit Information        Provider Department      7/23/2018 9:40 AM Felipe Morgan DC  [facility]        Today's Diagnoses     Segmental and somatic dysfunction of lumbar region    -  1    Acute bilateral low back pain without sciatica        Segmental and somatic dysfunction of thoracic region        Segmental and somatic dysfunction of cervical region           Follow-ups after your visit        Who to contact     If you have questions or need follow up information about today's clinic visit or your schedule please contact  [facility] directly at [phone].  Normal or non-critical lab and imaging results will be communicated to you by MyChart, letter or phone within 4 business days after the clinic has received the results. If you do not hear from us within 7 days, please contact the clinic through MyChart or phone. If you have a critical or abnormal lab result, we will notify you by phone as soon as possible.  Submit refill requests through [employer] or call your pharmacy and they will forward the refill request to us. Please allow 3 business days for your refill to be completed.          Additional Information About Your Visit        Care EveryWhere ID     This is your Care EveryWhere ID. This could be used by other organizations to access your [facility] medical records  [id number]         Blood Pressure from Last 3 Encounters:   03/04/18 143/86   01/31/18 164/84   10/02/17 (!) 186/103    Weight from Last 3 Encounters:   03/16/15 162 lb (73.5 kg)              We Performed the Following     CHIROPRAC MANIP,SPINAL,3-4 REGIONS        Primary Care Provider Office Phone # Fax #    Venus Donovan [phone]       [facility] [address] [facility] [address]        Equal Access to Services      ARNULFO [facility]: Hadii aad ku hadcarroll Solevonali, waaxda luqadaha, qaybta kaalmada adeegcyn, janet israel codianthony mckee charlie jazmine . So [facility] [phone].    ATENCIÓN: Si habla español, tiene a walker disposición servicios gratuitos de asistencia lingüística. Llame al [phone].    We comply with applicable federal civil rights laws and [place] laws. We do not discriminate on the basis of race, color, national origin, age, disability, sex, sexual orientation, or gender identity.            Thank you!     Thank you for choosing  [facility]  for your care. Our goal is always to provide you with excellent care. Hearing back from our patients is one way we can continue to improve our services. Please take a few minutes to complete the written survey that you may receive in the mail after your visit with us. Thank you!             Your Updated Medication List - Protect others around you: Learn how to safely use, store and throw away your medicines at www.disposemymeds.org.          This list is accurate as of 7/23/18 11:29 AM.  Always use your most recent med list.                   Brand Name Dispense Instructions for use Diagnosis    ATENOLOL PO      Take 25 mg by mouth daily        benzonatate 200 MG capsule    TESSALON    21 capsule    Take 1 capsule (200 mg) by mouth 3 times daily as needed for cough        CALCIUM + D PO      Take 1 tablet by mouth daily        CYMBALTA PO      Take by mouth daily        FLEXERIL PO      Take 10 mg by mouth        fluticasone 50 MCG/ACT spray    FLONASE    1 Package    Spray 2 sprays into both nostrils daily        guaiFENesin-codeine 100-10 MG/5ML Soln solution    ROBITUSSIN AC    120 mL    Take 5-10 mLs by mouth every 4 hours as needed for cough        IBUPROFEN PO      Take 600 mg by mouth        ketorolac 10 MG tablet    TORADOL    10 tablet    Take 1 tablet (10 mg) by mouth every 6 hours as needed for moderate pain        LISINOPRIL PO      Take 20 mg by mouth  daily        PRILOSEC PO      Take 20 mg by mouth every morning        SIMVASTATIN PO      Take by mouth At Bedtime        TRAZODONE HCL PO      Take by mouth At Bedtime        TYLENOL PO      Take 1,300 mg by mouth

## 2018-08-01 ENCOUNTER — OFFICE VISIT (OUTPATIENT)
Dept: CHIROPRACTIC MEDICINE | Facility: OTHER | Age: 58
End: 2018-08-01
Attending: CHIROPRACTOR
Payer: COMMERCIAL

## 2018-08-01 DIAGNOSIS — M99.01 SEGMENTAL AND SOMATIC DYSFUNCTION OF CERVICAL REGION: ICD-10-CM

## 2018-08-01 DIAGNOSIS — M99.02 SEGMENTAL AND SOMATIC DYSFUNCTION OF THORACIC REGION: ICD-10-CM

## 2018-08-01 DIAGNOSIS — M54.50 ACUTE BILATERAL LOW BACK PAIN WITHOUT SCIATICA: ICD-10-CM

## 2018-08-01 DIAGNOSIS — M99.03 SEGMENTAL AND SOMATIC DYSFUNCTION OF LUMBAR REGION: Primary | ICD-10-CM

## 2018-08-01 PROCEDURE — 98941 CHIROPRACT MANJ 3-4 REGIONS: CPT | Mod: AT | Performed by: CHIROPRACTOR

## 2018-08-01 NOTE — MR AVS SNAPSHOT
After Visit Summary   8/1/2018    Niesha Lyle    MRN: [id number]           Patient Information     Date Of Birth          1960        Visit Information        Provider Department      8/1/2018 9:50 AM Felipe Morgan DC  [facility]        Today's Diagnoses     Segmental and somatic dysfunction of lumbar region    -  1    Acute bilateral low back pain without sciatica        Segmental and somatic dysfunction of thoracic region        Segmental and somatic dysfunction of cervical region           Follow-ups after your visit        Who to contact     If you have questions or need follow up information about today's clinic visit or your schedule please contact  [facility] directly at [phone].  Normal or non-critical lab and imaging results will be communicated to you by MyChart, letter or phone within 4 business days after the clinic has received the results. If you do not hear from us within 7 days, please contact the clinic through MyChart or phone. If you have a critical or abnormal lab result, we will notify you by phone as soon as possible.  Submit refill requests through [employer] or call your pharmacy and they will forward the refill request to us. Please allow 3 business days for your refill to be completed.          Additional Information About Your Visit        Care EveryWhere ID     This is your Care EveryWhere ID. This could be used by other organizations to access your [facility] medical records  [id number]         Blood Pressure from Last 3 Encounters:   03/04/18 143/86   01/31/18 164/84   10/02/17 (!) 186/103    Weight from Last 3 Encounters:   03/16/15 162 lb (73.5 kg)              We Performed the Following     CHIROPRAC MANIP,SPINAL,3-4 REGIONS        Primary Care Provider Office Phone # Fax #    Venus Donovan [phone]       [facility] [address] [facility] [address]        Equal Access to Services      ARNULFO [facility]: Hadii aad ku hadcarroll Solevonali, waaxda luqadaha, qaybta kaalmada adeegcyn, janet israel codianthony leonechris guerra jazmine . So [facility] [phone].    ATENCIÓN: Si habla español, tiene a walker disposición servicios gratuitos de asistencia lingüística. Llame al [phone].    We comply with applicable federal civil rights laws and [place] laws. We do not discriminate on the basis of race, color, national origin, age, disability, sex, sexual orientation, or gender identity.            Thank you!     Thank you for choosing  [facility]  for your care. Our goal is always to provide you with excellent care. Hearing back from our patients is one way we can continue to improve our services. Please take a few minutes to complete the written survey that you may receive in the mail after your visit with us. Thank you!             Your Updated Medication List - Protect others around you: Learn how to safely use, store and throw away your medicines at www.disposemymeds.org.          This list is accurate as of 8/1/18  1:09 PM.  Always use your most recent med list.                   Brand Name Dispense Instructions for use Diagnosis    ATENOLOL PO      Take 25 mg by mouth daily        benzonatate 200 MG capsule    TESSALON    21 capsule    Take 1 capsule (200 mg) by mouth 3 times daily as needed for cough        CALCIUM + D PO      Take 1 tablet by mouth daily        CYMBALTA PO      Take by mouth daily        FLEXERIL PO      Take 10 mg by mouth        fluticasone 50 MCG/ACT spray    FLONASE    1 Package    Spray 2 sprays into both nostrils daily        guaiFENesin-codeine 100-10 MG/5ML Soln solution    ROBITUSSIN AC    120 mL    Take 5-10 mLs by mouth every 4 hours as needed for cough        IBUPROFEN PO      Take 600 mg by mouth        ketorolac 10 MG tablet    TORADOL    10 tablet    Take 1 tablet (10 mg) by mouth every 6 hours as needed for moderate pain        LISINOPRIL PO      Take 20 mg by mouth daily         PRILOSEC PO      Take 20 mg by mouth every morning        SIMVASTATIN PO      Take by mouth At Bedtime        TRAZODONE HCL PO      Take by mouth At Bedtime        TYLENOL PO      Take 1,300 mg by mouth

## 2018-08-23 ENCOUNTER — HOSPITAL ENCOUNTER (EMERGENCY)
Facility: HOSPITAL | Age: 58
Discharge: HOME | End: 2018-08-23
Attending: NURSE PRACTITIONER | Admitting: NURSE PRACTITIONER
Payer: COMMERCIAL

## 2018-08-23 ENCOUNTER — APPOINTMENT (OUTPATIENT)
Dept: GENERAL RADIOLOGY | Facility: HOSPITAL | Age: 58
End: 2018-08-23
Attending: NURSE PRACTITIONER
Payer: COMMERCIAL

## 2018-08-23 VITALS
OXYGEN SATURATION: 100 % | SYSTOLIC BLOOD PRESSURE: 166 MMHG | BODY MASS INDEX: 26.52 KG/M2 | DIASTOLIC BLOOD PRESSURE: 106 MMHG | WEIGHT: 165 LBS | TEMPERATURE: 97.6 F | RESPIRATION RATE: 16 BRPM | HEIGHT: 66 IN

## 2018-08-23 DIAGNOSIS — S46.912A STRAIN OF LEFT SHOULDER, INITIAL ENCOUNTER: ICD-10-CM

## 2018-08-23 DIAGNOSIS — M54.10 RADICULOPATHY AFFECTING UPPER EXTREMITY: ICD-10-CM

## 2018-08-23 PROCEDURE — 73030 X-RAY EXAM OF SHOULDER: CPT | Mod: TC,LT

## 2018-08-23 PROCEDURE — 99213 OFFICE O/P EST LOW 20 MIN: CPT | Performed by: NURSE PRACTITIONER

## 2018-08-23 PROCEDURE — G0463 HOSPITAL OUTPT CLINIC VISIT: HCPCS

## 2018-08-23 RX ORDER — PREDNISONE 20 MG/1
TABLET ORAL
Qty: 10 TABLET | Refills: 0 | Status: SHIPPED | OUTPATIENT
Start: 2018-08-23 | End: 2018-09-26

## 2018-08-23 ASSESSMENT — ENCOUNTER SYMPTOMS
COLOR CHANGE: 0
MYALGIAS: 1
ARTHRALGIAS: 1
WOUND: 0
APPETITE CHANGE: 0
CHILLS: 0
NUMBNESS: 1
FATIGUE: 0
FEVER: 0

## 2018-08-23 NOTE — ED PROVIDER NOTES
"  History     Chief Complaint   Patient presents with     Shoulder Pain     Left shoulder     The history is provided by the patient. No  was used.     Niesha Lyle is a 58 year old female who presents today with a CC of left shoulder pain.  The pain has been intermittent x 3-4 weeks.  She lifted a 100 pound dog into a kennel in the back a van today and exacerbated the pain.  She is now having intermittent numbness and tingling in her left hand.  She has not taken anything for pain.  No traumatic injury, fevers or chills.  She has not seen a provider in the past for the shoulder pain.  No distant history of left shoulder injury.    Problem List:    There are no active problems to display for this patient.       Past Medical History:    History reviewed. No pertinent past medical history.    Past Surgical History:    History reviewed. No pertinent surgical history.    Family History:    No family history on file.    Social History:  Marital Status:   [2]  Social History   Substance Use Topics     Smoking status: Never Smoker     Smokeless tobacco: Never Used     Alcohol use No        Medications:      Acetaminophen (TYLENOL PO)   ATENOLOL PO   Calcium Carbonate-Vitamin D (CALCIUM + D PO)   Cyclobenzaprine HCl (FLEXERIL PO)   DULoxetine HCl (CYMBALTA PO)   IBUPROFEN PO   LISINOPRIL PO   Omeprazole (PRILOSEC PO)   predniSONE (DELTASONE) 20 MG tablet   SIMVASTATIN PO   TRAZODONE HCL PO   fluticasone (FLONASE) 50 MCG/ACT nasal spray         Review of Systems   Constitutional: Negative for appetite change, chills, fatigue and fever.   Musculoskeletal: Positive for arthralgias and myalgias.   Skin: Negative for color change, rash and wound.   Neurological: Positive for numbness (intermittent left hand).       Physical Exam   BP: (!) 166/106  Heart Rate: 94  Temp: 97.6  F (36.4  C)  Resp: 16  Height: 167.6 cm (5' 6\")  Weight: 74.8 kg (165 lb)  SpO2: 100 %      Physical Exam   Constitutional: " She appears well-developed.   HENT:   Head: Normocephalic and atraumatic.   Eyes: Conjunctivae are normal.   Neck: Normal range of motion. Neck supple. Muscular tenderness (left trapezius) present. No spinous process tenderness present. No rigidity. No edema and normal range of motion present.   Cardiovascular: Normal rate and regular rhythm.    Pulmonary/Chest: Effort normal and breath sounds normal.   Musculoskeletal:        Left shoulder: She exhibits bony tenderness (Anterior GH joint). She exhibits normal range of motion, no swelling, no effusion, no crepitus, no deformity, no laceration, normal pulse and normal strength.   Lymphadenopathy:     She has no cervical adenopathy.   Nursing note and vitals reviewed.      ED Course     ED Course     Procedures    Results for orders placed or performed during the hospital encounter of 08/23/18   XR Shoulder Left G/E 3 Views    Narrative    PROCEDURE:  XR SHOULDER LT G/E 3 VW    HISTORY: injury;     COMPARISON:  None.    TECHNIQUE:  4 views of the left shoulder were obtained.    FINDINGS:  No fracture or dislocation is identified. The joint spaces  are preserved.        Impression    IMPRESSION: No acute fracture.      ALYCIA BERMEO MD     Medications - No data to display      Assessments & Plan (with Medical Decision Making)     I have reviewed the nursing notes.    I have reviewed the findings, diagnosis, plan and need for follow up with the patient.  ASSESSMENT / PLAN:  (U91.342Z) Strain of left shoulder, initial encounter  Comment: s/p lifting ~ 100 pounds  Plan:  Rest, ice 20 minutes at least 4 times daily for the first 48 hours, then ice and/or heat as needed for symptomatic relief   Elevate affected area as much as possible   Prednisone as prescribed, discussed risk and possible side effects, patient has taken in the past and tolerated well   Tylenol as needed for pain relief   Sling for comfort/support   Follow up with PCP in 1-2 weeks if symptoms are not  improving, sooner if symptoms are worsening      (M54.10) Radiculopathy affecting upper extremity  Comment: s/p shoulder strain  Plan:   See above        Discharge Medication List as of 8/23/2018 11:33 AM      START taking these medications    Details   predniSONE (DELTASONE) 20 MG tablet Take two tablets (= 40mg) each day for 5 (five) days, Disp-10 tablet, R-0, E-Prescribe             Final diagnoses:   Strain of left shoulder, initial encounter   Radiculopathy affecting upper extremity       8/23/2018   [facility]     Tosha Ryan NP  08/25/18 [id number]

## 2018-08-23 NOTE — ED TRIAGE NOTES
Pt presents today with c/o left shoulder pain. Pt states it has been sore for a while and now she thinks she pulled something this morning. Pt states her fingers and numb and tingling. Pt has not taken any tylenol or ibuprofen. Rates pain a 7/10

## 2018-08-23 NOTE — ED AVS SNAPSHOT
[facility]    [address] [address]    Phone:  [phone]                                       Niesha Lyle   MRN: [id number]    Department:  [facility]   Date of Visit:  8/23/2018           Patient Information     Date Of Birth          1960        Your diagnoses for this visit were:     Strain of left shoulder, initial encounter     Radiculopathy affecting upper extremity        You were seen by Tosha Ryan NP.      Follow-up Information     Follow up with [facility].    Specialty:  EMERGENCY MEDICINE    Why:  As needed, If symptoms worsen, or concerns develop    Contact information:    [address] [address] [phone]    Additional information:    From [facility]: Take [address]. Turn left at [address]. Turn left at the first stoplight on [address]. At the first stop sign, take a right onto [address]. Take a left into the parking lot and continue through until you reach the North enterance of the building.       From [place]: Take [address]. Take the [address] ramp towards [place]. Turn left onto [address]. Take a slight right onto [address]. Turn left at the first stoplight on [address]. At the first stop sign, take a right onto [address]. Take a left into the parking lot and continue through until you reach the North enterance of the building.       From [place]: Take [address]. Take a right at [address]. At the first stop sign, take a right onto [address]. Take a left into the parking lot and continue through until you reach the North enterance of the building.         Follow up with Venus Donovan NP. Go on 8/27/2018.    Specialty:  Nurse Practitioner    Why:  Keep scheduled appointment with Venus on Monday    Contact information:    [facility]  [address] [facility]  [place] [address]  [phone]        Discharge References/Attachments     SHOULDER IMPINGEMENT SYNDROME (ENGLISH)         Review of your medicines      START taking        Dose / Directions Last dose taken    predniSONE 20 MG tablet   Commonly known as:  DELTASONE   Quantity:  10 tablet        Take two tablets (= 40mg) each day for 5 (five) days   Refills:  0          Our records show that you are taking the medicines listed below. If these are incorrect, please call your family doctor or clinic.        Dose / Directions Last dose taken    ATENOLOL PO   Dose:  25 mg        Take 25 mg by mouth daily   Refills:  0        CALCIUM + D PO   Dose:  1 tablet        Take 1 tablet by mouth daily   Refills:  0        CYMBALTA PO        Take by mouth daily   Refills:  0        FLEXERIL PO   Dose:  10 mg        Take 10 mg by mouth   Refills:  0        fluticasone 50 MCG/ACT spray   Commonly known as:  FLONASE   Dose:  2 spray   Quantity:  1 Package        Spray 2 sprays into both nostrils daily   Refills:  1        IBUPROFEN PO   Dose:  600 mg        Take 600 mg by mouth   Refills:  0        LISINOPRIL PO   Dose:  20 mg        Take 20 mg by mouth daily   Refills:  0        PRILOSEC PO   Dose:  20 mg        Take 20 mg by mouth every morning   Refills:  0        SIMVASTATIN PO        Take by mouth At Bedtime   Refills:  0        TRAZODONE HCL PO        Take by mouth At Bedtime   Refills:  0        TYLENOL PO   Dose:  1300 mg        Take 1,300 mg by mouth   Refills:  0                Prescriptions were sent or printed at these locations (1 Prescription)                   [id number] - [place] - [address] [facility] [address] [address] [facility] [address], [place] [address]    Telephone:  [phone]   Fax:  [phone]   Hours:                  E-Prescribed (1 of 1)         predniSONE (DELTASONE) 20 MG tablet                Procedures and tests performed during your visit     XR Shoulder Left G/E 3 Views      Orders Needing Specimen Collection     None       Pending Results     No orders found from 8/21/2018 to 8/24/2018.            Pending Culture Results     No orders found from 8/21/2018 to 8/24/2018.            Thank you for choosing [facility]       Thank you for choosing [facility] for your care. Our goal is always to provide you with excellent care. Hearing back from our patients is one way we can continue to improve our services. Please take a few minutes to complete the written survey that you may receive in the mail after you visit with us. Thank you!        Care EveryWhere ID     This is your Care EveryWhere ID. This could be used by other organizations to access your [facility] medical records  [id number]        Equal Access to Services     ARNULFO [facility] : Modesto Aparicio, tracy hoang, michael garcia, janet clark. So [facility] [phone].    ATENCIÓN: Si habla español, tiene a walker disposición servicios gratuitos de asistencia lingüística. Llame al [phone].    We comply with applicable federal civil rights laws and [place] laws. We do not discriminate on the basis of race, color, national origin, age, disability, sex, sexual orientation, or gender identity.            After Visit Summary       This is your record. Keep this with you and show to your community pharmacist(s) and doctor(s) at your next visit.

## 2018-08-23 NOTE — ED AVS SNAPSHOT
[facility]    [address] [place] [address]    Phone:  [phone]                                       Niesha Lyle   MRN: [id number]    Department:  [facility]   Date of Visit:  8/23/2018           After Visit Summary Signature Page     I have received my discharge instructions, and my questions have been answered. I have discussed any challenges I see with this plan with the nurse or doctor.    ..........................................................................................................................................  Patient/Patient Representative Signature      ..........................................................................................................................................  Patient Representative Print Name and Relationship to Patient    ..................................................               ................................................  Date                                            Time    ..........................................................................................................................................  Reviewed by Signature/Title    ...................................................              ..............................................  Date                                                            Time

## 2018-09-26 ENCOUNTER — APPOINTMENT (OUTPATIENT)
Dept: GENERAL RADIOLOGY | Facility: HOSPITAL | Age: 58
End: 2018-09-26
Attending: PHYSICIAN ASSISTANT
Payer: COMMERCIAL

## 2018-09-26 ENCOUNTER — HOSPITAL ENCOUNTER (EMERGENCY)
Facility: HOSPITAL | Age: 58
Discharge: HOME | End: 2018-09-26
Attending: PHYSICIAN ASSISTANT | Admitting: PHYSICIAN ASSISTANT
Payer: COMMERCIAL

## 2018-09-26 VITALS
SYSTOLIC BLOOD PRESSURE: 158 MMHG | RESPIRATION RATE: 20 BRPM | TEMPERATURE: 96.7 F | DIASTOLIC BLOOD PRESSURE: 100 MMHG | OXYGEN SATURATION: 94 %
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
## 2018-09-26 DIAGNOSIS — R06.09 DYSPNEA ON EXERTION: ICD-10-CM

## 2018-09-26 DIAGNOSIS — S60.519A CAT SCRATCH OF HAND, INITIAL ENCOUNTER: ICD-10-CM

## 2018-09-26 LAB
ANION GAP SERPL CALCULATED.3IONS-SCNC: 6 MMOL/L (ref 3–14)
BASOPHILS # BLD AUTO: 0.1 10E9/L (ref 0–0.2)
BASOPHILS NFR BLD AUTO: 0.5 %
BUN SERPL-MCNC: 9 MG/DL (ref 7–30)
CALCIUM SERPL-MCNC: 8.5 MG/DL (ref 8.5–10.1)
CHLORIDE SERPL-SCNC: 104 MMOL/L (ref 94–109)
CO2 SERPL-SCNC: 29 MMOL/L (ref 20–32)
CREAT SERPL-MCNC: 0.82 MG/DL (ref 0.52–1.04)
D DIMER PPP DDU-MCNC: 206 NG/ML D-DU (ref 0–300)
DIFFERENTIAL METHOD BLD: NORMAL
EOSINOPHIL # BLD AUTO: 0.3 10E9/L (ref 0–0.7)
EOSINOPHIL NFR BLD AUTO: 2.7 %
ERYTHROCYTE [DISTWIDTH] IN BLOOD BY AUTOMATED COUNT: 11.9 % (ref 10–15)
GFR SERPL CREATININE-BSD FRML MDRD: 71 ML/MIN/1.7M2
GLUCOSE SERPL-MCNC: 111 MG/DL (ref 70–99)
HCT VFR BLD AUTO: 38.1 % (ref 35–47)
HGB BLD-MCNC: 13 G/DL (ref 11.7–15.7)
IMM GRANULOCYTES # BLD: 0.1 10E9/L (ref 0–0.4)
IMM GRANULOCYTES NFR BLD: 0.5 %
LYMPHOCYTES # BLD AUTO: 3.4 10E9/L (ref 0.8–5.3)
LYMPHOCYTES NFR BLD AUTO: 33.5 %
MCH RBC QN AUTO: 29.8 PG (ref 26.5–33)
MCHC RBC AUTO-ENTMCNC: 34.1 G/DL (ref 31.5–36.5)
MCV RBC AUTO: 87 FL (ref 78–100)
MONOCYTES # BLD AUTO: 0.8 10E9/L (ref 0–1.3)
MONOCYTES NFR BLD AUTO: 7.4 %
NEUTROPHILS # BLD AUTO: 5.6 10E9/L (ref 1.6–8.3)
NEUTROPHILS NFR BLD AUTO: 55.4 %
NRBC # BLD AUTO: 0 10*3/UL
NRBC BLD AUTO-RTO: 0 /100
NT-PROBNP SERPL-MCNC: 41 PG/ML (ref 0–900)
PLATELET # BLD AUTO: 295 10E9/L (ref 150–450)
POTASSIUM SERPL-SCNC: 3.4 MMOL/L (ref 3.4–5.3)
RBC # BLD AUTO: 4.36 10E12/L (ref 3.8–5.2)
SODIUM SERPL-SCNC: 139 MMOL/L (ref 133–144)
TROPONIN I SERPL-MCNC: <0.015 UG/L (ref 0–0.04)
WBC # BLD AUTO: 10.1 10E9/L (ref 4–11)

## 2018-09-26 PROCEDURE — 93005 ELECTROCARDIOGRAM TRACING: CPT

## 2018-09-26 PROCEDURE — 83880 ASSAY OF NATRIURETIC PEPTIDE: CPT | Performed by: PHYSICIAN ASSISTANT

## 2018-09-26 PROCEDURE — 93010 ELECTROCARDIOGRAM REPORT: CPT | Performed by: INTERNAL MEDICINE

## 2018-09-26 PROCEDURE — 90715 TDAP VACCINE 7 YRS/> IM: CPT | Performed by: PHYSICIAN ASSISTANT

## 2018-09-26 PROCEDURE — 40000275 ZZH STATISTIC RCP TIME EA 10 MIN

## 2018-09-26 PROCEDURE — 80048 BASIC METABOLIC PNL TOTAL CA: CPT | Performed by: PHYSICIAN ASSISTANT

## 2018-09-26 PROCEDURE — 36415 COLL VENOUS BLD VENIPUNCTURE: CPT | Performed by: PHYSICIAN ASSISTANT

## 2018-09-26 PROCEDURE — 90471 IMMUNIZATION ADMIN: CPT

## 2018-09-26 PROCEDURE — 25000125 ZZHC RX 250: Performed by: PHYSICIAN ASSISTANT

## 2018-09-26 PROCEDURE — 99285 EMERGENCY DEPT VISIT HI MDM: CPT | Mod: 25

## 2018-09-26 PROCEDURE — 84484 ASSAY OF TROPONIN QUANT: CPT | Performed by: PHYSICIAN ASSISTANT

## 2018-09-26 PROCEDURE — 94640 AIRWAY INHALATION TREATMENT: CPT

## 2018-09-26 PROCEDURE — 99284 EMERGENCY DEPT VISIT MOD MDM: CPT | Performed by: PHYSICIAN ASSISTANT

## 2018-09-26 PROCEDURE — 25000128 H RX IP 250 OP 636: Performed by: PHYSICIAN ASSISTANT

## 2018-09-26 PROCEDURE — 85025 COMPLETE CBC W/AUTO DIFF WBC: CPT | Performed by: PHYSICIAN ASSISTANT

## 2018-09-26 PROCEDURE — 85379 FIBRIN DEGRADATION QUANT: CPT | Performed by: PHYSICIAN ASSISTANT

## 2018-09-26 PROCEDURE — 71046 X-RAY EXAM CHEST 2 VIEWS: CPT | Mod: TC

## 2018-09-26 RX ORDER — IPRATROPIUM BROMIDE AND ALBUTEROL SULFATE 2.5; .5 MG/3ML; MG/3ML
3 SOLUTION RESPIRATORY (INHALATION) ONCE
Status: COMPLETED | OUTPATIENT
Start: 2018-09-26 | End: 2018-09-26

## 2018-09-26 RX ORDER — ALBUTEROL SULFATE 90 UG/1
2 AEROSOL, METERED RESPIRATORY (INHALATION) EVERY 4 HOURS PRN
Qty: 1 INHALER | Refills: 0 | Status: SHIPPED | OUTPATIENT
Start: 2018-09-26 | End: 2022-10-08

## 2018-09-26 RX ADMIN — CLOSTRIDIUM TETANI TOXOID ANTIGEN (FORMALDEHYDE INACTIVATED), CORYNEBACTERIUM DIPHTHERIAE TOXOID ANTIGEN (FORMALDEHYDE INACTIVATED), BORDETELLA PERTUSSIS TOXOID ANTIGEN (GLUTARALDEHYDE INACTIVATED), BORDETELLA PERTUSSIS FILAMENTOUS HEMAGGLUTININ ANTIGEN (FORMALDEHYDE INACTIVATED), BORDETELLA PERTUSSIS PERTACTIN ANTIGEN, AND BORDETELLA PERTUSSIS FIMBRIAE 2/3 ANTIGEN 0.5 ML: 5; 2; 2.5; 5; 3; 5 INJECTION, SUSPENSION INTRAMUSCULAR at 20:02

## 2018-09-26 RX ADMIN — IPRATROPIUM BROMIDE AND ALBUTEROL SULFATE 3 ML: .5; 3 SOLUTION RESPIRATORY (INHALATION) at 19:50

## 2018-09-26 ASSESSMENT — ENCOUNTER SYMPTOMS
CHILLS: 0
VOMITING: 0
FATIGUE: 0
COUGH: 0
NAUSEA: 0
FEVER: 0
CHEST TIGHTNESS: 0
PALPITATIONS: 0
ACTIVITY CHANGE: 0
ABDOMINAL PAIN: 0
SHORTNESS OF BREATH: 1

## 2018-09-26 NOTE — ED AVS SNAPSHOT
[facility]    [address] [address]    Phone:  [phone]                                       Niesha Lyle   MRN: [id number]    Department:  [facility]   Date of Visit:  9/26/2018           Patient Information     Date Of Birth          1960        Your diagnoses for this visit were:     Cat scratch of hand, initial encounter     Dyspnea on exertion        You were seen by Vanita Saunders PA-C.      Follow-up Information     Schedule an appointment as soon as possible for a visit with Venus Donovan NP.    Specialty:  Nurse Practitioner    Contact information:    [facility]  [address] [address] [phone]          Follow up with [facility].    Specialty:  EMERGENCY MEDICINE    Why:  If symptoms worsen    Contact information:    [address] [address] [phone]    Additional information:    From [facility]: Take [address]. Turn left at [address]. Turn left at the first stoplight on [address]. At the first stop sign, take a right onto [address]. Take a left into the parking lot and continue through until you reach the North enterance of the building.       From [place]: Take [address]. Take the [address] ramp towards [place]. Turn left onto [address]. Take a slight right onto [address]. Turn left at the first stoplight on [address]. At the first stop sign, take a right onto [address]. Take a left into the parking lot and continue through until you reach the North enterance of the building.       From [place]: Take [address]. Take a right at [address]. At the first stop sign, take a right onto [address]. Take a left into the parking lot and continue through until you reach the North enterance of the building.         Discharge Instructions       I did not find any evidence of  infection from the cat scratch.  There is a disease from cat scratches that are typically rare and involve adolescence.  They usually present with signs of infection and lymphadenopathy.  Please watch the area for evidence of infection.  Monitor for fevers and chills.    As far as your chronic and progressively worsening dyspnea on exertion, I suggest close follow-up in the clinic with pulmonary function testing, and the like.  Your workup in the emergency department was unrevealing for an emergent cause of your symptoms.  He certainly may have underlying lung disease such as an asthma component.  Please return to the emergency department for any worsening symptoms, chest pain, fevers, or other concerns.       Review of your medicines      START taking        Dose / Directions Last dose taken    albuterol 108 (90 Base) MCG/ACT inhaler   Commonly known as:  PROAIR HFA/PROVENTIL HFA/VENTOLIN HFA   Dose:  2 puff   Quantity:  1 Inhaler        Inhale 2 puffs into the lungs every 4 hours as needed for shortness of breath / dyspnea or wheezing   Refills:  0          Our records show that you are taking the medicines listed below. If these are incorrect, please call your family doctor or clinic.        Dose / Directions Last dose taken    ATENOLOL PO   Dose:  25 mg        Take 25 mg by mouth daily   Refills:  0        CALCIUM + D PO   Dose:  1 tablet        Take 1 tablet by mouth daily   Refills:  0        CYMBALTA PO        Take by mouth daily   Refills:  0        FLEXERIL PO   Dose:  10 mg        Take 10 mg by mouth   Refills:  0        fluticasone 50 MCG/ACT spray   Commonly known as:  FLONASE   Dose:  2 spray   Quantity:  1 Package        Spray 2 sprays into both nostrils daily   Refills:  1        IBUPROFEN PO   Dose:  600 mg        Take 600 mg by mouth   Refills:  0        LISINOPRIL PO   Dose:  20 mg        Take 20 mg by mouth daily   Refills:  0        TRAZODONE HCL PO        Take by mouth At Bedtime   Refills:  0  "       TYLENOL PO   Dose:  1300 mg        Take 1,300 mg by mouth   Refills:  0                Prescriptions were sent or printed at these locations (1 Prescription)                   [facility] - [place] - [address]   [address] [address]    Telephone:  [phone]   Fax:  [phone]   Hours:                  E-Prescribed (1 of 1)         albuterol (PROAIR HFA/PROVENTIL HFA/VENTOLIN HFA) 108 (90 Base) MCG/ACT inhaler                Procedures and tests performed during your visit     Basic metabolic panel    CBC with platelets differential    D-Dimer ([facility])    EKG 12-lead, tracing only    Nt probnp inpatient (BNP)    Troponin I    XR Chest 2 Views      Orders Needing Specimen Collection     None      Pending Results     No orders found from 2018 to 2018.            Pending Culture Results     No orders found from 2018 to 2018.            Thank you for choosing [facility]       Thank you for choosing [facility] for your care. Our goal is always to provide you with excellent care. Hearing back from our patients is one way we can continue to improve our services. Please take a few minutes to complete the written survey that you may receive in the mail after you visit with us. Thank you!        [employer] Information     [employer] lets you send messages to your doctor, view your test results, renew your prescriptions, schedule appointments and more. To sign up, go to [email or web address] . Click on \"Log in\" on the left side of the screen, which will take you to the Welcome page. Then click on \"Sign up Now\" on the right side of the page.     You will be asked to enter the access code listed below, as well as some personal information. Please follow the directions to create your username and password.     Your access code is: [email or web address]  Expires: 2018  8:52 PM     Your access code will  in 90 days. If you need help or a new code, please call your [facility] " [facility] or [phone].        Care EveryWhere ID     This is your Care EveryWhere ID. This could be used by other organizations to access your [facility] medical records  [id number]        Equal Access to Services     ARNULFO [facility] : Modesto Aparicio, wamarthada luqadaha, qaybta kaalmada radha, janet clark. So [facility] [phone].    ATENCIÓN: Si habla español, tiene a walker disposición servicios gratuitos de asistencia lingüística. Llame al [phone].    We comply with applicable federal civil rights laws and [place] laws. We do not discriminate on the basis of race, color, national origin, age, disability, sex, sexual orientation, or gender identity.            After Visit Summary       This is your record. Keep this with you and show to your community pharmacist(s) and doctor(s) at your next visit.

## 2018-09-26 NOTE — ED AVS SNAPSHOT
[facility]    [address]    [place] [address]    Phone:  [phone]                                       Niesha Lyle   MRN: [id number]    Department:  [facility]   Date of Visit:  9/26/2018           After Visit Summary Signature Page     I have received my discharge instructions, and my questions have been answered. I have discussed any challenges I see with this plan with the nurse or doctor.    ..........................................................................................................................................  Patient/Patient Representative Signature      ..........................................................................................................................................  Patient Representative Print Name and Relationship to Patient    ..................................................               ................................................  Date                                   Time    ..........................................................................................................................................  Reviewed by Signature/Title    ...................................................              ..............................................  Date                                               Time          22EPIC Rev 08/18

## 2018-09-27 NOTE — ED NOTES
Ambulated to room 11 independently, gown placed, call light in reach.  Scratched by a stray cat between between the pointer and middle finger of left hand around 1645 today.  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  Rates pain at 4, soreness, pain goal is zero.  SOB started this afternoon, denies chest pain.  SOB with activity all her life.  Tdap 2/10/12.

## 2018-09-27 NOTE — ED PROVIDER NOTES
History     Chief Complaint   Patient presents with     cat scratch     lt hand scratch, notes works at the animal shelter     shortness of breath     c/o shortness of breath with activity x 2 days notes worse than usual. notes has had this problem her entire life, denies pain or cough. resps non labored in triage. sats 100%     The history is provided by the patient.     Niesha Lyle is a 58 year old female who presented to the emergency department ambulatory for evaluation of a cat scratch and shortness of breath.  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  Denies any fevers or chills.  Denies any evidence of infection.  Also tells me she has been having worsening shortness of breath with exertion over the last 2 days.  She tells me that she has been having shortness of breath with exertion the vast majority of her life.  Symptoms have worsened progressively over the last week with significant worsening over the last 2 days and having shortness of breath at rest.  Denies any diaphoresis or vomiting with the shortness of breath.  Denies any chest pains or pressures.  Denies any radiation.  Denies any abdominal symptoms.  Denies any cough or recent travel.    Problem List:    There are no active problems to display for this patient.       Past Medical History:    History reviewed. No pertinent past medical history.    Past Surgical History:    History reviewed. No pertinent surgical history.    Family History:    No family history on file.    Social History:  Marital Status:   [2]  Social History   Substance Use Topics     Smoking status: Never Smoker     Smokeless tobacco: Never Used     Alcohol use No        Medications:      albuterol (PROAIR HFA/PROVENTIL HFA/VENTOLIN HFA) 108 (90 Base) MCG/ACT inhaler   ATENOLOL PO   Calcium Carbonate-Vitamin D (CALCIUM + D PO)   DULoxetine HCl (CYMBALTA PO)   LISINOPRIL PO   TRAZODONE HCL PO    Acetaminophen (TYLENOL PO)   Cyclobenzaprine HCl (FLEXERIL PO)   fluticasone (FLONASE) 50 MCG/ACT nasal spray   IBUPROFEN PO         Review of Systems   Constitutional: Negative for activity change, chills, fatigue and fever.   Respiratory: Positive for shortness of breath. Negative for cough and chest tightness.    Cardiovascular: Negative for chest pain and palpitations.   Gastrointestinal: Negative for abdominal pain, nausea and vomiting.   Musculoskeletal:        Left hand cat scratch   Skin: Negative.        Physical Exam   BP: (!) 189/121  Heart Rate: 97  Temp: 97.8  F (36.6  C)  Resp: 16  SpO2: 100 %      Physical Exam   Constitutional: She is oriented to person, place, and time. She appears well-developed and well-nourished. No distress.   Pleasant and talkative   Cardiovascular: Normal rate and regular rhythm.    Pulmonary/Chest: Effort normal and breath sounds normal. No respiratory distress. She has no wheezes.   Musculoskeletal:   Tiny scratch to the left hand located between the second and third digital web dorsally.  There is no evidence of erythema, swelling, or infection.   Neurological: She is alert and oriented to person, place, and time.   Skin: Skin is warm and dry.   Psychiatric: She has a normal mood and affect.   Nursing note and vitals reviewed.      ED Course     ED Course     Procedures  EKG shows a sinus tachycardia at a rate of 102.  Normal NH interval.  Normal QRS duration.  Normal axis.  Normal P wave duration.  There are no concerning ST segments.  No concerning T waves.  No ectopy.  No evidence of ischemia.        Chest x-ray is negative for focal infiltrate, pneumothorax, or widened mediastinum.    Critical Care time:  none               Results for orders placed or performed during the hospital encounter of 09/26/18 (from the past 24 hour(s))   Basic metabolic panel   Result Value Ref Range    Sodium 139 133 - 144 mmol/L    Potassium 3.4 3.4 - 5.3 mmol/L    Chloride 104 94 - 109  mmol/L    Carbon Dioxide 29 20 - 32 mmol/L    Anion Gap 6 3 - 14 mmol/L    Glucose 111 (H) 70 - 99 mg/dL    Urea Nitrogen 9 7 - 30 mg/dL    Creatinine 0.82 0.52 - 1.04 mg/dL    GFR Estimate 71 >60 mL/min/1.7m2    GFR Estimate If Black 85 >60 mL/min/1.7m2    Calcium 8.5 8.5 - 10.1 mg/dL   CBC with platelets differential   Result Value Ref Range    WBC 10.1 4.0 - 11.0 10e9/L    RBC Count 4.36 3.8 - 5.2 10e12/L    Hemoglobin 13.0 11.7 - 15.7 g/dL    Hematocrit 38.1 35.0 - 47.0 %    MCV 87 78 - 100 fl    MCH 29.8 26.5 - 33.0 pg    MCHC 34.1 31.5 - 36.5 g/dL    RDW 11.9 10.0 - 15.0 %    Platelet Count 295 150 - 450 10e9/L    Diff Method Automated Method     % Neutrophils 55.4 %    % Lymphocytes 33.5 %    % Monocytes 7.4 %    % Eosinophils 2.7 %    % Basophils 0.5 %    % Immature Granulocytes 0.5 %    Nucleated RBCs 0 0 /100    Absolute Neutrophil 5.6 1.6 - 8.3 10e9/L    Absolute Lymphocytes 3.4 0.8 - 5.3 10e9/L    Absolute Monocytes 0.8 0.0 - 1.3 10e9/L    Absolute Eosinophils 0.3 0.0 - 0.7 10e9/L    Absolute Basophils 0.1 0.0 - 0.2 10e9/L    Abs Immature Granulocytes 0.1 0 - 0.4 10e9/L    Absolute Nucleated RBC 0.0    D-Dimer ([facility])   Result Value Ref Range    D-Dimer ng/mL 206 0 - 300 ng/ml D-DU   Nt probnp inpatient (BNP)   Result Value Ref Range    N-Terminal Pro BNP Inpatient 41 0 - 900 pg/mL   Troponin I   Result Value Ref Range    Troponin I ES <0.015 0.000 - 0.045 ug/L   XR Chest 2 Views    Narrative    Procedure:XR CHEST 2 VW    Clinical history:Female, 58 years, dyspnea;     Technique: Two views are submitted.    Comparison: 2/24/2011    Findings: The cardiac silhouette is normal. The pulmonary vasculature  is normal.    The lungs are clear. Bony structures are unremarkable. There are mild  degenerative changes seen within the thoracic spine.      Impression    Impression:  1.   No acute abnormality. No evidence of acute or active disease.    DELVIN NGUYEN MD       Medications   ipratropium - albuterol 0.5  mg/2.5 mg/3 mL (DUONEB) neb solution 3 mL (3 mLs Nebulization Given 9/26/18 1950)   Tdap (tetanus-diphtheria-acell pertussis) (ADACEL) injection 0.5 mL (0.5 mLs Intramuscular Given 9/26/18 2002)       Assessments & Plan (with Medical Decision Making)   Workup as above.  I can find no reasonable or compelling medical indication at this patient requires any antibiotics.  The small scratch on her hand it can simply be monitored.  Antibiotic risks far outweigh any perceived benefit.  Tetanus was updated.  As far as her dyspnea, she reports virtually a lifelong history of dyspnea on exertion that has progressively worsened.  Emergency evaluation for her dyspnea shows a normal EKG, normal chest x-ray, negative cardiac workup, negative d-dimer, and negative BNP.  She had good results from the nebulizer.  Likely has underlying asthma variant.  Certainly would benefit from pulmonary function testing.  I am going to send her home with albuterol metered-dose inhaler.  Stressed importance of close clinic follow-up.  Return to the emergency department for any worsening symptoms or other concerns.  Ms. Lyle voiced complete understanding and was happy and agreeable.    (Please note that this note was completed with a voice recognition program.  Every attempt was made to edit the dictations, but inevitably there remain words that are mis-transcribed.)    I have reviewed the nursing notes.    I have reviewed the findings, diagnosis, plan and need for follow up with the patient.       New Prescriptions    ALBUTEROL (PROAIR HFA/PROVENTIL HFA/VENTOLIN HFA) 108 (90 BASE) MCG/ACT INHALER    Inhale 2 puffs into the lungs every 4 hours as needed for shortness of breath / dyspnea or wheezing       Final diagnoses:   Cat scratch of hand, initial encounter   Dyspnea on exertion       9/26/2018   [facility]     Vanita Saunders PA-C  09/26/18 2051

## 2018-09-27 NOTE — DISCHARGE INSTRUCTIONS
I did not find any evidence of infection from the cat scratch.  There is a disease from cat scratches that are typically rare and involve adolescence.  They usually present with signs of infection and lymphadenopathy.  Please watch the area for evidence of infection.  Monitor for fevers and chills.    As far as your chronic and progressively worsening dyspnea on exertion, I suggest close follow-up in the clinic with pulmonary function testing, and the like.  Your workup in the emergency department was unrevealing for an emergent cause of your symptoms.  He certainly may have underlying lung disease such as an asthma component.  Please return to the emergency department for any worsening symptoms, chest pain, fevers, or other concerns.

## 2018-10-17 ENCOUNTER — HOSPITAL ENCOUNTER (OUTPATIENT)
Dept: RESPIRATORY THERAPY | Facility: HOSPITAL | Age: 58
Discharge: HOME | End: 2018-10-17
Attending: NURSE PRACTITIONER | Admitting: NURSE PRACTITIONER
Payer: COMMERCIAL

## 2018-10-17 LAB
COHGB MFR BLD: 1 % (ref 0–2)
HGB BLD-MCNC: 14.2 G/DL (ref 11.7–15.7)

## 2018-10-17 PROCEDURE — 94726 PLETHYSMOGRAPHY LUNG VOLUMES: CPT

## 2018-10-17 PROCEDURE — 94010 BREATHING CAPACITY TEST: CPT

## 2018-10-17 PROCEDURE — 94010 BREATHING CAPACITY TEST: CPT | Mod: 26 | Performed by: INTERNAL MEDICINE

## 2018-10-17 PROCEDURE — 36415 COLL VENOUS BLD VENIPUNCTURE: CPT | Performed by: FAMILY MEDICINE

## 2018-10-17 PROCEDURE — 94729 DIFFUSING CAPACITY: CPT | Mod: 26 | Performed by: INTERNAL MEDICINE

## 2018-10-17 PROCEDURE — 85018 HEMOGLOBIN: CPT | Performed by: FAMILY MEDICINE

## 2018-10-17 PROCEDURE — 94729 DIFFUSING CAPACITY: CPT

## 2018-10-17 PROCEDURE — 82375 ASSAY CARBOXYHB QUANT: CPT | Performed by: FAMILY MEDICINE

## 2018-10-17 PROCEDURE — 94726 PLETHYSMOGRAPHY LUNG VOLUMES: CPT | Mod: 26 | Performed by: INTERNAL MEDICINE

## 2018-10-17 RX ORDER — ALBUTEROL SULFATE 0.83 MG/ML
2.5 SOLUTION RESPIRATORY (INHALATION)
Status: DISCONTINUED | OUTPATIENT
Start: 2018-10-17 | End: 2018-10-18 | Stop reason: HOSPADM

## 2018-10-17 RX ORDER — BUDESONIDE AND FORMOTEROL FUMARATE DIHYDRATE 80; 4.5 UG/1; UG/1
2 AEROSOL RESPIRATORY (INHALATION) 2 TIMES DAILY
COMMUNITY
End: 2022-10-08

## 2018-11-05 DIAGNOSIS — R06.09 EXERTIONAL DYSPNEA: Primary | ICD-10-CM

## 2018-11-08 ENCOUNTER — HOSPITAL ENCOUNTER (OUTPATIENT)
Dept: GENERAL RADIOLOGY | Facility: HOSPITAL | Age: 58
Setting detail: NUCLEAR MEDICINE
End: 2018-11-08
Attending: NURSE PRACTITIONER
Payer: COMMERCIAL

## 2018-11-08 ENCOUNTER — HOSPITAL ENCOUNTER (OUTPATIENT)
Dept: NUCLEAR MEDICINE | Facility: HOSPITAL | Age: 58
Setting detail: NUCLEAR MEDICINE
Discharge: HOME | End: 2018-11-08
Attending: NURSE PRACTITIONER | Admitting: NURSE PRACTITIONER
Payer: COMMERCIAL

## 2018-11-08 ENCOUNTER — HOSPITAL ENCOUNTER (OUTPATIENT)
Dept: NUCLEAR MEDICINE | Facility: HOSPITAL | Age: 58
Setting detail: NUCLEAR MEDICINE
End: 2018-11-08
Attending: NURSE PRACTITIONER
Payer: COMMERCIAL

## 2018-11-08 DIAGNOSIS — R06.09 EXERTIONAL DYSPNEA: ICD-10-CM

## 2018-11-08 PROCEDURE — A9500 TC99M SESTAMIBI: HCPCS | Performed by: RADIOLOGY

## 2018-11-08 PROCEDURE — 34300033 ZZH RX 343: Performed by: RADIOLOGY

## 2018-11-08 PROCEDURE — 93016 CV STRESS TEST SUPVJ ONLY: CPT | Performed by: INTERNAL MEDICINE

## 2018-11-08 PROCEDURE — 78452 HT MUSCLE IMAGE SPECT MULT: CPT | Mod: TC

## 2018-11-08 PROCEDURE — 93018 CV STRESS TEST I&R ONLY: CPT | Performed by: INTERNAL MEDICINE

## 2018-11-08 PROCEDURE — 93017 CV STRESS TEST TRACING ONLY: CPT

## 2018-11-08 PROCEDURE — 25000128 H RX IP 250 OP 636: Performed by: INTERNAL MEDICINE

## 2018-11-08 RX ORDER — REGADENOSON 0.08 MG/ML
0.4 INJECTION, SOLUTION INTRAVENOUS ONCE
Status: COMPLETED | OUTPATIENT
Start: 2018-11-08 | End: 2018-11-08

## 2018-11-08 RX ADMIN — REGADENOSON 0.4 MG: 0.08 INJECTION, SOLUTION INTRAVENOUS at 08:44

## 2018-11-08 RX ADMIN — Medication 30 MILLICURIE: at 08:44

## 2018-11-08 RX ADMIN — Medication 10 MILLICURIE: at 07:00

## 2018-12-10 ENCOUNTER — HOSPITAL ENCOUNTER (EMERGENCY)
Facility: HOSPITAL | Age: 58
Discharge: HOME | End: 2018-12-10
Attending: NURSE PRACTITIONER | Admitting: NURSE PRACTITIONER
Payer: COMMERCIAL

## 2018-12-10 VITALS
HEART RATE: 95 BPM | BODY MASS INDEX: 27.32 KG/M2 | WEIGHT: 170 LBS | SYSTOLIC BLOOD PRESSURE: 180 MMHG | RESPIRATION RATE: 12 BRPM | TEMPERATURE: 96 F | HEIGHT: 66 IN | DIASTOLIC BLOOD PRESSURE: 110 MMHG | OXYGEN SATURATION: 96 %

## 2018-12-10 DIAGNOSIS — S61.451A DOG BITE OF RIGHT HAND, INITIAL ENCOUNTER: ICD-10-CM
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
## 2018-12-10 PROCEDURE — 99213 OFFICE O/P EST LOW 20 MIN: CPT | Performed by: NURSE PRACTITIONER

## 2018-12-10 PROCEDURE — G0463 HOSPITAL OUTPT CLINIC VISIT: HCPCS

## 2018-12-10 PROCEDURE — 25000132 ZZH RX MED GY IP 250 OP 250 PS 637: Performed by: NURSE PRACTITIONER

## 2018-12-10 RX ORDER — IBUPROFEN 800 MG/1
800 TABLET, FILM COATED ORAL ONCE
Status: COMPLETED | OUTPATIENT
Start: 2018-12-10 | End: 2018-12-10

## 2018-12-10 RX ADMIN — IBUPROFEN 800 MG: 800 TABLET ORAL at 10:24

## 2018-12-10 ASSESSMENT — ENCOUNTER SYMPTOMS
WOUND: 1
ARTHRALGIAS: 0
CONSTITUTIONAL NEGATIVE: 1
MYALGIAS: 0

## 2018-12-10 ASSESSMENT — MIFFLIN-ST. JEOR: SCORE: 1357.86

## 2018-12-10 NOTE — ED PROVIDER NOTES
"  History     Chief Complaint   Patient presents with     Dog Bite     Right hand this am. Dog on \"10 day bite hold.\"         Niesha Lyle is a right hand dominate 58 year old female who presents today with a CC of right hand dog bite.  She works at the [employer].  She was caring for a stray dog, reached into the cage to pet him today and he bit her on the right hand.  The injury happened at about 7:50 am today.  She sustained a  Puncture wound to the dorsal surface of the base of the hand between the thumb and the index finger, and another puncture wound on the center palm of the hand.  She has good ROM, no numbness or tingling.  The pain is a burning sensation.  She has not taken anything for pain.  Tdap is up to date as of September 2018.  The dog is on a 10 day hold at the [facility]    Problem List:    There are no active problems to display for this patient.       Past Medical History:    No past medical history on file.    Past Surgical History:    No past surgical history on file.    Family History:    No family history on file.    Social History:  Marital Status:   [2]  Social History     Tobacco Use     Smoking status: Never Smoker     Smokeless tobacco: Never Used   Substance Use Topics     Alcohol use: No     Drug use: No        Medications:      Acetaminophen (TYLENOL PO)   albuterol (PROAIR HFA/PROVENTIL HFA/VENTOLIN HFA) 108 (90 Base) MCG/ACT inhaler   amoxicillin-clavulanate (AUGMENTIN) 875-125 MG tablet   ATENOLOL PO   budesonide-formoterol (SYMBICORT) 80-4.5 MCG/ACT Inhaler   Calcium Carbonate-Vitamin D (CALCIUM + D PO)   Cyclobenzaprine HCl (FLEXERIL PO)   DULoxetine HCl (CYMBALTA PO)   LISINOPRIL PO   TRAZODONE HCL PO   fluticasone (FLONASE) 50 MCG/ACT nasal spray   IBUPROFEN PO         Review of Systems   Constitutional: Negative.    Musculoskeletal: Negative for arthralgias and myalgias.   Skin: Positive for wound.       Physical Exam   BP: (!) 179/114  Pulse: " "95  Temp: 96  F (35.6  C)  Resp: 12  Height: 167.6 cm (5' 6\")  Weight: 77.1 kg (170 lb)  SpO2: 96 %      Physical Exam   Constitutional: She is oriented to person, place, and time. She appears well-developed.   HENT:   Head: Normocephalic and atraumatic.   Cardiovascular: Normal rate.   Musculoskeletal: Normal range of motion.   1 cm puncture wound on the dorsal surface of the proximal right hand with another superficial scratch just distal to that, approximately 1 cm.    4 mm puncture wound center palm right hand.  No active bleeding.     Neurological: She is alert and oriented to person, place, and time.       ED Course        Procedures    Hand was soaked in hibiclens, wounds were cleansed and covered with triple abx ointment and dressings. NO closure required    Assessments & Plan (with Medical Decision Making)     I have reviewed the nursing notes.    I have reviewed the findings, diagnosis, plan and need for follow up with the patient.  ASSESSMENT / PLAN:  (S61.451A,  W54.0XXA) Dog bite of right hand, initial encounter  Comment: puncture wounds x 3, no closure required  Plan:  Keep dressing in place for the next 48 hours, do not soak the wound or swim.     Wash wounds 1-2 times daily with soap and water and cover with antiseptic ointment   Watch for signs and symptoms of infection including redness, swelling, increasing pain, drainage, odor, warmth, fever/chills   Return to PCP, ED or UC if this develops   Take all medications as prescribed   Pets fully vaccinated against rabies are at very low risk of rabies infection but    because human rabies is almost always fatal, any biting pet should be confined for 10 days.     If the animal is healthy at the end of the 10 days, there is no danger of rabies.  If the animal becomes ill or dies in the 10 day period,    contact animal control so the animal can be tested for rabies.     Return to ED/UC if symptoms increase or concerns develop such as those discussed and " "listed on the \"When to go the Emergency Room\" portion of your discharge instructions.     Patient verbally educated and given appropriate education sheets for their diagnoses and all questions answered to the best of my ability.      Current Discharge Medication List          Final diagnoses:   Dog bite of right hand, initial encounter       12/10/2018   [facility]     Tosha Ryan NP  12/11/18 1156    "

## 2018-12-10 NOTE — ED AVS SNAPSHOT
[facility]  [address]  [place] [address]  Phone:  [phone]                                    Niesha Lyle   MRN: [id number]    Department:  [facility]   Date of Visit:  12/10/2018           After Visit Summary Signature Page    I have received my discharge instructions, and my questions have been answered. I have discussed any challenges I see with this plan with the nurse or doctor.    ..........................................................................................................................................  Patient/Patient Representative Signature      ..........................................................................................................................................  Patient Representative Print Name and Relationship to Patient    ..................................................               ................................................  Date                                   Time    ..........................................................................................................................................  Reviewed by Signature/Title    ...................................................              ..............................................  Date                                               Time          22EPIC Rev 08/18

## 2018-12-10 NOTE — ED TRIAGE NOTES
"Pt presents today with c/o dog bite while from a dog at the animal shelter. States she does not think it has had shots, and dog is on a \"10 day bite hold\".   "

## 2019-01-02 ENCOUNTER — TRANSFERRED RECORDS (OUTPATIENT)
Dept: HEALTH INFORMATION MANAGEMENT | Facility: CLINIC | Age: 59
End: 2019-01-02

## 2019-01-02 ENCOUNTER — MEDICAL CORRESPONDENCE (OUTPATIENT)
Dept: HEALTH INFORMATION MANAGEMENT | Facility: CLINIC | Age: 59
End: 2019-01-02

## 2019-02-26 ENCOUNTER — HOSPITAL ENCOUNTER (OUTPATIENT)
Dept: GENERAL RADIOLOGY | Facility: HOSPITAL | Age: 59
Discharge: HOME | End: 2019-02-26
Attending: NURSE PRACTITIONER | Admitting: NURSE PRACTITIONER
Payer: COMMERCIAL

## 2019-02-26 ENCOUNTER — HOSPITAL ENCOUNTER (OUTPATIENT)
Dept: MRI IMAGING | Facility: HOSPITAL | Age: 59
End: 2019-02-26
Attending: NURSE PRACTITIONER
Payer: COMMERCIAL

## 2019-02-26 DIAGNOSIS — M25.512 LEFT SHOULDER PAIN: ICD-10-CM

## 2019-02-26 PROCEDURE — 25000125 ZZHC RX 250: Performed by: RADIOLOGY

## 2019-02-26 PROCEDURE — 23350 INJECTION FOR SHOULDER X-RAY: CPT | Mod: TC

## 2019-02-26 PROCEDURE — 73222 MRI JOINT UPR EXTREM W/DYE: CPT | Mod: TC,LT

## 2019-02-26 PROCEDURE — A9585 GADOBUTROL INJECTION: HCPCS | Performed by: RADIOLOGY

## 2019-02-26 PROCEDURE — 25500064 ZZH RX 255 OP 636: Performed by: RADIOLOGY

## 2019-02-26 RX ORDER — LIDOCAINE HYDROCHLORIDE 10 MG/ML
INJECTION, SOLUTION EPIDURAL; INFILTRATION; INTRACAUDAL; PERINEURAL
Status: DISCONTINUED
Start: 2019-02-26 | End: 2019-02-27 | Stop reason: HOSPADM

## 2019-02-26 RX ORDER — IOPAMIDOL 612 MG/ML
50 INJECTION, SOLUTION INTRAVASCULAR ONCE
Status: COMPLETED | OUTPATIENT
Start: 2019-02-26 | End: 2019-02-26

## 2019-02-26 RX ORDER — GADOBUTROL 604.72 MG/ML
0.1 INJECTION INTRAVENOUS ONCE
Status: COMPLETED | OUTPATIENT
Start: 2019-02-26 | End: 2019-02-26

## 2019-02-26 RX ADMIN — GADOBUTROL 0.1 ML: 604.72 INJECTION INTRAVENOUS at 14:22

## 2019-02-26 RX ADMIN — LIDOCAINE HYDROCHLORIDE 4 ML: 10 INJECTION, SOLUTION EPIDURAL; INFILTRATION; INTRACAUDAL at 14:20

## 2019-02-26 RX ADMIN — IOPAMIDOL 2 ML: 612 INJECTION, SOLUTION INTRAVENOUS at 14:21

## 2019-04-30 ENCOUNTER — OFFICE VISIT (OUTPATIENT)
Dept: CHIROPRACTIC MEDICINE | Facility: OTHER | Age: 59
End: 2019-04-30
Attending: CHIROPRACTOR
Payer: COMMERCIAL

## 2019-04-30 DIAGNOSIS — M99.03 SEGMENTAL AND SOMATIC DYSFUNCTION OF LUMBAR REGION: ICD-10-CM

## 2019-04-30 DIAGNOSIS — M54.2 CERVICALGIA: ICD-10-CM

## 2019-04-30 DIAGNOSIS — M99.02 SEGMENTAL AND SOMATIC DYSFUNCTION OF THORACIC REGION: ICD-10-CM

## 2019-04-30 DIAGNOSIS — M99.01 SEGMENTAL AND SOMATIC DYSFUNCTION OF CERVICAL REGION: Primary | ICD-10-CM

## 2019-04-30 PROCEDURE — 98941 CHIROPRACT MANJ 3-4 REGIONS: CPT | Mod: AT | Performed by: CHIROPRACTOR

## 2019-04-30 NOTE — PROGRESS NOTES
Subjective Finding:    Chief compalint: Patient presents with:  Back Pain  Neck Pain: with vertigo  , Pain Scale: 7/10, Intensity: dull and ache, Duration: 10 days, Radiating: no.    Date of injury:     Activities that the pain restricts:   Home/household/hobbies/social activities: yes.  Work duties: no.  Sleep: no.  Makes symptoms better: rest.  Makes symptoms worse: activity and lumbar flexion.  Have you seen anyone else for the symptoms? no.  Work related: .  Automobile related injury: no.    Objective and Assessment:    Posture Analysis:   High shoulder: .  Head tilt: .  High iliac crest: .  Head carriage: neutral.  Thoracic Kyphosis: neutral.  Lumbar Lordosis: forward.    Lumbar Range of Motion: extension decreased, left lateral flexion decreased and right lateral flexion decreased.  Cervical Range of Motion: .  Thoracic Range of Motion: extension decreased.  Extremity Range of Motion: .    Palpation:   Subocc tightness    Segmental dysfunction pre-treatment and treatment area: T10  Left  L23 .  C1    Assessment post-treatment:  Cervical: .  Thoracic: ROM increased.  Lumbar: ROM increased and pain and tenderness decreased.    Comments: .      Complicating Factors: .    Plan / Procedure:    Treatment plan: PRN.  Instructed patient: stretch as instructed at visit and walk 10 minutes.  Short term goals: increase ROM.  Long term goals: restore normal function.  Prognosis: good.

## 2019-07-01 ENCOUNTER — OFFICE VISIT (OUTPATIENT)
Dept: CHIROPRACTIC MEDICINE | Facility: OTHER | Age: 59
End: 2019-07-01
Attending: CHIROPRACTOR
Payer: COMMERCIAL

## 2019-07-01 DIAGNOSIS — M99.01 SEGMENTAL AND SOMATIC DYSFUNCTION OF CERVICAL REGION: ICD-10-CM

## 2019-07-01 DIAGNOSIS — M99.03 SEGMENTAL AND SOMATIC DYSFUNCTION OF LUMBAR REGION: Primary | ICD-10-CM

## 2019-07-01 DIAGNOSIS — M99.02 SEGMENTAL AND SOMATIC DYSFUNCTION OF THORACIC REGION: ICD-10-CM

## 2019-07-01 DIAGNOSIS — M54.50 ACUTE BILATERAL LOW BACK PAIN WITHOUT SCIATICA: ICD-10-CM

## 2019-07-01 PROCEDURE — 98941 CHIROPRACT MANJ 3-4 REGIONS: CPT | Mod: AT | Performed by: CHIROPRACTOR

## 2019-07-02 NOTE — PROGRESS NOTES
Subjective Finding:    Chief compalint: Patient presents with:  Back Pain  Neck Pain  , Pain Scale: 7/10, Intensity: dull and ache, Duration: 10 days, Radiating: no.    Date of injury:     Activities that the pain restricts:   Home/household/hobbies/social activities: yes.  Work duties: no.  Sleep: no.  Makes symptoms better: rest.  Makes symptoms worse: activity and lumbar flexion.  Have you seen anyone else for the symptoms? no.  Work related: .  Automobile related injury: no.    Objective and Assessment:    Posture Analysis:   High shoulder: .  Head tilt: .  High iliac crest: .  Head carriage: neutral.  Thoracic Kyphosis: neutral.  Lumbar Lordosis: forward.    Lumbar Range of Motion: extension decreased, left lateral flexion decreased and right lateral flexion decreased.  Cervical Range of Motion: .  Thoracic Range of Motion: extension decreased.  Extremity Range of Motion: .    Palpation:   Subocc tightness    Segmental dysfunction pre-treatment and treatment area: T10  Left  L23 .  C1    Assessment post-treatment:  Cervical: .  Thoracic: ROM increased.  Lumbar: ROM increased and pain and tenderness decreased.    Comments: .      Complicating Factors: .    Plan / Procedure:    Treatment plan: PRN.  Instructed patient: stretch as instructed at visit and walk 10 minutes.  Short term goals: increase ROM.  Long term goals: restore normal function.  Prognosis: good.

## 2019-07-08 ENCOUNTER — OFFICE VISIT (OUTPATIENT)
Dept: CHIROPRACTIC MEDICINE | Facility: OTHER | Age: 59
End: 2019-07-08
Attending: CHIROPRACTOR
Payer: COMMERCIAL

## 2019-07-08 DIAGNOSIS — M99.03 SEGMENTAL AND SOMATIC DYSFUNCTION OF LUMBAR REGION: ICD-10-CM

## 2019-07-08 DIAGNOSIS — M54.2 CERVICALGIA: ICD-10-CM

## 2019-07-08 DIAGNOSIS — M99.02 SEGMENTAL AND SOMATIC DYSFUNCTION OF THORACIC REGION: Primary | ICD-10-CM

## 2019-07-08 DIAGNOSIS — M99.01 SEGMENTAL AND SOMATIC DYSFUNCTION OF CERVICAL REGION: ICD-10-CM

## 2019-07-08 PROCEDURE — 98941 CHIROPRACT MANJ 3-4 REGIONS: CPT | Mod: AT | Performed by: CHIROPRACTOR

## 2019-07-15 ENCOUNTER — OFFICE VISIT (OUTPATIENT)
Dept: CHIROPRACTIC MEDICINE | Facility: OTHER | Age: 59
End: 2019-07-15
Attending: CHIROPRACTOR
Payer: COMMERCIAL

## 2019-07-15 DIAGNOSIS — M99.03 SEGMENTAL AND SOMATIC DYSFUNCTION OF LUMBAR REGION: Primary | ICD-10-CM

## 2019-07-15 DIAGNOSIS — M99.01 SEGMENTAL AND SOMATIC DYSFUNCTION OF CERVICAL REGION: ICD-10-CM

## 2019-07-15 DIAGNOSIS — M54.50 ACUTE BILATERAL LOW BACK PAIN WITHOUT SCIATICA: ICD-10-CM

## 2019-07-15 DIAGNOSIS — M99.02 SEGMENTAL AND SOMATIC DYSFUNCTION OF THORACIC REGION: ICD-10-CM

## 2019-07-15 PROCEDURE — 98941 CHIROPRACT MANJ 3-4 REGIONS: CPT | Mod: AT | Performed by: CHIROPRACTOR

## 2019-07-25 ENCOUNTER — APPOINTMENT (OUTPATIENT)
Dept: GENERAL RADIOLOGY | Facility: HOSPITAL | Age: 59
End: 2019-07-25
Attending: FAMILY MEDICINE
Payer: COMMERCIAL

## 2019-07-25 ENCOUNTER — HOSPITAL ENCOUNTER (EMERGENCY)
Facility: HOSPITAL | Age: 59
Discharge: HOME | End: 2019-07-25
Attending: FAMILY MEDICINE | Admitting: FAMILY MEDICINE
Payer: COMMERCIAL

## 2019-07-25 VITALS
RESPIRATION RATE: 16 BRPM | TEMPERATURE: 98.5 F | OXYGEN SATURATION: 97 % | WEIGHT: 175 LBS | SYSTOLIC BLOOD PRESSURE: 157 MMHG | BODY MASS INDEX: 28.12 KG/M2 | HEART RATE: 70 BPM | HEIGHT: 66 IN | DIASTOLIC BLOOD PRESSURE: 93 MMHG

## 2019-07-25 DIAGNOSIS — S53.115A ANTERIOR DISLOCATION OF LEFT ELBOW, INITIAL ENCOUNTER: ICD-10-CM
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
## 2019-07-25 LAB
ALBUMIN SERPL-MCNC: 3.9 G/DL (ref 3.4–5)
ALP SERPL-CCNC: 249 U/L (ref 40–150)
ALT SERPL W P-5'-P-CCNC: 48 U/L (ref 0–50)
ANION GAP SERPL CALCULATED.3IONS-SCNC: 6 MMOL/L (ref 3–14)
AST SERPL W P-5'-P-CCNC: 29 U/L (ref 0–45)
BASOPHILS # BLD AUTO: 0 10E9/L (ref 0–0.2)
BASOPHILS NFR BLD AUTO: 0 %
BILIRUB SERPL-MCNC: 0.3 MG/DL (ref 0.2–1.3)
BUN SERPL-MCNC: 13 MG/DL (ref 7–30)
CALCIUM SERPL-MCNC: 9.3 MG/DL (ref 8.5–10.1)
CHLORIDE SERPL-SCNC: 106 MMOL/L (ref 94–109)
CO2 SERPL-SCNC: 29 MMOL/L (ref 20–32)
CREAT SERPL-MCNC: 0.88 MG/DL (ref 0.52–1.04)
DIFFERENTIAL METHOD BLD: NORMAL
EOSINOPHIL # BLD AUTO: 0.1 10E9/L (ref 0–0.7)
EOSINOPHIL NFR BLD AUTO: 1 %
ERYTHROCYTE [DISTWIDTH] IN BLOOD BY AUTOMATED COUNT: 12 % (ref 10–15)
GFR SERPL CREATININE-BSD FRML MDRD: 71 ML/MIN/{1.73_M2}
GLUCOSE SERPL-MCNC: 149 MG/DL (ref 70–99)
HCT VFR BLD AUTO: 40.6 % (ref 35–47)
HGB BLD-MCNC: 13.6 G/DL (ref 11.7–15.7)
LYMPHOCYTES # BLD AUTO: 3.4 10E9/L (ref 0.8–5.3)
LYMPHOCYTES NFR BLD AUTO: 34 %
MCH RBC QN AUTO: 29.6 PG (ref 26.5–33)
MCHC RBC AUTO-ENTMCNC: 33.5 G/DL (ref 31.5–36.5)
MCV RBC AUTO: 89 FL (ref 78–100)
MONOCYTES # BLD AUTO: 0.3 10E9/L (ref 0–1.3)
MONOCYTES NFR BLD AUTO: 3 %
NEUTROPHILS # BLD AUTO: 6 10E9/L (ref 1.6–8.3)
NEUTROPHILS NFR BLD AUTO: 60 %
NEUTS BAND # BLD AUTO: 0.2 10E9/L (ref 0–0.6)
NEUTS BAND NFR BLD MANUAL: 2 %
PLATELET # BLD AUTO: 306 10E9/L (ref 150–450)
PLATELET # BLD EST: NORMAL 10*3/UL
POTASSIUM SERPL-SCNC: 3.4 MMOL/L (ref 3.4–5.3)
PROT SERPL-MCNC: 7.9 G/DL (ref 6.8–8.8)
RBC # BLD AUTO: 4.59 10E12/L (ref 3.8–5.2)
RBC MORPH BLD: NORMAL
SODIUM SERPL-SCNC: 141 MMOL/L (ref 133–144)
VARIANT LYMPHS BLD QL SMEAR: PRESENT
WBC # BLD AUTO: 10 10E9/L (ref 4–11)

## 2019-07-25 PROCEDURE — 25000125 ZZHC RX 250

## 2019-07-25 PROCEDURE — 24600 TX CLSD ELBOW DISLC W/O ANES: CPT | Mod: 54 | Performed by: FAMILY MEDICINE

## 2019-07-25 PROCEDURE — 25000128 H RX IP 250 OP 636: Performed by: FAMILY MEDICINE

## 2019-07-25 PROCEDURE — 40000986 XR ELBOW PORT LT 2VW: Mod: TC,LT

## 2019-07-25 PROCEDURE — 36415 COLL VENOUS BLD VENIPUNCTURE: CPT | Performed by: FAMILY MEDICINE

## 2019-07-25 PROCEDURE — 73070 X-RAY EXAM OF ELBOW: CPT | Mod: TC,LT

## 2019-07-25 PROCEDURE — 25000128 H RX IP 250 OP 636

## 2019-07-25 PROCEDURE — 99283 EMERGENCY DEPT VISIT LOW MDM: CPT | Mod: 25 | Performed by: FAMILY MEDICINE

## 2019-07-25 PROCEDURE — 24600 TX CLSD ELBOW DISLC W/O ANES: CPT | Mod: LT

## 2019-07-25 PROCEDURE — 99202 OFFICE O/P NEW SF 15 MIN: CPT | Performed by: ORTHOPAEDIC SURGERY

## 2019-07-25 PROCEDURE — 73140 X-RAY EXAM OF FINGER(S): CPT | Mod: TC,LT

## 2019-07-25 PROCEDURE — 99284 EMERGENCY DEPT VISIT MOD MDM: CPT | Mod: 25

## 2019-07-25 PROCEDURE — 40000275 ZZH STATISTIC RCP TIME EA 10 MIN

## 2019-07-25 PROCEDURE — 73100 X-RAY EXAM OF WRIST: CPT | Mod: TC,LT

## 2019-07-25 PROCEDURE — 80053 COMPREHEN METABOLIC PANEL: CPT | Performed by: FAMILY MEDICINE

## 2019-07-25 PROCEDURE — 85025 COMPLETE CBC W/AUTO DIFF WBC: CPT | Performed by: FAMILY MEDICINE

## 2019-07-25 RX ORDER — ONDANSETRON 2 MG/ML
4 INJECTION INTRAMUSCULAR; INTRAVENOUS EVERY 30 MIN PRN
Status: DISCONTINUED | OUTPATIENT
Start: 2019-07-25 | End: 2019-07-25 | Stop reason: HOSPADM

## 2019-07-25 RX ORDER — OXYCODONE HYDROCHLORIDE 10 MG/1
10 TABLET ORAL EVERY 8 HOURS PRN
Qty: 10 TABLET | Refills: 0 | Status: SHIPPED | OUTPATIENT
Start: 2019-07-25 | End: 2020-02-22

## 2019-07-25 RX ORDER — HYDROMORPHONE HYDROCHLORIDE 1 MG/ML
0.5 INJECTION, SOLUTION INTRAMUSCULAR; INTRAVENOUS; SUBCUTANEOUS
Status: COMPLETED | OUTPATIENT
Start: 2019-07-25 | End: 2019-07-25

## 2019-07-25 RX ORDER — TIZANIDINE HYDROCHLORIDE 4 MG/1
4 CAPSULE, GELATIN COATED ORAL 3 TIMES DAILY PRN
COMMUNITY

## 2019-07-25 RX ORDER — AMLODIPINE BESYLATE 10 MG/1
10 TABLET ORAL DAILY
COMMUNITY
End: 2022-10-08

## 2019-07-25 RX ORDER — LORAZEPAM 2 MG/ML
0.5 INJECTION INTRAMUSCULAR ONCE
Status: COMPLETED | OUTPATIENT
Start: 2019-07-25 | End: 2019-07-25

## 2019-07-25 RX ORDER — MONTELUKAST SODIUM 10 MG/1
10 TABLET ORAL AT BEDTIME
COMMUNITY
End: 2022-10-08

## 2019-07-25 RX ORDER — KETOROLAC TROMETHAMINE 15 MG/ML
15 INJECTION, SOLUTION INTRAMUSCULAR; INTRAVENOUS ONCE
Status: COMPLETED | OUTPATIENT
Start: 2019-07-25 | End: 2019-07-25

## 2019-07-25 RX ORDER — KETAMINE HYDROCHLORIDE 10 MG/ML
INJECTION, SOLUTION INTRAMUSCULAR; INTRAVENOUS
Status: COMPLETED
Start: 2019-07-25 | End: 2019-07-25

## 2019-07-25 RX ORDER — LORAZEPAM 2 MG/ML
INJECTION INTRAMUSCULAR
Status: COMPLETED
Start: 2019-07-25 | End: 2019-07-25

## 2019-07-25 RX ADMIN — LORAZEPAM 0.5 MG: 2 INJECTION INTRAMUSCULAR at 07:40

## 2019-07-25 RX ADMIN — HYDROMORPHONE HYDROCHLORIDE 0.5 MG: 1 INJECTION, SOLUTION INTRAMUSCULAR; INTRAVENOUS; SUBCUTANEOUS at 05:17

## 2019-07-25 RX ADMIN — KETAMINE HYDROCHLORIDE 80 MG: 10 INJECTION, SOLUTION INTRAMUSCULAR; INTRAVENOUS at 06:59

## 2019-07-25 RX ADMIN — HYDROMORPHONE HYDROCHLORIDE 0.5 MG: 1 INJECTION, SOLUTION INTRAMUSCULAR; INTRAVENOUS; SUBCUTANEOUS at 09:05

## 2019-07-25 RX ADMIN — HYDROMORPHONE HYDROCHLORIDE 0.5 MG: 1 INJECTION, SOLUTION INTRAMUSCULAR; INTRAVENOUS; SUBCUTANEOUS at 08:13

## 2019-07-25 RX ADMIN — ONDANSETRON 4 MG: 2 INJECTION INTRAMUSCULAR; INTRAVENOUS at 05:15

## 2019-07-25 RX ADMIN — KETAMINE HYDROCHLORIDE 50 MG: 10 INJECTION, SOLUTION INTRAMUSCULAR; INTRAVENOUS at 07:02

## 2019-07-25 RX ADMIN — KETOROLAC TROMETHAMINE 15 MG: 15 INJECTION, SOLUTION INTRAMUSCULAR; INTRAVENOUS at 05:45

## 2019-07-25 RX ADMIN — LORAZEPAM 0.5 MG: 2 INJECTION INTRAMUSCULAR; INTRAVENOUS at 07:40

## 2019-07-25 ASSESSMENT — ENCOUNTER SYMPTOMS
EYE REDNESS: 0
FEVER: 0
ABDOMINAL PAIN: 0
NECK STIFFNESS: 0
HEADACHES: 0
SHORTNESS OF BREATH: 0
COLOR CHANGE: 0
DIFFICULTY URINATING: 0
BACK PAIN: 0
CONFUSION: 0

## 2019-07-25 ASSESSMENT — MIFFLIN-ST. JEOR: SCORE: 1375.54

## 2019-07-25 NOTE — ED NOTES
Ambulated into ED room 1 independently with c/o left elbow/arm pain after tripping over dog in the dark and falling onto left arm. Denies hitting head. C/o pain rated 10/10. Left elbow is deformed. C/o numbness and tingling from shoulder to tips of fingers. Cap refill on left hand is less than 2 seconds. Left hand is pink and warm. Patient is self splinting left arm. Placed a pillow under left arm but patient states pillow hurts too much and is requesting to self splint. Call light placed within reach.

## 2019-07-25 NOTE — ED NOTES
Face to face report given with opportunity to observe patient.    Report given to Mya RN's     Emi Mckinney   7/25/2019  8:10 AM

## 2019-07-25 NOTE — PROGRESS NOTES
Pt. Required concious sedation for reduction of her elbow- started on 2lpm- 97%- increased O2 to 4lpm to maintain adequate Sats- after 2nd dose ok Ketamine pt.required assisted respirations with O2 titrated until she was able to be on a nasal cannula at 2lpm

## 2019-07-25 NOTE — ED AVS SNAPSHOT
[facility]  [address]  [place] [address]  Phone:  [phone]                                    Niesha Lyle   MRN: [id number]    Department:  [facility]   Date of Visit:  7/25/2019           After Visit Summary Signature Page    I have received my discharge instructions, and my questions have been answered. I have discussed any challenges I see with this plan with the nurse or doctor.    ..........................................................................................................................................  Patient/Patient Representative Signature      ..........................................................................................................................................  Patient Representative Print Name and Relationship to Patient    ..................................................               ................................................  Date                                   Time    ..........................................................................................................................................  Reviewed by Signature/Title    ...................................................              ..............................................  Date                                               Time          22EPIC Rev 08/18

## 2019-07-25 NOTE — ED NOTES
MD at bedside, applying splint with RN assist.  Pt was premedicated with 0.5 mg IV dilaudid, with pain 10/10, to the LUE.

## 2019-07-25 NOTE — ED PROVIDER NOTES
"  History     Chief Complaint   Patient presents with     Arm Pain     states tripped over dog in the dark and landed on left arm. C/o left arm pain from shoulder to tips of fingers rated 10/10. States, \"I either dislocated my elbow or something is broken.\" C/o numbness and tingling in left arm from shoulder to tips of fingers.     HPI  Niesha Lyle is a 59 year old woman who was walking in the dark, tripped over her dog and fell forward onto her outstretched arm. She felt a crack in her elbow and thinks she may have broken it and noted it appeared deformed.    Allergies:  No Known Allergies    Problem List:    There are no active problems to display for this patient.       Past Medical History:    No past medical history on file.    Past Surgical History:    No past surgical history on file.    Family History:    No family history on file.    Social History:  Marital Status:   [2]  Social History     Tobacco Use     Smoking status: Never Smoker     Smokeless tobacco: Never Used   Substance Use Topics     Alcohol use: No     Drug use: No        Medications:      amLODIPine (NORVASC) 10 MG tablet   Cyclobenzaprine HCl (FLEXERIL PO)   DULoxetine HCl (CYMBALTA PO)   fluticasone (FLONASE) 50 MCG/ACT nasal spray   fluticasone-vilanterol (BREO ELLIPTA) 200-25 MCG/INH inhaler   montelukast (SINGULAIR) 10 MG tablet   oxyCODONE IR (ROXICODONE) 10 MG tablet   tiZANidine (ZANAFLEX) 4 MG capsule   TRAZODONE HCL PO   Acetaminophen (TYLENOL PO)   albuterol (PROAIR HFA/PROVENTIL HFA/VENTOLIN HFA) 108 (90 Base) MCG/ACT inhaler   ATENOLOL PO   budesonide-formoterol (SYMBICORT) 80-4.5 MCG/ACT Inhaler   Calcium Carbonate-Vitamin D (CALCIUM + D PO)   IBUPROFEN PO   LISINOPRIL PO         Review of Systems   Constitutional: Negative for fever.   HENT: Negative for congestion.    Eyes: Negative for redness.   Respiratory: Negative for shortness of breath.    Cardiovascular: Negative for chest pain.   Gastrointestinal: " "Negative for abdominal pain.   Genitourinary: Negative for difficulty urinating.   Musculoskeletal: Negative for back pain and neck stiffness.   Skin: Negative for color change.   Neurological: Negative for headaches.   Psychiatric/Behavioral: Negative for confusion.       Physical Exam   BP: (!) 195/117  Pulse: 70  Heart Rate: 75  Temp: 96.4  F (35.8  C)  Resp: 20  Height: 167.6 cm (5' 6\")  Weight: 79.4 kg (175 lb)  SpO2: 97 %      Physical Exam    General Appearance: well-developed, well-nourished, alert & oriented, no apparent distress.    HEENT: atraumatic. Pupils equal, round & reactive to light, extraocular movements intact. Bilateral ear canals clear. Nose without rhinorrhea or epistaxis. Clear oropharynx. Moist mucous membranes.    Neck: normal inspection, non-tender, full & painless ROM, supple, no lymphadenopathy or nuchal rigidity. No jugular venous distension.    Cardiovascular: regular rate, rhythm, normal S1 & S2, no murmurs, rubs or gallops.    Respiratory: clear lung sounds with good air entry, no wheezes rales or rhonchi, no acute respiratory distress.    Gastrointestinal: normal inspection, normal bowel sounds, non-tender, no masses or organomegaly.    Extremities: obvious deformity of left elbow, pain prevents further evaluation, patient is having difficulty moving left 5th finger, 2+/4+ pulses bilaterally, other joints are non-tender with normal & painless ROM.    Neurologic: CN II - XII intact, no motor/sensory deficit.    Skin: normal color, no skin rash.    ED Course   0800  Successful reduction of anterior dislocation of left elbow. Pain of left finger continues.    0830  Small fracture of coronoid process and possibly of the lateral epicondyle suspected on post-reduction films. Dr. Evans, [facility] was available for consultation and evaluated the patient at bedside, noting that surgery would likely not be necessary following this reduction given the small fragment. He reviewed " "the patient's x-rays with them at the beside as well, recommending a long posterior arm splint and sling with plan for follow-up in his clinic in 7 - 10 days with early mobility and physical therapy after this initial period of healing. He notes that the patient's previous ulnar nerve transposition is likely contributing to her current left 5th finger numbness in the setting of her acute injury and expects hand/finger numbness temporarily during the initial healing process with good resolution.       Orthopedic injury tx  Date/Time: 7/25/2019 7:27 AM  Performed by: Stewart Pulido MD  Authorized by: Stewart Pulido MD   Consent: Verbal consent obtained. Written consent obtained.  Risks and benefits: risks, benefits and alternatives were discussed  Consent given by: patient  Patient understanding: patient states understanding of the procedure being performed  Patient consent: the patient's understanding of the procedure matches consent given  Procedure consent: procedure consent matches procedure scheduled  Relevant documents: relevant documents present and verified  Imaging studies: imaging studies available  Patient identity confirmed: verbally with patient and arm band  Time out: Immediately prior to procedure a \"time out\" was called to verify the correct patient, procedure, equipment, support staff and site/side marked as required.  Pre-procedure neurovascular assessment: neurovascularly intact  Pre-procedure distal perfusion: normal  Pre-procedure neurological function: normal  Pre-procedure range of motion: reduced    Sedation:  Patient sedated: yes  Sedation type: moderate (conscious) sedation  Sedatives: ketamine  Vitals: Vital signs were monitored during sedation.    Immobilization: sling and splint  Supplies used: Ortho-Glass (Posterior long arm splint placed.)  Post-procedure neurovascular assessment: post-procedure neurovascularly intact  Post-procedure distal perfusion: normal  Post-procedure " neurological function: normal  Post-procedure range of motion: improved  Patient tolerance: Patient tolerated the procedure well with no immediate complications (Patient experienced mild dysphoria while clearing which responded well to treatment with lorazepam 0.5 mg.)              Results for orders placed or performed during the hospital encounter of 07/25/19 (from the past 24 hour(s))   CBC with platelets differential   Result Value Ref Range    WBC 10.0 4.0 - 11.0 10e9/L    RBC Count 4.59 3.8 - 5.2 10e12/L    Hemoglobin 13.6 11.7 - 15.7 g/dL    Hematocrit 40.6 35.0 - 47.0 %    MCV 89 78 - 100 fl    MCH 29.6 26.5 - 33.0 pg    MCHC 33.5 31.5 - 36.5 g/dL    RDW 12.0 10.0 - 15.0 %    Platelet Count 306 150 - 450 10e9/L    Diff Method Manual Differential     % Neutrophils 60.0 %    % Lymphocytes 34.0 %    % Monocytes 3.0 %    % Eosinophils 1.0 %    % Basophils 0.0 %    % Band 2.0 %    Absolute Neutrophil 6.0 1.6 - 8.3 10e9/L    Absolute Lymphocytes 3.4 0.8 - 5.3 10e9/L    Absolute Monocytes 0.3 0.0 - 1.3 10e9/L    Absolute Eosinophils 0.1 0.0 - 0.7 10e9/L    Absolute Basophils 0.0 0.0 - 0.2 10e9/L    Absolute Bands 0.2 0.0 - 0.6 10e9/L    Reactive Lymphs Present     RBC Morphology Consistent with reported results     Platelet Estimate Confirming automated cell count    Comprehensive metabolic panel   Result Value Ref Range    Sodium 141 133 - 144 mmol/L    Potassium 3.4 3.4 - 5.3 mmol/L    Chloride 106 94 - 109 mmol/L    Carbon Dioxide 29 20 - 32 mmol/L    Anion Gap 6 3 - 14 mmol/L    Glucose 149 (H) 70 - 99 mg/dL    Urea Nitrogen 13 7 - 30 mg/dL    Creatinine 0.88 0.52 - 1.04 mg/dL    GFR Estimate 71 >60 mL/min/[1.73_m2]    GFR Estimate If Black 82 >60 mL/min/[1.73_m2]    Calcium 9.3 8.5 - 10.1 mg/dL    Bilirubin Total 0.3 0.2 - 1.3 mg/dL    Albumin 3.9 3.4 - 5.0 g/dL    Protein Total 7.9 6.8 - 8.8 g/dL    Alkaline Phosphatase 249 (H) 40 - 150 U/L    ALT 48 0 - 50 U/L    AST 29 0 - 45 U/L   Elbow XR,  2 views, left     Narrative    PROCEDURE:  XR ELBOW LT 2 VW    HISTORY: Acute left elbow injury    COMPARISON:  None.    TECHNIQUE:  2 views of the left elbow were obtained.    FINDINGS:  There is dislocation of the elbow joint. There appears to  be an impaction fracture of the distal humerus at the site of  impaction of the coronoid process of the ulna. Proximal radius and  ulna appear intact       Impression    IMPRESSION: Elbow dislocation      ALYCIA BERMEO MD   XR Wrist Port Left 2 Views    Narrative    PROCEDURE:  XR WRIST PORTABLE LEFT 2 VIEWS    HISTORY: fell onto left arm    COMPARISON:  None.    TECHNIQUE:  2 views of the left wrist were obtained.    FINDINGS:  No fracture or dislocation is identified. The joint spaces  are preserved.        Impression    IMPRESSION: No acute fracture.      ALYCIA BERMEO MD   XR Elbow Port Left 2 Views    Narrative    XR ELBOW PORT LT 2VW, 7/25/2019 7:27 AM    History: Female, age 59 years; elbow dislocation    Comparison: 2 views left elbow 7/25/2019    Technique: Two views .    FINDINGS: Successful closed reduction with large anterior and  posterior joint effusions. Likely fracture of the coronoid process.  Tiny calcification along the lateral epicondyle may represent a small  fracture fragment or may perhaps related to prior episodes of  epicondylitis.      Impression    IMPRESSION:   1.  Successful closed reduction with suggestion of small fracture  fragments of the coronoid process and possibly of the lateral  epicondyle.    2.  Prominent soft tissue swelling with anterior and posterior joint  effusion.    DELVIN NGUYEN MD   XR Finger Port Left G/E 2 Views    Narrative    PROCEDURE:  XR FINGER PORT LT G/E 2 VW    HISTORY: recent fall, difficulty moving left little finger since fall.    COMPARISON:  None.    TECHNIQUE:  2 views of the left fifth finger were obtained.    FINDINGS:  No fracture or dislocation is identified. The joint spaces  are preserved.        Impression     IMPRESSION: No acute fracture.      ALYCIA BERMEO MD       Medications   ondansetron (ZOFRAN) injection 4 mg (4 mg Intravenous Given 7/25/19 0125)   HYDROmorphone (PF) (DILAUDID) injection 0.5 mg (0.5 mg Intravenous Given 7/25/19 0948)   ketorolac (TORADOL) injection 15 mg (15 mg Intravenous Given 7/25/19 45)   ketamine (KETALAR) 10 MG/ML injection (50 mg  Given 7/25/19 0702)   LORazepam (ATIVAN) injection 0.5 mg (0.5 mg Intravenous Given 7/25/19 8963)       Assessments & Plan (with Medical Decision Making)   The patient is a 59 year old woman who fell sustaining a left elbow anterior dislocation.    1) Elbow dislocation - Patient's pain will be treated with rest, ice, maintenance of the splint placed today, elevation and alternating APAP/ibuprofen with oxycodone as needed for breakthrough pain. Patient will follow-up with Dr. Evans in 7 days for continuing orthopedic care.    The patient verbalizes agreement with this plan as well as to immediately return to the ER with any new/worsening S/Sx.      I have reviewed the nursing notes.    I have reviewed the findings, diagnosis, plan and need for follow up with the patient.       Medication List      Started    oxyCODONE IR 10 MG tablet  Commonly known as:  ROXICODONE  10 mg, Oral, EVERY 8 HOURS PRN            Final diagnoses:   Anterior dislocation of left elbow, initial encounter   Fall on same level from tripping as cause of accidental injury       7/25/2019   [facility]     Stewart Pulido MD  07/25/19 6890

## 2019-07-25 NOTE — ED NOTES
IV dilaudid 0.5 mg given, for left elbow pain 10/10.  Pt also stated she can't move her left pinky, since the fall, but cap refill is less than 2 seconds.  MD notified.   at bedside.

## 2019-07-25 NOTE — ED NOTES
Discharge instructions reviewed with patient and .  Oxycodone script sent with patient.  Pt still with numbness tingling in LUE fingers, MD aware.

## 2019-07-25 NOTE — ED NOTES
Patient transferred to ED room 10 via bed for conscious sedation procedure. RT Vesta, at bedside to monitor airway and breathing during procedure. Informed consent signed by patient and Dr. Pulido. Room prepared for conscious sedation and left elbow reduction.  remains at bedside until just before start of procedure.

## 2019-07-25 NOTE — ED NOTES
1 LPM NC applied, due to SPO2 78% on RA while asleep.  Otherwise, SPO2 97% on RA awake. MD at bedside.

## 2019-07-25 NOTE — ED NOTES
updated that patient's elbow is back in place and we are just waiting for her to wake up more before bringing him into the room. Verbalized understanding.

## 2019-07-25 NOTE — ED NOTES
Conscious sedation initiated at 0659. L elbow reduction initiated at 0700. Additional dose of Ketamine administered at 0702 due to pt restlessness.   0706: RR decreased and respirations were assisted with AMBU bag.   0710: Pt maintaining airway, sats adequate on 3 LPM NC.  0714: pt responding to commands, remains confused at this time, able to squeeze L hand appropriately. CMS intact to LUE. Edema noted to L elbow.   0720: Portable X-ray completed of LUE  0723: Sling applied to LUE. CMS remains intact.   0740: 0.5 mg Ativan administered due to lingering effects of Ketamine.   0747: Pt becoming more clear, following commands appropriately.  brought to bedside.   0749: Pt maintaining adequate O2 sats on RA.

## 2019-07-30 NOTE — CONSULTS
Patient is seen in consultation at the request of Dr. Stewart Larios.  Earlier in the day, the patient had fallen onto her outstretched left arm and sustained a dislocation of the elbow.  The patient was sedated and Dr. Larios has reduce the elbow anatomically.  The patient noted some paresthesias and dysesthesias in the ulnar perfusion of her left hand following the injury, and these persisted after the reduction.  Dr. Larios and also noted some small fragment of bone and was concerned about a avulsion fracture of the coronoid process.  Patient has a history of having had an ulnar nerve transposition several years ago on the same elbow.    Physical exam: The patient is recovering from her sedation but is alert enough to answer questions and participate in the exam.  She does have diminished sensation but can feel joint position and pressure in her left upper extremity in all digits.  The arm is in a well-padded posterior splint.  The radiographs are reviewed, the elbow is well reduced and appears to be symmetric and anatomically aligned.  There is a very small fragment tip of the conoid process at the attachment site of the brachial radialis muscle.    Assessment and plan: The patient has a post injury neuropraxia.  These usually resolve within 4 to 6 weeks following the injury and should be monitored carefully.  If symptoms persist beyond that time, neurology consultation should be obtained to evaluate the health of the nerve.  As far as the elbow dislocation is concerned, generally, these are very stable once reduced and rarely, is recurrent instability and issue.  Typical treatment would be 7 to 10 days in a splint followed by gentle and progressive range of motion.  The patient should be instructed in elevation, removal of the Ace wrap to accommodate swelling, and return promptly should she experience any increase in her symptoms.  Otherwise routine follow-up with orthopedics should be in 7 to 10 days.

## 2019-08-08 ENCOUNTER — HOSPITAL ENCOUNTER (OUTPATIENT)
Dept: CT IMAGING | Facility: HOSPITAL | Age: 59
Discharge: HOME | End: 2019-08-08
Attending: SPECIALIST | Admitting: SPECIALIST
Payer: COMMERCIAL

## 2019-08-08 DIAGNOSIS — S42.402A LEFT ELBOW FRACTURE: ICD-10-CM

## 2019-08-08 DIAGNOSIS — S53.105A DISLOCATION OF LEFT ELBOW: ICD-10-CM

## 2019-08-08 PROCEDURE — 73200 CT UPPER EXTREMITY W/O DYE: CPT | Mod: TC,LT

## 2019-09-19 ENCOUNTER — OFFICE VISIT (OUTPATIENT)
Dept: CHIROPRACTIC MEDICINE | Facility: OTHER | Age: 59
End: 2019-09-19
Attending: CHIROPRACTOR
Payer: COMMERCIAL

## 2019-09-19 DIAGNOSIS — M99.02 SEGMENTAL AND SOMATIC DYSFUNCTION OF THORACIC REGION: ICD-10-CM

## 2019-09-19 DIAGNOSIS — M99.01 SEGMENTAL AND SOMATIC DYSFUNCTION OF CERVICAL REGION: ICD-10-CM

## 2019-09-19 DIAGNOSIS — M99.03 SEGMENTAL AND SOMATIC DYSFUNCTION OF LUMBAR REGION: Primary | ICD-10-CM

## 2019-09-19 DIAGNOSIS — M54.50 ACUTE BILATERAL LOW BACK PAIN WITHOUT SCIATICA: ICD-10-CM

## 2019-09-19 PROCEDURE — 98941 CHIROPRACT MANJ 3-4 REGIONS: CPT | Mod: AT | Performed by: CHIROPRACTOR

## 2020-01-23 ENCOUNTER — OFFICE VISIT (OUTPATIENT)
Dept: CHIROPRACTIC MEDICINE | Facility: OTHER | Age: 60
End: 2020-01-23
Attending: CHIROPRACTOR
Payer: COMMERCIAL

## 2020-01-23 DIAGNOSIS — M99.03 SEGMENTAL AND SOMATIC DYSFUNCTION OF LUMBAR REGION: Primary | ICD-10-CM

## 2020-01-23 DIAGNOSIS — M54.50 ACUTE BILATERAL LOW BACK PAIN WITHOUT SCIATICA: ICD-10-CM

## 2020-01-23 DIAGNOSIS — M99.02 SEGMENTAL AND SOMATIC DYSFUNCTION OF THORACIC REGION: ICD-10-CM

## 2020-01-23 PROCEDURE — 98941 CHIROPRACT MANJ 3-4 REGIONS: CPT | Mod: AT | Performed by: CHIROPRACTOR

## 2020-01-23 NOTE — PROGRESS NOTES
Subjective Finding:    Chief compalint: Patient presents with:  Back Pain  , Pain Scale: 7/10, Intensity: dull and ache, Duration: 10 days, Radiating: no.    Date of injury:     Activities that the pain restricts:   Home/household/hobbies/social activities: yes.  Work duties: no.  Sleep: no.  Makes symptoms better: rest.  Makes symptoms worse: activity and lumbar flexion.  Have you seen anyone else for the symptoms? no.  Work related: .  Automobile related injury: no.    Objective and Assessment:    Posture Analysis:   High shoulder: .  Head tilt: .  High iliac crest: .  Head carriage: neutral.  Thoracic Kyphosis: neutral.  Lumbar Lordosis: forward.    Lumbar Range of Motion: extension decreased, left lateral flexion decreased and right lateral flexion decreased.  Cervical Range of Motion: .  Thoracic Range of Motion: extension decreased.  Extremity Range of Motion: .    Palpation:   Subocc tightness    Segmental dysfunction pre-treatment and treatment area: T10  Left  L23 .  C1    Assessment post-treatment:  Cervical: .  Thoracic: ROM increased.  Lumbar: ROM increased and pain and tenderness decreased.    Comments: .      Complicating Factors: .    Plan / Procedure:    Treatment plan: PRN.  Instructed patient: stretch as instructed at visit and walk 10 minutes.  Short term goals: increase ROM.  Long term goals: restore normal function.  Prognosis: good.

## 2020-02-22 ENCOUNTER — APPOINTMENT (OUTPATIENT)
Dept: GENERAL RADIOLOGY | Facility: HOSPITAL | Age: 60
End: 2020-02-22
Attending: NURSE PRACTITIONER
Payer: COMMERCIAL

## 2020-02-22 ENCOUNTER — HOSPITAL ENCOUNTER (EMERGENCY)
Facility: HOSPITAL | Age: 60
Discharge: HOME | End: 2020-02-22
Attending: NURSE PRACTITIONER | Admitting: NURSE PRACTITIONER
Payer: COMMERCIAL

## 2020-02-22 VITALS
HEART RATE: 67 BPM | OXYGEN SATURATION: 95 % | SYSTOLIC BLOOD PRESSURE: 137 MMHG | DIASTOLIC BLOOD PRESSURE: 84 MMHG | RESPIRATION RATE: 16 BRPM | TEMPERATURE: 97.8 F

## 2020-02-22 DIAGNOSIS — M79.645 PAIN OF FINGER OF LEFT HAND: ICD-10-CM

## 2020-02-22 DIAGNOSIS — M79.89 SWELLING OF FINGER, LEFT: ICD-10-CM

## 2020-02-22 PROCEDURE — 73130 X-RAY EXAM OF HAND: CPT | Mod: TC,LT

## 2020-02-22 PROCEDURE — G0463 HOSPITAL OUTPT CLINIC VISIT: HCPCS

## 2020-02-22 PROCEDURE — 99213 OFFICE O/P EST LOW 20 MIN: CPT | Mod: Z6 | Performed by: NURSE PRACTITIONER

## 2020-02-22 ASSESSMENT — ENCOUNTER SYMPTOMS
FEVER: 0
WHEEZING: 0
CHILLS: 0
ABDOMINAL PAIN: 0
COUGH: 0
DIARRHEA: 0
EYE DISCHARGE: 0
PSYCHIATRIC NEGATIVE: 1
SHORTNESS OF BREATH: 0
EYE PAIN: 0
SORE THROAT: 0
STRIDOR: 0
VOMITING: 0
FATIGUE: 0

## 2020-02-22 NOTE — ED PROVIDER NOTES
"  History     Chief Complaint   Patient presents with     Hand Injury     HPI  Niesha Lyle is a 59 year old female who presents with left pinky and ringer finger pain and swelling times 3 days. She notes that she fell and dislocated her elbow on 7/25/19. She notes that when she fell, she damaged ligaments and tendons in her left forearm. Following with Dr. Turner at . She has since been working with PT and she notes that she was working with large clips 3 days ago. She notes that she was flexing and extending each finger. As she was attempting to close the clip and flex her left pinky, she felt a \"pop.\" Some swelling over the area, but she notes that this has been occurring on and off since her initial injury in 7/2019. Also some erythema, but she notes that this is not new. Erythema has been present since 7/2019. No fevers. Some numbness in the tip of her pinky, but this is also not new. She notes that her numbness has actually improved. She did take ibuprofen once for her symptoms.     Allergies:  No Known Allergies    Problem List:    There are no active problems to display for this patient.       Past Medical History:    History reviewed. No pertinent past medical history.    Past Surgical History:    History reviewed. No pertinent surgical history.    Family History:    No family history on file.    Social History:  Marital Status:   [2]  Social History     Tobacco Use     Smoking status: Never Smoker     Smokeless tobacco: Never Used   Substance Use Topics     Alcohol use: No     Drug use: No        Medications:    amLODIPine (NORVASC) 10 MG tablet  ATENOLOL PO  Calcium Carbonate-Vitamin D (CALCIUM + D PO)  DULoxetine HCl (CYMBALTA PO)  LISINOPRIL PO  montelukast (SINGULAIR) 10 MG tablet  tiZANidine (ZANAFLEX) 4 MG capsule  TRAZODONE HCL PO  Acetaminophen (TYLENOL PO)  albuterol (PROAIR HFA/PROVENTIL HFA/VENTOLIN HFA) 108 (90 Base) MCG/ACT inhaler  budesonide-formoterol (SYMBICORT) 80-4.5 " MCG/ACT Inhaler  fluticasone (FLONASE) 50 MCG/ACT nasal spray  fluticasone-vilanterol (BREO ELLIPTA) 200-25 MCG/INH inhaler  IBUPROFEN PO          Review of Systems   Constitutional: Negative for chills, fatigue and fever.   HENT: Negative for ear pain and sore throat.    Eyes: Negative for pain and discharge.   Respiratory: Negative for cough, shortness of breath, wheezing and stridor.    Gastrointestinal: Negative for abdominal pain, diarrhea and vomiting.   Musculoskeletal:        Positive for left pinky and ring finger swelling and slight erythema. She notes that this is now new. Present since 7/22019 when she initially fell.   Skin: Negative for rash.   Psychiatric/Behavioral: Negative.        Physical Exam   BP: 137/84  Pulse: 67  Heart Rate: 78  Temp: 97.8  F (36.6  C)  Resp: 16  SpO2: 95 %      Physical Exam  Vitals signs and nursing note reviewed.   Constitutional:       Appearance: Normal appearance.   Musculoskeletal:      Comments: LEFT HAND: Skin intact. Slight erythema at the base of her left pinky, but she notes that this is not new. Also some edema in her left pinky, but this is also not new. She is able to flex and extend her pinky and ring finger without difficulty, but she can't adduct and abduct her pinky or ring finger. She notes that this is also not new and this is why she is working with PT. Hand warm. Radial pulse 2+. Some numbness in distal pinky, but this is not new.    Neurological:      Mental Status: She is alert.         ED Course        Results for orders placed or performed during the hospital encounter of 02/22/20 (from the past 24 hour(s))   XR Hand Left G/E 3 Views    Narrative    PROCEDURE:  XR HAND LT G/E 3 VW    HISTORY: pinky and ring fingers.    COMPARISON:  7/25/2019    TECHNIQUE:  7/25/2019.    FINDINGS:  No fracture or dislocation is identified. Mild  osteoarthritic changes are present. No definite erosion is seen. No  foreign body is seen.       Impression    IMPRESSION:  "No acute fracture.      LENNY GARG MD       Medications - No data to display    Assessments & Plan (with Medical Decision Making)     I have reviewed the nursing notes.    I have reviewed the findings, diagnosis, plan and need for follow up with the patient.  (M79.645) Pain of finger of left hand  (M79.89) Swelling of finger, left  Plan: Her XR shows no acute changes. While she felt a \"pop,\" she is still able to flex and extend her left fingers. She is unable to adduct or abduct her fingers, but this is not new. No changes in mobility with recent injury. She has not been able to do this since 7/2019 when she had her initial injury. Some edema and erythema, but this is not new. Present since 7/2019. She will continue ibuprofen as needed and call her orthopedic surgeon on Monday.         New Prescriptions    No medications on file       Final diagnoses:   Pain of finger of left hand   Swelling of finger, left       2/22/2020   [facility]     Angle Pina NP  02/22/20 1728    "

## 2020-02-22 NOTE — ED TRIAGE NOTES
Patient states she had an elbow displaced and then put back in place put hurt some of the tendons in her hand and when she was at PT on Thursday she thinks she worked it to hard or something and the pinky and ring finger are swollen and curling up on her.

## 2020-02-22 NOTE — ED AVS SNAPSHOT
[facility]  [address]  [place] [address]  Phone:  [phone]                                    Niesha Lyle   MRN: [id number]    Department:  [facility]   Date of Visit:  2/22/2020           After Visit Summary Signature Page    I have received my discharge instructions, and my questions have been answered. I have discussed any challenges I see with this plan with the nurse or doctor.    ..........................................................................................................................................  Patient/Patient Representative Signature      ..........................................................................................................................................  Patient Representative Print Name and Relationship to Patient    ..................................................               ................................................  Date                                   Time    ..........................................................................................................................................  Reviewed by Signature/Title    ...................................................              ..............................................  Date                                               Time          22EPIC Rev 08/18

## 2020-02-22 NOTE — ED TRIAGE NOTES
"Pt presents today with c/o Something \"poped' in her left pinky finger during PT. Also pain in left ring finger    "

## 2020-03-02 ENCOUNTER — OFFICE VISIT (OUTPATIENT)
Dept: CHIROPRACTIC MEDICINE | Facility: OTHER | Age: 60
End: 2020-03-02
Attending: CHIROPRACTOR
Payer: COMMERCIAL

## 2020-03-02 DIAGNOSIS — M99.02 SEGMENTAL AND SOMATIC DYSFUNCTION OF THORACIC REGION: ICD-10-CM

## 2020-03-02 DIAGNOSIS — M99.01 SEGMENTAL AND SOMATIC DYSFUNCTION OF CERVICAL REGION: Primary | ICD-10-CM

## 2020-03-02 DIAGNOSIS — M99.03 SEGMENTAL AND SOMATIC DYSFUNCTION OF LUMBAR REGION: ICD-10-CM

## 2020-03-02 DIAGNOSIS — M54.2 CERVICALGIA: ICD-10-CM

## 2020-03-02 PROCEDURE — 98941 CHIROPRACT MANJ 3-4 REGIONS: CPT | Mod: AT | Performed by: CHIROPRACTOR

## 2020-03-17 ENCOUNTER — OFFICE VISIT (OUTPATIENT)
Dept: CHIROPRACTIC MEDICINE | Facility: OTHER | Age: 60
End: 2020-03-17
Attending: CHIROPRACTOR
Payer: COMMERCIAL

## 2020-03-17 DIAGNOSIS — M99.01 SEGMENTAL AND SOMATIC DYSFUNCTION OF CERVICAL REGION: Primary | ICD-10-CM

## 2020-03-17 DIAGNOSIS — M99.02 SEGMENTAL AND SOMATIC DYSFUNCTION OF THORACIC REGION: ICD-10-CM

## 2020-03-17 DIAGNOSIS — M54.2 CERVICALGIA: ICD-10-CM

## 2020-03-17 DIAGNOSIS — M99.03 SEGMENTAL AND SOMATIC DYSFUNCTION OF LUMBAR REGION: ICD-10-CM

## 2020-03-17 PROCEDURE — 98941 CHIROPRACT MANJ 3-4 REGIONS: CPT | Mod: AT | Performed by: CHIROPRACTOR

## 2020-03-20 ENCOUNTER — OFFICE VISIT (OUTPATIENT)
Dept: CHIROPRACTIC MEDICINE | Facility: OTHER | Age: 60
End: 2020-03-20
Attending: CHIROPRACTOR
Payer: COMMERCIAL

## 2020-03-20 DIAGNOSIS — M99.03 SEGMENTAL AND SOMATIC DYSFUNCTION OF LUMBAR REGION: Primary | ICD-10-CM

## 2020-03-20 DIAGNOSIS — M99.02 SEGMENTAL AND SOMATIC DYSFUNCTION OF THORACIC REGION: ICD-10-CM

## 2020-03-20 DIAGNOSIS — M54.50 ACUTE BILATERAL LOW BACK PAIN WITHOUT SCIATICA: ICD-10-CM

## 2020-03-20 DIAGNOSIS — M99.01 SEGMENTAL AND SOMATIC DYSFUNCTION OF CERVICAL REGION: ICD-10-CM

## 2020-03-20 PROCEDURE — 98941 CHIROPRACT MANJ 3-4 REGIONS: CPT | Mod: AT | Performed by: CHIROPRACTOR

## 2020-04-20 ENCOUNTER — TRANSFERRED RECORDS (OUTPATIENT)
Dept: HEALTH INFORMATION MANAGEMENT | Facility: OTHER | Age: 60
End: 2020-04-20

## 2020-05-01 ENCOUNTER — MEDICAL CORRESPONDENCE (OUTPATIENT)
Dept: MRI IMAGING | Facility: HOSPITAL | Age: 60
End: 2020-05-01

## 2020-05-04 ENCOUNTER — HOSPITAL ENCOUNTER (OUTPATIENT)
Dept: MRI IMAGING | Facility: HOSPITAL | Age: 60
Discharge: HOME | End: 2020-05-04
Attending: NURSE PRACTITIONER | Admitting: NURSE PRACTITIONER
Payer: COMMERCIAL

## 2020-05-04 DIAGNOSIS — K83.8 OTHER SPECIFIED DISEASES OF BILIARY TRACT: ICD-10-CM

## 2020-05-04 PROCEDURE — 74181 MRI ABDOMEN W/O CONTRAST: CPT | Mod: TC

## 2020-07-27 ENCOUNTER — OFFICE VISIT (OUTPATIENT)
Dept: CHIROPRACTIC MEDICINE | Facility: OTHER | Age: 60
End: 2020-07-27
Attending: CHIROPRACTOR
Payer: COMMERCIAL

## 2020-07-27 DIAGNOSIS — M99.03 SEGMENTAL AND SOMATIC DYSFUNCTION OF LUMBAR REGION: Primary | ICD-10-CM

## 2020-07-27 DIAGNOSIS — M99.01 SEGMENTAL AND SOMATIC DYSFUNCTION OF CERVICAL REGION: ICD-10-CM

## 2020-07-27 DIAGNOSIS — M99.02 SEGMENTAL AND SOMATIC DYSFUNCTION OF THORACIC REGION: ICD-10-CM

## 2020-07-27 DIAGNOSIS — M54.50 ACUTE BILATERAL LOW BACK PAIN WITHOUT SCIATICA: ICD-10-CM

## 2020-07-27 PROCEDURE — 98941 CHIROPRACT MANJ 3-4 REGIONS: CPT | Mod: AT | Performed by: CHIROPRACTOR

## 2020-07-30 ENCOUNTER — HOSPITAL ENCOUNTER (OUTPATIENT)
Dept: BONE DENSITY | Facility: HOSPITAL | Age: 60
Discharge: HOME | End: 2020-07-30
Attending: NURSE PRACTITIONER | Admitting: NURSE PRACTITIONER
Payer: COMMERCIAL

## 2020-07-30 DIAGNOSIS — Z78.0 MENOPAUSE: ICD-10-CM

## 2020-07-30 PROCEDURE — 77080 DXA BONE DENSITY AXIAL: CPT | Mod: TC

## 2020-12-16 ENCOUNTER — OFFICE VISIT (OUTPATIENT)
Dept: CHIROPRACTIC MEDICINE | Facility: OTHER | Age: 60
End: 2020-12-16
Attending: CHIROPRACTOR
Payer: COMMERCIAL

## 2020-12-16 DIAGNOSIS — M99.01 SEGMENTAL AND SOMATIC DYSFUNCTION OF CERVICAL REGION: ICD-10-CM

## 2020-12-16 DIAGNOSIS — M54.50 ACUTE BILATERAL LOW BACK PAIN WITHOUT SCIATICA: ICD-10-CM

## 2020-12-16 DIAGNOSIS — M99.02 SEGMENTAL AND SOMATIC DYSFUNCTION OF THORACIC REGION: ICD-10-CM

## 2020-12-16 DIAGNOSIS — M99.03 SEGMENTAL AND SOMATIC DYSFUNCTION OF LUMBAR REGION: Primary | ICD-10-CM

## 2020-12-16 PROCEDURE — 98941 CHIROPRACT MANJ 3-4 REGIONS: CPT | Mod: AT | Performed by: CHIROPRACTOR

## 2021-01-10 ENCOUNTER — APPOINTMENT (OUTPATIENT)
Dept: GENERAL RADIOLOGY | Facility: HOSPITAL | Age: 61
End: 2021-01-10
Attending: PHYSICIAN ASSISTANT
Payer: COMMERCIAL

## 2021-01-10 ENCOUNTER — HOSPITAL ENCOUNTER (EMERGENCY)
Facility: HOSPITAL | Age: 61
Discharge: HOME | End: 2021-01-10
Attending: PHYSICIAN ASSISTANT | Admitting: PHYSICIAN ASSISTANT
Payer: COMMERCIAL

## 2021-01-10 VITALS
HEART RATE: 95 BPM | RESPIRATION RATE: 16 BRPM | OXYGEN SATURATION: 98 % | SYSTOLIC BLOOD PRESSURE: 168 MMHG | TEMPERATURE: 96.9 F | DIASTOLIC BLOOD PRESSURE: 105 MMHG

## 2021-01-10 DIAGNOSIS — M25.511 RIGHT SHOULDER PAIN, UNSPECIFIED CHRONICITY: ICD-10-CM

## 2021-01-10 PROCEDURE — 96372 THER/PROPH/DIAG INJ SC/IM: CPT | Performed by: PHYSICIAN ASSISTANT

## 2021-01-10 PROCEDURE — 250N000011 HC RX IP 250 OP 636: Performed by: PHYSICIAN ASSISTANT

## 2021-01-10 PROCEDURE — 99214 OFFICE O/P EST MOD 30 MIN: CPT | Performed by: PHYSICIAN ASSISTANT

## 2021-01-10 PROCEDURE — G0463 HOSPITAL OUTPT CLINIC VISIT: HCPCS | Mod: 25

## 2021-01-10 PROCEDURE — 73030 X-RAY EXAM OF SHOULDER: CPT | Mod: RT

## 2021-01-10 RX ORDER — KETOROLAC TROMETHAMINE 30 MG/ML
60 INJECTION, SOLUTION INTRAMUSCULAR; INTRAVENOUS ONCE
Status: COMPLETED | OUTPATIENT
Start: 2021-01-10 | End: 2021-01-10

## 2021-01-10 RX ADMIN — KETOROLAC TROMETHAMINE 60 MG: 30 INJECTION, SOLUTION INTRAMUSCULAR at 15:07

## 2021-01-10 ASSESSMENT — ENCOUNTER SYMPTOMS
NECK PAIN: 1
CONSTITUTIONAL NEGATIVE: 1
ARTHRALGIAS: 1
WEAKNESS: 1
RESPIRATORY NEGATIVE: 1
NECK STIFFNESS: 1
NUMBNESS: 1
CARDIOVASCULAR NEGATIVE: 1

## 2021-01-10 NOTE — DISCHARGE INSTRUCTIONS
- 1000mg tylenol, then in 4-6 hours 800mg ibuprofen. Max dosage for the tizanidine for 2-3 days.  - Sling/rest for 2-3 days - slowly back to activity as tolerated  - I did not see anything resembling shingles and I cannot completely rule out involvement with the neck.  Back here with persistent numbness, pain/swelling/redness out of proportion

## 2021-01-10 NOTE — ED AVS SNAPSHOT
[facility]  [address]  [place] [address]  Phone: [phone]                                    Niesha Lyle   MRN: [id number]    Department: [facility]   Date of Visit: 1/10/2021           After Visit Summary Signature Page    I have received my discharge instructions, and my questions have been answered. I have discussed any challenges I see with this plan with the nurse or doctor.    ..........................................................................................................................................  Patient/Patient Representative Signature      ..........................................................................................................................................  Patient Representative Print Name and Relationship to Patient    ..................................................               ................................................  Date                                   Time    ..........................................................................................................................................  Reviewed by Signature/Title    ...................................................              ..............................................  Date                                               Time          22EPIC Rev 08/18

## 2021-01-10 NOTE — ED TRIAGE NOTES
Pt hurt right shoulder in April and rehurt it yesterday.  Pt lifted something. Has not had a surgery on it. Has not had any imaging on it

## 2021-01-10 NOTE — ED TRIAGE NOTES
Pt is here with c/o right shoulder pain   Had previously injury and was told needed surgery on never got it   Re-injured right shoulder reaching back to grab an item   Onset yesterday   No imaging has been obtained on right shoulder

## 2021-01-10 NOTE — ED PROVIDER NOTES
History     Chief Complaint   Patient presents with     Shoulder Pain     HPI  Niesha Lyle is a 60 year old female who presents for intermittent RT shoulder pain on/off for 2 years. She states she has had increased persistent pain today after she lifted a 10 lb bag with her right hand, bringing it to chest height (externally rotating). She reports tingling into her wrist if she is in one position too long. Sx increase when she holds the steering wheel with her right arm. She has not noticed any relationship to ROM about the neck. No direct trauma. She has been avoiding use of the arm since onset today.     Allergies:  No Known Allergies    Problem List:    There are no active problems to display for this patient.       Past Medical History:    No past medical history on file.    Past Surgical History:    No past surgical history on file.    Family History:    No family history on file.    Social History:  Marital Status:   [2]  Social History     Tobacco Use     Smoking status: Never Smoker     Smokeless tobacco: Never Used   Substance Use Topics     Alcohol use: No     Drug use: No        Medications:         Acetaminophen (TYLENOL PO)       albuterol (PROAIR HFA/PROVENTIL HFA/VENTOLIN HFA) 108 (90 Base) MCG/ACT inhaler       amLODIPine (NORVASC) 10 MG tablet       ATENOLOL PO       budesonide-formoterol (SYMBICORT) 80-4.5 MCG/ACT Inhaler       Calcium Carbonate-Vitamin D (CALCIUM + D PO)       DULoxetine HCl (CYMBALTA PO)       fluticasone (FLONASE) 50 MCG/ACT nasal spray       fluticasone-vilanterol (BREO ELLIPTA) 200-25 MCG/INH inhaler       IBUPROFEN PO       LISINOPRIL PO       montelukast (SINGULAIR) 10 MG tablet       tiZANidine (ZANAFLEX) 4 MG capsule       TRAZODONE HCL PO          Review of Systems   Constitutional: Negative.    Respiratory: Negative.    Cardiovascular: Negative.    Musculoskeletal: Positive for arthralgias, neck pain and neck stiffness.   Neurological: Positive for  "weakness (RT  ) and numbness (tingling if held in external rotation or sustained position at chest height).       Physical Exam   BP: (!) 168/105  Pulse: 95  Temp: 96.9  F (36.1  C)  Resp: 16  SpO2: 98 %      Physical Exam  Vitals signs and nursing note reviewed.   Constitutional:       General: She is not in acute distress.     Appearance: She is not toxic-appearing.   Eyes:      Conjunctiva/sclera: Conjunctivae normal.   Neck:      Musculoskeletal: Neck rigidity and muscular tenderness present. No pain with movement or spinous process tenderness.     Cardiovascular:      Rate and Rhythm: Normal rate.   Pulmonary:      Effort: Pulmonary effort is normal.   Musculoskeletal:      Comments: RT shoulder with NO gross deformity or skin lesions. Tender over GH & AC into the proximal bicep.NEGATIVE \"ahsan\".  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 Patient can  touch opposite shoulder. Apley scratch to beltline area. Lift off testing not attempted. Empty can POSITVE.Patient can wiggle fingers. Sensation to light touch intact at fingertips. Cap refill at fingertips 2 seconds.      strength 5/5 compared to LT, but subjectively, pt feels like she is weaker on the RT.      Neurological:      Mental Status: She is alert and oriented to person, place, and time.   Psychiatric:         Mood and Affect: Mood normal.         ED Course        Procedures  XR Shoulder Right G/E 3 Views   Final Result   IMPRESSION: Normal right shoulder        ALYCIA BERMEO MD             Results for orders placed or performed during the hospital encounter of 01/10/21 (from the past 24 hour(s))   XR Shoulder Right G/E 3 Views    Narrative    PROCEDURE:  XR SHOULDER RT G/E 3 VW    HISTORY: RT shoulder pain, Hx prior trauma, flare up after lifting  10lb today    COMPARISON:  None.    TECHNIQUE:  4 views of the shoulder were obtained.    FINDINGS:  No fracture or dislocation is identified. The joint " spaces  are preserved.        Impression    IMPRESSION: Normal right shoulder      ALYCIA BERMEO MD       Medications   ketorolac (TORADOL) injection 60 mg (60 mg Intramuscular Given 1/10/21 6483)       Assessments & Plan (with Medical Decision Making)     I have reviewed the nursing notes.    I have reviewed the findings, diagnosis, plan and need for follow up with the patient.    New Prescriptions    No medications on file       Final diagnoses:   Right shoulder pain, unspecified chronicity   XR negative for Fx/dislocation. Niesha already has a sling from prior LT shoulder injury that she will use for the next few days. She will rotate ibuprofen and tylenol & may use Zanaflex (has Rx at home) over the next 72 hrs. Slowly get back to activity as tolerated, backing off with pain/worsening. She hopes to get into her PCP Tuesday. Will return here with worsening despite Tx. She is agreeable to plan and discharged home stable.     1/10/2021   [facility]     Rudy Malloy PA  01/10/21 1540       Rudy Malloy PA  01/10/21 2550

## 2021-01-13 ENCOUNTER — MEDICAL CORRESPONDENCE (OUTPATIENT)
Dept: MRI IMAGING | Facility: HOSPITAL | Age: 61
End: 2021-01-13

## 2021-06-09 ENCOUNTER — TRANSFERRED RECORDS (OUTPATIENT)
Dept: HEALTH INFORMATION MANAGEMENT | Facility: OTHER | Age: 61
End: 2021-06-09

## 2021-11-30 ENCOUNTER — MEDICAL CORRESPONDENCE (OUTPATIENT)
Dept: MRI IMAGING | Facility: HOSPITAL | Age: 61
End: 2021-11-30
Payer: COMMERCIAL

## 2022-01-31 ENCOUNTER — OFFICE VISIT (OUTPATIENT)
Dept: CHIROPRACTIC MEDICINE | Facility: OTHER | Age: 62
End: 2022-01-31
Attending: CHIROPRACTOR
Payer: COMMERCIAL

## 2022-01-31 DIAGNOSIS — M99.02 SEGMENTAL AND SOMATIC DYSFUNCTION OF THORACIC REGION: ICD-10-CM

## 2022-01-31 DIAGNOSIS — M54.50 ACUTE BILATERAL LOW BACK PAIN WITHOUT SCIATICA: ICD-10-CM

## 2022-01-31 DIAGNOSIS — M99.01 SEGMENTAL AND SOMATIC DYSFUNCTION OF CERVICAL REGION: ICD-10-CM

## 2022-01-31 DIAGNOSIS — M99.03 SEGMENTAL AND SOMATIC DYSFUNCTION OF LUMBAR REGION: Primary | ICD-10-CM

## 2022-01-31 PROCEDURE — 98941 CHIROPRACT MANJ 3-4 REGIONS: CPT | Mod: AT | Performed by: CHIROPRACTOR

## 2022-02-08 ENCOUNTER — OFFICE VISIT (OUTPATIENT)
Dept: CHIROPRACTIC MEDICINE | Facility: OTHER | Age: 62
End: 2022-02-08
Attending: CHIROPRACTOR
Payer: COMMERCIAL

## 2022-02-08 DIAGNOSIS — M99.03 SEGMENTAL AND SOMATIC DYSFUNCTION OF LUMBAR REGION: Primary | ICD-10-CM

## 2022-02-08 DIAGNOSIS — M99.02 SEGMENTAL AND SOMATIC DYSFUNCTION OF THORACIC REGION: ICD-10-CM

## 2022-02-08 DIAGNOSIS — M99.01 SEGMENTAL AND SOMATIC DYSFUNCTION OF CERVICAL REGION: ICD-10-CM

## 2022-02-08 DIAGNOSIS — M54.50 ACUTE BILATERAL LOW BACK PAIN WITHOUT SCIATICA: ICD-10-CM

## 2022-02-08 PROCEDURE — 98941 CHIROPRACT MANJ 3-4 REGIONS: CPT | Mod: AT | Performed by: CHIROPRACTOR

## 2022-02-14 NOTE — PROGRESS NOTES
Subjective Finding:    Chief compalint: Patient presents with:  Neck Pain  Back Pain  , Pain Scale: 7/10, Intensity: dull and ache, Duration: 10 days, Radiating: no.    Date of injury:     Activities that the pain restricts:   Home/household/hobbies/social activities: yes.  Work duties: no.  Sleep: no.  Makes symptoms better: rest.  Makes symptoms worse: activity and lumbar flexion.  Have you seen anyone else for the symptoms? no.  Work related: .  Automobile related injury: no.    Objective and Assessment:    Posture Analysis:   High shoulder: .  Head tilt: .  High iliac crest: .  Head carriage: neutral.  Thoracic Kyphosis: neutral.  Lumbar Lordosis: forward.    Lumbar Range of Motion: extension decreased, left lateral flexion decreased and right lateral flexion decreased.  Cervical Range of Motion: .  Thoracic Range of Motion: extension decreased.  Extremity Range of Motion: .    Palpation:   Subocc tightness    Segmental dysfunction pre-treatment and treatment area: T10  Left  L23 .  C1    Assessment post-treatment:  Cervical: .  Thoracic: ROM increased.  Lumbar: ROM increased and pain and tenderness decreased.    Comments: .      Complicating Factors: .    Plan / Procedure:    Treatment plan: PRN.  Instructed patient: stretch as instructed at visit and walk 10 minutes.  Short term goals: increase ROM.  Long term goals: restore normal function.  Prognosis: good.

## 2022-03-09 ENCOUNTER — TRANSFERRED RECORDS (OUTPATIENT)
Dept: HEALTH INFORMATION MANAGEMENT | Facility: OTHER | Age: 62
End: 2022-03-09

## 2022-10-08 ENCOUNTER — HOSPITAL ENCOUNTER (EMERGENCY)
Facility: HOSPITAL | Age: 62
Discharge: HOME | End: 2022-10-08
Attending: EMERGENCY MEDICINE | Admitting: EMERGENCY MEDICINE
Payer: COMMERCIAL

## 2022-10-08 ENCOUNTER — APPOINTMENT (OUTPATIENT)
Dept: CT IMAGING | Facility: HOSPITAL | Age: 62
End: 2022-10-08
Attending: EMERGENCY MEDICINE
Payer: COMMERCIAL

## 2022-10-08 VITALS
BODY MASS INDEX: 27.12 KG/M2 | HEART RATE: 71 BPM | TEMPERATURE: 98.4 F | WEIGHT: 168 LBS | OXYGEN SATURATION: 97 % | RESPIRATION RATE: 16 BRPM | SYSTOLIC BLOOD PRESSURE: 182 MMHG | DIASTOLIC BLOOD PRESSURE: 100 MMHG

## 2022-10-08 DIAGNOSIS — J18.9 PNEUMONIA OF BOTH LOWER LOBES DUE TO INFECTIOUS ORGANISM: ICD-10-CM

## 2022-10-08 LAB
ALBUMIN SERPL-MCNC: 3.5 G/DL (ref 3.4–5)
ALP SERPL-CCNC: 137 U/L (ref 40–150)
ALT SERPL W P-5'-P-CCNC: 30 U/L (ref 0–50)
ANION GAP SERPL CALCULATED.3IONS-SCNC: 6 MMOL/L (ref 3–14)
AST SERPL W P-5'-P-CCNC: 17 U/L (ref 0–45)
BASOPHILS # BLD AUTO: 0 10E3/UL (ref 0–0.2)
BASOPHILS NFR BLD AUTO: 1 %
BILIRUB SERPL-MCNC: 0.4 MG/DL (ref 0.2–1.3)
BUN SERPL-MCNC: 12 MG/DL (ref 7–30)
CALCIUM SERPL-MCNC: 9.1 MG/DL (ref 8.5–10.1)
CHLORIDE BLD-SCNC: 108 MMOL/L (ref 94–109)
CO2 SERPL-SCNC: 30 MMOL/L (ref 20–32)
CREAT SERPL-MCNC: 0.81 MG/DL (ref 0.52–1.04)
EOSINOPHIL # BLD AUTO: 0.2 10E3/UL (ref 0–0.7)
EOSINOPHIL NFR BLD AUTO: 3 %
ERYTHROCYTE [DISTWIDTH] IN BLOOD BY AUTOMATED COUNT: 12.9 % (ref 10–15)
GFR SERPL CREATININE-BSD FRML MDRD: 81 ML/MIN/1.73M2
GLUCOSE BLD-MCNC: 114 MG/DL (ref 70–99)
HCT VFR BLD AUTO: 37.7 % (ref 35–47)
HGB BLD-MCNC: 12.6 G/DL (ref 11.7–15.7)
HOLD SPECIMEN: NORMAL
IMM GRANULOCYTES # BLD: 0 10E3/UL
IMM GRANULOCYTES NFR BLD: 0 %
INR PPP: 0.97 (ref 0.85–1.15)
LYMPHOCYTES # BLD AUTO: 1.9 10E3/UL (ref 0.8–5.3)
LYMPHOCYTES NFR BLD AUTO: 31 %
MCH RBC QN AUTO: 29.5 PG (ref 26.5–33)
MCHC RBC AUTO-ENTMCNC: 33.4 G/DL (ref 31.5–36.5)
MCV RBC AUTO: 88 FL (ref 78–100)
MONOCYTES # BLD AUTO: 0.5 10E3/UL (ref 0–1.3)
MONOCYTES NFR BLD AUTO: 8 %
NEUTROPHILS # BLD AUTO: 3.6 10E3/UL (ref 1.6–8.3)
NEUTROPHILS NFR BLD AUTO: 57 %
NRBC # BLD AUTO: 0 10E3/UL
NRBC BLD AUTO-RTO: 0 /100
NT-PROBNP SERPL-MCNC: 93 PG/ML (ref 0–900)
PLATELET # BLD AUTO: 262 10E3/UL (ref 150–450)
POTASSIUM BLD-SCNC: 3.1 MMOL/L (ref 3.4–5.3)
PROT SERPL-MCNC: 7 G/DL (ref 6.8–8.8)
RBC # BLD AUTO: 4.27 10E6/UL (ref 3.8–5.2)
SODIUM SERPL-SCNC: 144 MMOL/L (ref 133–144)
T4 FREE SERPL-MCNC: 1.03 NG/DL (ref 0.76–1.46)
TROPONIN I SERPL HS-MCNC: 6 NG/L
TSH SERPL DL<=0.005 MIU/L-ACNC: 4.13 MU/L (ref 0.4–4)
WBC # BLD AUTO: 6.2 10E3/UL (ref 4–11)

## 2022-10-08 PROCEDURE — 250N000011 HC RX IP 250 OP 636: Performed by: EMERGENCY MEDICINE

## 2022-10-08 PROCEDURE — 99285 EMERGENCY DEPT VISIT HI MDM: CPT | Mod: 25

## 2022-10-08 PROCEDURE — 85610 PROTHROMBIN TIME: CPT | Performed by: EMERGENCY MEDICINE

## 2022-10-08 PROCEDURE — 80053 COMPREHEN METABOLIC PANEL: CPT | Performed by: EMERGENCY MEDICINE

## 2022-10-08 PROCEDURE — 82040 ASSAY OF SERUM ALBUMIN: CPT | Performed by: EMERGENCY MEDICINE

## 2022-10-08 PROCEDURE — 84439 ASSAY OF FREE THYROXINE: CPT | Performed by: EMERGENCY MEDICINE

## 2022-10-08 PROCEDURE — 84443 ASSAY THYROID STIM HORMONE: CPT | Performed by: EMERGENCY MEDICINE

## 2022-10-08 PROCEDURE — 250N000013 HC RX MED GY IP 250 OP 250 PS 637: Performed by: EMERGENCY MEDICINE

## 2022-10-08 PROCEDURE — 85025 COMPLETE CBC W/AUTO DIFF WBC: CPT | Performed by: EMERGENCY MEDICINE

## 2022-10-08 PROCEDURE — 84484 ASSAY OF TROPONIN QUANT: CPT | Performed by: EMERGENCY MEDICINE

## 2022-10-08 PROCEDURE — 93005 ELECTROCARDIOGRAM TRACING: CPT

## 2022-10-08 PROCEDURE — 71275 CT ANGIOGRAPHY CHEST: CPT

## 2022-10-08 PROCEDURE — 83880 ASSAY OF NATRIURETIC PEPTIDE: CPT | Performed by: EMERGENCY MEDICINE

## 2022-10-08 PROCEDURE — 36415 COLL VENOUS BLD VENIPUNCTURE: CPT | Performed by: EMERGENCY MEDICINE

## 2022-10-08 PROCEDURE — 99284 EMERGENCY DEPT VISIT MOD MDM: CPT | Performed by: EMERGENCY MEDICINE

## 2022-10-08 PROCEDURE — 96365 THER/PROPH/DIAG IV INF INIT: CPT | Mod: XU

## 2022-10-08 RX ORDER — OMEPRAZOLE 40 MG/1
40 CAPSULE, DELAYED RELEASE ORAL 2 TIMES DAILY
COMMUNITY
Start: 2022-08-23

## 2022-10-08 RX ORDER — ATORVASTATIN CALCIUM 80 MG/1
80 TABLET, FILM COATED ORAL EVERY EVENING
COMMUNITY
Start: 2022-09-04 | End: 2023-05-08

## 2022-10-08 RX ORDER — IOPAMIDOL 755 MG/ML
50 INJECTION, SOLUTION INTRAVASCULAR ONCE
Status: COMPLETED | OUTPATIENT
Start: 2022-10-08 | End: 2022-10-08

## 2022-10-08 RX ORDER — AZITHROMYCIN 250 MG/1
TABLET, FILM COATED ORAL
Qty: 6 TABLET | Refills: 0 | Status: SHIPPED | OUTPATIENT
Start: 2022-10-08 | End: 2022-10-13

## 2022-10-08 RX ORDER — CEFTRIAXONE SODIUM 1 G/50ML
1 INJECTION, SOLUTION INTRAVENOUS ONCE
Status: COMPLETED | OUTPATIENT
Start: 2022-10-08 | End: 2022-10-08

## 2022-10-08 RX ORDER — ATENOLOL 50 MG/1
50 TABLET ORAL ONCE
Status: COMPLETED | OUTPATIENT
Start: 2022-10-08 | End: 2022-10-08

## 2022-10-08 RX ORDER — ALBUTEROL SULFATE 90 UG/1
2 AEROSOL, METERED RESPIRATORY (INHALATION) EVERY 6 HOURS PRN
Qty: 18 G | Refills: 0 | Status: SHIPPED | OUTPATIENT
Start: 2022-10-08

## 2022-10-08 RX ORDER — POTASSIUM CHLORIDE 1500 MG/1
40 TABLET, EXTENDED RELEASE ORAL ONCE
Status: COMPLETED | OUTPATIENT
Start: 2022-10-08 | End: 2022-10-08

## 2022-10-08 RX ADMIN — IOPAMIDOL 50 ML: 755 INJECTION, SOLUTION INTRAVENOUS at 08:47

## 2022-10-08 RX ADMIN — POTASSIUM CHLORIDE 40 MEQ: 1500 TABLET, EXTENDED RELEASE ORAL at 09:03

## 2022-10-08 RX ADMIN — CEFTRIAXONE SODIUM 1 G: 1 INJECTION, SOLUTION INTRAVENOUS at 10:52

## 2022-10-08 RX ADMIN — ATENOLOL 50 MG: 50 TABLET ORAL at 08:15

## 2022-10-08 ASSESSMENT — ENCOUNTER SYMPTOMS
PALPITATIONS: 1
GASTROINTESTINAL NEGATIVE: 1
ENDOCRINE NEGATIVE: 1
EYES NEGATIVE: 1
CONSTITUTIONAL NEGATIVE: 1
MUSCULOSKELETAL NEGATIVE: 1
LIGHT-HEADEDNESS: 1
SHORTNESS OF BREATH: 1

## 2022-10-08 ASSESSMENT — ACTIVITIES OF DAILY LIVING (ADL)
ADLS_ACUITY_SCORE: 35
ADLS_ACUITY_SCORE: 35

## 2022-10-08 NOTE — ED TRIAGE NOTES
"Pt presents with c/o \"feeling shaky and out of breath\" Pt's bp has been elevated and she's been tachycardic.      "

## 2022-10-08 NOTE — ED PROVIDER NOTES
"  History     Chief Complaint   Patient presents with     Tachycardia     HPI  Niesha Lyle is a 62 year old female who comes to the emergency department complaining of feeling \"shaky and out of breath\".  Patient states that he has had episodes of \"tachycardia\" since this past Wednesday.  She also states that she has felt mildly short of breath.  She states that earlier today she felt slightly lightheaded as well.  She states that she does experience \"palpitations\".  She currently denies headache or visual disturbance.  She has not been having pain in her chest.  She has not had nausea or vomiting.  She states she has had no recent change in her bowel or bladder habits.  She has not had fevers chills or cough.  She is currently on atenolol and states that she did not take it this morning.  The patient does admit to having a mild cough    Allergies:  Allergies   Allergen Reactions     Lisinopril        Problem List:    Hypertension, hyperlipidemia, lumbar facet arthropathy, migraine headaches, Raynaud's disease, restless leg syndrome,    Past Medical History:    No past medical history on file.    Past Surgical History:    No past surgical history on file.    Family History:    No family history on file.    Social History:  Marital Status:   [2]  Social History     Tobacco Use     Smoking status: Never Smoker     Smokeless tobacco: Never Used   Substance Use Topics     Alcohol use: No     Drug use: No        Medications:    albuterol (PROAIR HFA/PROVENTIL HFA/VENTOLIN HFA) 108 (90 Base) MCG/ACT inhaler  ATENOLOL PO  atorvastatin (LIPITOR) 80 MG tablet  azithromycin (ZITHROMAX Z-ELAYNE) 250 MG tablet  Calcium Carbonate-Vitamin D (CALCIUM + D PO)  DULoxetine HCl (CYMBALTA PO)  fluticasone-vilanterol (BREO ELLIPTA) 200-25 MCG/INH inhaler  IBUPROFEN PO  omeprazole (PRILOSEC) 40 MG DR capsule  tiZANidine (ZANAFLEX) 4 MG capsule  TRAZODONE HCL PO          Review of Systems   Constitutional: Negative.    HENT: " "Negative.    Eyes: Negative.    Respiratory: Positive for shortness of breath.    Cardiovascular: Positive for palpitations.   Gastrointestinal: Negative.    Endocrine: Negative.    Genitourinary: Negative.    Musculoskeletal: Negative.    Skin: Negative.    Neurological: Positive for light-headedness.   All other systems reviewed and they are found unremarkable    Physical Exam   BP: (!) 163/103  Pulse: 103  Temp: 97.7  F (36.5  C)  Resp: 16  Weight: 76.2 kg (168 lb)  SpO2: 96 %      Physical Exam 62-year-old female who is awake alert oriented person place and time.  Very pleasant and cooperative with my exam.  HEENT normocephalic extraocular muscles intact pupils equally round and reactive light and oropharynx is clear.  Neck is supple is full range of motion without pain.  No JVD.  Lungs are clear bilaterally.  Heart maintains a regular rate and rhythm.  S1 and S2 sounds are appreciated.  No murmur.  Abdomen is soft and nontender.  Extremities a full range of motion 5/5 strength.  No edema.  Brisk capillary refill.  Neurologic exam no focal deficit.  Dermatologic exam no diffuse skin rashes or lesions noted.    ED Course              ED Course as of 10/08/22 1113   Sat Oct 08, 2022   1045 CT of the chest is interpreted by Vrad as \"#1 no evidence of pulmonary embolus to the segmental level.  #2 no aneurysm of the aorta.  #3 no dissection of the aorta.  #4 opacities in the lower lobes and lingula may represent atelectasis or pneumonia.\"  Discussed that these findings with the patient.  We will give IV Rocephin.  The plan will be to discharge the patient on azithromycin.  I will also prescribe an albuterol inhaler.  I will advise the patient to return immediately for symptoms are not improving or if they seem to be getting worse and also urged her to recheck by her primary care provider as soon as possible this coming week.              {EKG done and interpreted by myself.  It shows a ventricular rate of 93 bpm and " a normal sinus rhythm.  OK intervals 150 ms.  Corrected QT is 499 ms.  There is no ST segment elevation or depression.  There is no inappropriate T wave inversion.  There does not appear to be any evidence of acute ischemia.               Results for orders placed or performed during the hospital encounter of 10/08/22 (from the past 24 hour(s))   [facility]    Narrative    The following orders were created for panel order [facility].  Procedure                               Abnormality         Status                     ---------                               -----------         ------                     Extra Blue Top Tube[[id number]]                              Final result               Extra Red Top Tube[[id number]]                               Final result               Extra Green Top (Lithium...[[id number]]                      Final result               Extra Purple Top Tube[[id number]]                            Final result               Extra Green Top (Lithium...[[id number]]                      Final result                 Please view results for these tests on the individual orders.   Extra Blue Top Tube   Result Value Ref Range    Hold Specimen JIC    Extra Red Top Tube   Result Value Ref Range    Hold Specimen JIC    Extra Green Top (Lithium Heparin) Tube   Result Value Ref Range    Hold Specimen JIC    Extra Purple Top Tube   Result Value Ref Range    Hold Specimen JIC    Extra Green Top (Lithium Heparin) ON ICE   Result Value Ref Range    Hold Specimen JIC    INR   Result Value Ref Range    INR 0.97 0.85 - 1.15   Comprehensive metabolic panel   Result Value Ref Range    Sodium 144 133 - 144 mmol/L    Potassium 3.1 (L) 3.4 - 5.3 mmol/L    Chloride 108 94 - 109 mmol/L    Carbon Dioxide (CO2) 30 20 - 32 mmol/L    Anion Gap 6 3 - 14 mmol/L    Urea Nitrogen 12 7 - 30 mg/dL    Creatinine 0.81 0.52 - 1.04 mg/dL    Calcium 9.1 8.5 - 10.1 mg/dL    Glucose 114 (H) 70 - 99 mg/dL    Alkaline Phosphatase 137  40 - 150 U/L    AST 17 0 - 45 U/L    ALT 30 0 - 50 U/L    Protein Total 7.0 6.8 - 8.8 g/dL    Albumin 3.5 3.4 - 5.0 g/dL    Bilirubin Total 0.4 0.2 - 1.3 mg/dL    GFR Estimate 81 >60 mL/min/1.73m2   Troponin I   Result Value Ref Range    Troponin I High Sensitivity 6 <54 ng/L   Nt probnp inpatient (BNP)   Result Value Ref Range    N terminal Pro BNP Inpatient 93 0 - 900 pg/mL   TSH with free T4 reflex   Result Value Ref Range    TSH 4.13 (H) 0.40 - 4.00 mU/L   T4 free   Result Value Ref Range    Free T4 1.03 0.76 - 1.46 ng/dL   CBC with platelets differential    Narrative    The following orders were created for panel order CBC with platelets differential.  Procedure                               Abnormality         Status                     ---------                               -----------         ------                     CBC with platelets and d...[[id number]]                      Final result                 Please view results for these tests on the individual orders.   CBC with platelets and differential   Result Value Ref Range    WBC Count 6.2 4.0 - 11.0 10e3/uL    RBC Count 4.27 3.80 - 5.20 10e6/uL    Hemoglobin 12.6 11.7 - 15.7 g/dL    Hematocrit 37.7 35.0 - 47.0 %    MCV 88 78 - 100 fL    MCH 29.5 26.5 - 33.0 pg    MCHC 33.4 31.5 - 36.5 g/dL    RDW 12.9 10.0 - 15.0 %    Platelet Count 262 150 - 450 10e3/uL    % Neutrophils 57 %    % Lymphocytes 31 %    % Monocytes 8 %    % Eosinophils 3 %    % Basophils 1 %    % Immature Granulocytes 0 %    NRBCs per 100 WBC 0 <1 /100    Absolute Neutrophils 3.6 1.6 - 8.3 10e3/uL    Absolute Lymphocytes 1.9 0.8 - 5.3 10e3/uL    Absolute Monocytes 0.5 0.0 - 1.3 10e3/uL    Absolute Eosinophils 0.2 0.0 - 0.7 10e3/uL    Absolute Basophils 0.0 0.0 - 0.2 10e3/uL    Absolute Immature Granulocytes 0.0 <=0.4 10e3/uL    Absolute NRBCs 0.0 10e3/uL       Medications   cefTRIAXone in d5w (ROCEPHIN) intermittent infusion 1 g (1 g Intravenous New Bag 10/8/22 1052)   atenolol  (TENORMIN) tablet 50 mg (50 mg Oral Given 10/8/22 0815)   sodium chloride (PF) 0.9% PF flush 100 mL (100 mLs Intravenous Given 10/8/22 0847)   iopamidol (ISOVUE-370) solution 50 mL (50 mLs Intravenous Given 10/8/22 0847)   potassium chloride ER (KLOR-CON M) CR tablet 40 mEq (40 mEq Oral Given 10/8/22 0903)       Assessments & Plan (with Medical Decision Making)     I have reviewed the nursing notes.    I have reviewed the findings, diagnosis, plan and need for follow up with the patient.      New Prescriptions    ALBUTEROL (PROAIR HFA/PROVENTIL HFA/VENTOLIN HFA) 108 (90 BASE) MCG/ACT INHALER    Inhale 2 puffs into the lungs every 6 hours as needed for shortness of breath / dyspnea or wheezing    AZITHROMYCIN (ZITHROMAX Z-ELAYNE) 250 MG TABLET    Two tablets on the first day, then one tablet daily for the next 4 days       Final diagnoses:   Pneumonia of both lower lobes due to infectious organism       10/8/2022   [facility]     Hao Sagastume,   10/08/22 1212

## 2022-10-08 NOTE — DISCHARGE INSTRUCTIONS

## 2022-11-23 ENCOUNTER — OFFICE VISIT (OUTPATIENT)
Dept: CHIROPRACTIC MEDICINE | Facility: OTHER | Age: 62
End: 2022-11-23
Attending: CHIROPRACTOR
Payer: COMMERCIAL

## 2022-11-23 DIAGNOSIS — M99.03 SEGMENTAL AND SOMATIC DYSFUNCTION OF LUMBAR REGION: Primary | ICD-10-CM

## 2022-11-23 DIAGNOSIS — M99.01 SEGMENTAL AND SOMATIC DYSFUNCTION OF CERVICAL REGION: ICD-10-CM

## 2022-11-23 DIAGNOSIS — M54.50 ACUTE BILATERAL LOW BACK PAIN WITHOUT SCIATICA: ICD-10-CM

## 2022-11-23 DIAGNOSIS — M99.02 SEGMENTAL AND SOMATIC DYSFUNCTION OF THORACIC REGION: ICD-10-CM

## 2022-11-23 PROCEDURE — 98941 CHIROPRACT MANJ 3-4 REGIONS: CPT | Mod: AT | Performed by: CHIROPRACTOR

## 2022-11-30 ENCOUNTER — MEDICAL CORRESPONDENCE (OUTPATIENT)
Dept: HEALTH INFORMATION MANAGEMENT | Facility: HOSPITAL | Age: 62
End: 2022-11-30

## 2022-11-30 ENCOUNTER — TRANSFERRED RECORDS (OUTPATIENT)
Dept: HEALTH INFORMATION MANAGEMENT | Facility: HOSPITAL | Age: 62
End: 2022-11-30

## 2022-12-01 ENCOUNTER — MEDICAL CORRESPONDENCE (OUTPATIENT)
Dept: MRI IMAGING | Facility: HOSPITAL | Age: 62
End: 2022-12-01

## 2022-12-02 ENCOUNTER — MEDICAL CORRESPONDENCE (OUTPATIENT)
Dept: HEALTH INFORMATION MANAGEMENT | Facility: HOSPITAL | Age: 62
End: 2022-12-02

## 2022-12-06 RX ORDER — ATENOLOL 25 MG/1
50 TABLET ORAL DAILY
COMMUNITY
Start: 2021-12-23 | End: 2023-11-28

## 2022-12-06 RX ORDER — TRAZODONE HYDROCHLORIDE 100 MG/1
100 TABLET ORAL AT BEDTIME
COMMUNITY
Start: 2022-11-18

## 2022-12-06 RX ORDER — ATORVASTATIN CALCIUM 40 MG/1
40 TABLET, FILM COATED ORAL EVERY EVENING
COMMUNITY
Start: 2022-02-19 | End: 2023-05-08

## 2022-12-06 RX ORDER — ACETAMINOPHEN 500 MG
500 TABLET ORAL
COMMUNITY

## 2022-12-06 RX ORDER — CLOBETASOL PROPIONATE 0.5 MG/ML
SOLUTION TOPICAL
COMMUNITY
Start: 2022-11-14 | End: 2023-05-08

## 2022-12-06 RX ORDER — DULOXETIN HYDROCHLORIDE 60 MG/1
60 CAPSULE, DELAYED RELEASE ORAL DAILY
COMMUNITY
Start: 2022-11-18

## 2022-12-06 RX ORDER — IBUPROFEN 200 MG
200 TABLET ORAL
COMMUNITY

## 2022-12-06 RX ORDER — ATENOLOL 50 MG/1
50 TABLET ORAL DAILY
COMMUNITY
Start: 2022-07-26 | End: 2023-05-08

## 2022-12-19 ENCOUNTER — TELEPHONE (OUTPATIENT)
Dept: INTERVENTIONAL RADIOLOGY/VASCULAR | Facility: HOSPITAL | Age: 62
End: 2022-12-19

## 2022-12-22 ENCOUNTER — HOSPITAL ENCOUNTER (OUTPATIENT)
Dept: MRI IMAGING | Facility: HOSPITAL | Age: 62
Discharge: HOME | End: 2022-12-22
Attending: NURSE PRACTITIONER | Admitting: RADIOLOGY
Payer: COMMERCIAL

## 2022-12-22 ENCOUNTER — HOSPITAL ENCOUNTER (OUTPATIENT)
Facility: HOSPITAL | Age: 62
Discharge: HOME | End: 2022-12-22
Attending: RADIOLOGY | Admitting: RADIOLOGY
Payer: COMMERCIAL

## 2022-12-22 ENCOUNTER — HOSPITAL ENCOUNTER (OUTPATIENT)
Dept: INTERVENTIONAL RADIOLOGY/VASCULAR | Facility: HOSPITAL | Age: 62
Discharge: HOME | End: 2022-12-22
Attending: NURSE PRACTITIONER | Admitting: RADIOLOGY
Payer: COMMERCIAL

## 2022-12-22 DIAGNOSIS — M25.512 PAIN IN LEFT SHOULDER: ICD-10-CM

## 2022-12-22 PROCEDURE — 250N000009 HC RX 250: Performed by: RADIOLOGY

## 2022-12-22 PROCEDURE — 255N000002 HC RX 255 OP 636: Performed by: RADIOLOGY

## 2022-12-22 PROCEDURE — A9585 GADOBUTROL INJECTION: HCPCS | Performed by: RADIOLOGY

## 2022-12-22 PROCEDURE — 73222 MRI JOINT UPR EXTREM W/DYE: CPT | Mod: LT

## 2022-12-22 PROCEDURE — 77002 NEEDLE LOCALIZATION BY XRAY: CPT

## 2022-12-22 RX ORDER — GADOBUTROL 604.72 MG/ML
0.1 INJECTION INTRAVENOUS ONCE
Status: COMPLETED | OUTPATIENT
Start: 2022-12-22 | End: 2022-12-22

## 2022-12-22 RX ORDER — LIDOCAINE HYDROCHLORIDE 10 MG/ML
10 INJECTION, SOLUTION EPIDURAL; INFILTRATION; INTRACAUDAL; PERINEURAL ONCE
Status: COMPLETED | OUTPATIENT
Start: 2022-12-22 | End: 2022-12-22

## 2022-12-22 RX ORDER — IOPAMIDOL 612 MG/ML
50 INJECTION, SOLUTION INTRAVASCULAR ONCE
Status: COMPLETED | OUTPATIENT
Start: 2022-12-22 | End: 2022-12-22

## 2022-12-22 RX ADMIN — IOPAMIDOL 3 ML: 612 INJECTION, SOLUTION INTRAVENOUS at 08:31

## 2022-12-22 RX ADMIN — LIDOCAINE HYDROCHLORIDE 3 ML: 10 INJECTION, SOLUTION EPIDURAL; INFILTRATION; INTRACAUDAL; PERINEURAL at 08:31

## 2022-12-22 RX ADMIN — GADOBUTROL 0.1 ML: 604.72 INJECTION INTRAVENOUS at 08:32

## 2022-12-22 ASSESSMENT — ACTIVITIES OF DAILY LIVING (ADL): ADLS_ACUITY_SCORE: 35

## 2022-12-22 NOTE — PROGRESS NOTES
Subjective Finding:    Chief compalint: Patient presents with:  Back Pain  , Pain Scale: 7/10, Intensity: dull and ache, Duration: 10 days, Radiating: no.    Date of injury:     Activities that the pain restricts:   Home/household/hobbies/social activities: yes.  Work duties: no.  Sleep: no.  Makes symptoms better: rest.  Makes symptoms worse: activity and lumbar flexion.  Have you seen anyone else for the symptoms? no.  Work related: .  Automobile related injury: no.    Objective and Assessment:    Posture Analysis:   High shoulder: .  Head tilt: .  High iliac crest: .  Head carriage: neutral.  Thoracic Kyphosis: neutral.  Lumbar Lordosis: forward.    Lumbar Range of Motion: extension decreased, left lateral flexion decreased and right lateral flexion decreased.  Cervical Range of Motion: .  Thoracic Range of Motion: extension decreased.  Extremity Range of Motion: .    Palpation:   Subocc tightness    Segmental dysfunction pre-treatment and treatment area: T10  Left  L23 .  C1    Assessment post-treatment:  Cervical: .  Thoracic: ROM increased.  Lumbar: ROM increased and pain and tenderness decreased.    Comments: .      Complicating Factors: .    Plan / Procedure:    Treatment plan: PRN.  Instructed patient: stretch as instructed at visit and walk 10 minutes.  Short term goals: increase ROM.  Long term goals: restore normal function.  Prognosis: good.             
Alert-The patient is alert, awake and responds to voice. The patient is oriented to time, place, and person. The triage nurse is able to obtain subjective information.

## 2023-04-17 ENCOUNTER — OFFICE VISIT (OUTPATIENT)
Dept: CHIROPRACTIC MEDICINE | Facility: OTHER | Age: 63
End: 2023-04-17
Attending: CHIROPRACTOR
Payer: COMMERCIAL

## 2023-04-17 DIAGNOSIS — M99.01 SEGMENTAL AND SOMATIC DYSFUNCTION OF CERVICAL REGION: ICD-10-CM

## 2023-04-17 DIAGNOSIS — M99.03 SEGMENTAL AND SOMATIC DYSFUNCTION OF LUMBAR REGION: Primary | ICD-10-CM

## 2023-04-17 DIAGNOSIS — M54.50 ACUTE BILATERAL LOW BACK PAIN WITHOUT SCIATICA: ICD-10-CM

## 2023-04-17 DIAGNOSIS — M99.02 SEGMENTAL AND SOMATIC DYSFUNCTION OF THORACIC REGION: ICD-10-CM

## 2023-04-17 PROCEDURE — 98941 CHIROPRACT MANJ 3-4 REGIONS: CPT | Mod: AT | Performed by: CHIROPRACTOR

## 2023-04-18 ENCOUNTER — OFFICE VISIT (OUTPATIENT)
Dept: CHIROPRACTIC MEDICINE | Facility: OTHER | Age: 63
End: 2023-04-18
Attending: CHIROPRACTOR
Payer: COMMERCIAL

## 2023-04-18 DIAGNOSIS — M54.50 ACUTE BILATERAL LOW BACK PAIN WITHOUT SCIATICA: ICD-10-CM

## 2023-04-18 DIAGNOSIS — M99.03 SEGMENTAL AND SOMATIC DYSFUNCTION OF LUMBAR REGION: Primary | ICD-10-CM

## 2023-04-18 DIAGNOSIS — M99.02 SEGMENTAL AND SOMATIC DYSFUNCTION OF THORACIC REGION: ICD-10-CM

## 2023-04-18 DIAGNOSIS — M99.01 SEGMENTAL AND SOMATIC DYSFUNCTION OF CERVICAL REGION: ICD-10-CM

## 2023-04-18 PROCEDURE — 98941 CHIROPRACT MANJ 3-4 REGIONS: CPT | Mod: AT | Performed by: CHIROPRACTOR

## 2023-04-18 NOTE — PROGRESS NOTES
Subjective Finding:    Chief compalint: Patient presents with:  Back Pain: Low and neck pain    , Pain Scale: 6/10, Intensity: dull and ache, Duration: 4 days, Radiating: no.    Date of injury:     Activities that the pain restricts:   Home/household/hobbies/social activities: yes.  Work duties: no.  Sleep: no.  Makes symptoms better: rest.  Makes symptoms worse: activity and lumbar flexion.  Have you seen anyone else for the symptoms? no.  Work related: .  Automobile related injury: no.    Objective and Assessment:    Posture Analysis:   High shoulder: .  Head tilt: .  High iliac crest: .  Head carriage: neutral.  Thoracic Kyphosis: neutral.  Lumbar Lordosis: forward.    Lumbar Range of Motion: extension decreased, left lateral flexion decreased and right lateral flexion decreased.  Cervical Range of Motion: .  Thoracic Range of Motion: extension decreased.  Extremity Range of Motion: .    Palpation:   Subocc tightness    Segmental dysfunction pre-treatment and treatment area: T10  Left  L23 .  C1    Assessment post-treatment:  Cervical: .  Thoracic: ROM increased.  Lumbar: ROM increased and pain and tenderness decreased.    Comments: .      Complicating Factors: .    Plan / Procedure:      [id number]  [facility]  T  L    Treatment plan: PRN.  Instructed patient: stretch as instructed at visit and walk 10 minutes.  Short term goals: increase ROM.  Long term goals: restore normal function.  Prognosis: good.

## 2023-04-19 NOTE — PROGRESS NOTES
Subjective Finding:    Chief compalint: Patient presents with:  Back Pain: Some relief with last treatment.  Still having center low back pain'  , Pain Scale: 4/10, Intensity: dull and ache, Duration: 6 days, Radiating: no.    Date of injury:     Activities that the pain restricts:   Home/household/hobbies/social activities: yes.  Work duties: no.  Sleep: no.  Makes symptoms better: rest.  Makes symptoms worse: activity and lumbar flexion.  Have you seen anyone else for the symptoms? no.  Work related: .  Automobile related injury: no.    Objective and Assessment:    Posture Analysis:   High shoulder: .  Head tilt: .  High iliac crest: .  Head carriage: neutral.  Thoracic Kyphosis: neutral.  Lumbar Lordosis: forward.    Lumbar Range of Motion: extension decreased, left lateral flexion decreased and right lateral flexion decreased.  Cervical Range of Motion: .  Thoracic Range of Motion: extension decreased.  Extremity Range of Motion: .    Palpation:   Subocc tightness    Segmental dysfunction pre-treatment and treatment area: T10  Left  L23 .  C1    Assessment post-treatment:  Cervical: .  Thoracic: ROM increased.  Lumbar: ROM increased and pain and tenderness decreased.    Comments: .      Complicating Factors: .    Plan / Procedure:      [id number]  [facility]  T  L    Treatment plan: PRN.  Instructed patient: stretch as instructed at visit and walk 10 minutes.  Short term goals: increase ROM.  Long term goals: restore normal function.  Prognosis: good.

## 2023-04-23 ENCOUNTER — HOSPITAL ENCOUNTER (EMERGENCY)
Facility: HOSPITAL | Age: 63
Discharge: HOME | End: 2023-04-23
Attending: NURSE PRACTITIONER | Admitting: NURSE PRACTITIONER
Payer: COMMERCIAL

## 2023-04-23 ENCOUNTER — APPOINTMENT (OUTPATIENT)
Dept: CT IMAGING | Facility: HOSPITAL | Age: 63
End: 2023-04-23
Attending: NURSE PRACTITIONER
Payer: COMMERCIAL

## 2023-04-23 VITALS
DIASTOLIC BLOOD PRESSURE: 81 MMHG | WEIGHT: 167.55 LBS | HEART RATE: 69 BPM | OXYGEN SATURATION: 95 % | BODY MASS INDEX: 27.04 KG/M2 | RESPIRATION RATE: 16 BRPM | TEMPERATURE: 98 F | SYSTOLIC BLOOD PRESSURE: 160 MMHG

## 2023-04-23 DIAGNOSIS — S39.012A LOW BACK STRAIN, INITIAL ENCOUNTER: ICD-10-CM

## 2023-04-23 LAB
ALBUMIN SERPL BCG-MCNC: 4.1 G/DL (ref 3.5–5.2)
ALBUMIN UR-MCNC: NEGATIVE MG/DL
ALP SERPL-CCNC: 166 U/L (ref 35–104)
ALT SERPL W P-5'-P-CCNC: 37 U/L (ref 10–35)
ANION GAP SERPL CALCULATED.3IONS-SCNC: 6 MMOL/L (ref 7–15)
APPEARANCE UR: CLEAR
AST SERPL W P-5'-P-CCNC: 27 U/L (ref 10–35)
BASOPHILS # BLD AUTO: 0.1 10E3/UL (ref 0–0.2)
BASOPHILS NFR BLD AUTO: 1 %
BILIRUB SERPL-MCNC: 0.3 MG/DL
BILIRUB UR QL STRIP: NEGATIVE
BUN SERPL-MCNC: 11.7 MG/DL (ref 8–23)
CALCIUM SERPL-MCNC: 9.2 MG/DL (ref 8.8–10.2)
CHLORIDE SERPL-SCNC: 101 MMOL/L (ref 98–107)
COLOR UR AUTO: YELLOW
CREAT SERPL-MCNC: 0.91 MG/DL (ref 0.51–0.95)
CRP SERPL-MCNC: <3 MG/L
DEPRECATED HCO3 PLAS-SCNC: 33 MMOL/L (ref 22–29)
EOSINOPHIL # BLD AUTO: 0.2 10E3/UL (ref 0–0.7)
EOSINOPHIL NFR BLD AUTO: 3 %
ERYTHROCYTE [DISTWIDTH] IN BLOOD BY AUTOMATED COUNT: 12.7 % (ref 10–15)
GFR SERPL CREATININE-BSD FRML MDRD: 70 ML/MIN/1.73M2
GLUCOSE SERPL-MCNC: 105 MG/DL (ref 70–99)
GLUCOSE UR STRIP-MCNC: NEGATIVE MG/DL
HCT VFR BLD AUTO: 40.8 % (ref 35–47)
HGB BLD-MCNC: 13.4 G/DL (ref 11.7–15.7)
HGB UR QL STRIP: NEGATIVE
HOLD SPECIMEN: NORMAL
IMM GRANULOCYTES # BLD: 0 10E3/UL
IMM GRANULOCYTES NFR BLD: 0 %
KETONES UR STRIP-MCNC: NEGATIVE MG/DL
LEUKOCYTE ESTERASE UR QL STRIP: NEGATIVE
LIPASE SERPL-CCNC: 29 U/L (ref 13–60)
LYMPHOCYTES # BLD AUTO: 2.7 10E3/UL (ref 0.8–5.3)
LYMPHOCYTES NFR BLD AUTO: 28 %
MCH RBC QN AUTO: 30.2 PG (ref 26.5–33)
MCHC RBC AUTO-ENTMCNC: 32.8 G/DL (ref 31.5–36.5)
MCV RBC AUTO: 92 FL (ref 78–100)
MONOCYTES # BLD AUTO: 0.7 10E3/UL (ref 0–1.3)
MONOCYTES NFR BLD AUTO: 7 %
MUCOUS THREADS #/AREA URNS LPF: PRESENT /LPF
NEUTROPHILS # BLD AUTO: 6 10E3/UL (ref 1.6–8.3)
NEUTROPHILS NFR BLD AUTO: 61 %
NITRATE UR QL: NEGATIVE
NRBC # BLD AUTO: 0 10E3/UL
NRBC BLD AUTO-RTO: 0 /100
PH UR STRIP: 6 [PH] (ref 4.7–8)
PLATELET # BLD AUTO: 286 10E3/UL (ref 150–450)
POTASSIUM SERPL-SCNC: 3.5 MMOL/L (ref 3.4–5.3)
PROT SERPL-MCNC: 7.1 G/DL (ref 6.4–8.3)
RBC # BLD AUTO: 4.44 10E6/UL (ref 3.8–5.2)
RBC URINE: 1 /HPF
SODIUM SERPL-SCNC: 140 MMOL/L (ref 136–145)
SP GR UR STRIP: 1.01 (ref 1–1.03)
SQUAMOUS EPITHELIAL: 0 /HPF
UROBILINOGEN UR STRIP-MCNC: NORMAL MG/DL
WBC # BLD AUTO: 9.7 10E3/UL (ref 4–11)
WBC URINE: <1 /HPF

## 2023-04-23 PROCEDURE — 258N000003 HC RX IP 258 OP 636: Performed by: NURSE PRACTITIONER

## 2023-04-23 PROCEDURE — 83690 ASSAY OF LIPASE: CPT | Performed by: NURSE PRACTITIONER

## 2023-04-23 PROCEDURE — 36415 COLL VENOUS BLD VENIPUNCTURE: CPT | Performed by: NURSE PRACTITIONER

## 2023-04-23 PROCEDURE — 74177 CT ABD & PELVIS W/CONTRAST: CPT

## 2023-04-23 PROCEDURE — 85025 COMPLETE CBC W/AUTO DIFF WBC: CPT | Performed by: NURSE PRACTITIONER

## 2023-04-23 PROCEDURE — 99285 EMERGENCY DEPT VISIT HI MDM: CPT | Mod: 25

## 2023-04-23 PROCEDURE — 250N000011 HC RX IP 250 OP 636: Performed by: NURSE PRACTITIONER

## 2023-04-23 PROCEDURE — 86140 C-REACTIVE PROTEIN: CPT | Performed by: NURSE PRACTITIONER

## 2023-04-23 PROCEDURE — 96361 HYDRATE IV INFUSION ADD-ON: CPT

## 2023-04-23 PROCEDURE — 96375 TX/PRO/DX INJ NEW DRUG ADDON: CPT

## 2023-04-23 PROCEDURE — 96374 THER/PROPH/DIAG INJ IV PUSH: CPT | Mod: XU

## 2023-04-23 PROCEDURE — 72131 CT LUMBAR SPINE W/O DYE: CPT

## 2023-04-23 PROCEDURE — 80053 COMPREHEN METABOLIC PANEL: CPT | Performed by: NURSE PRACTITIONER

## 2023-04-23 PROCEDURE — 81001 URINALYSIS AUTO W/SCOPE: CPT | Performed by: NURSE PRACTITIONER

## 2023-04-23 PROCEDURE — 99284 EMERGENCY DEPT VISIT MOD MDM: CPT | Performed by: NURSE PRACTITIONER

## 2023-04-23 RX ORDER — IOPAMIDOL 755 MG/ML
82 INJECTION, SOLUTION INTRAVASCULAR ONCE
Status: COMPLETED | OUTPATIENT
Start: 2023-04-23 | End: 2023-04-23

## 2023-04-23 RX ORDER — KETOROLAC TROMETHAMINE 15 MG/ML
15 INJECTION, SOLUTION INTRAMUSCULAR; INTRAVENOUS ONCE
Status: COMPLETED | OUTPATIENT
Start: 2023-04-23 | End: 2023-04-23

## 2023-04-23 RX ORDER — HYDROCODONE BITARTRATE AND ACETAMINOPHEN 5; 325 MG/1; MG/1
1 TABLET ORAL EVERY 6 HOURS PRN
Qty: 10 TABLET | Refills: 0 | Status: SHIPPED | OUTPATIENT
Start: 2023-04-23 | End: 2023-04-26

## 2023-04-23 RX ORDER — HYDROMORPHONE HYDROCHLORIDE 1 MG/ML
0.2 INJECTION, SOLUTION INTRAMUSCULAR; INTRAVENOUS; SUBCUTANEOUS
Status: COMPLETED | OUTPATIENT
Start: 2023-04-23 | End: 2023-04-23

## 2023-04-23 RX ADMIN — HYDROMORPHONE HYDROCHLORIDE 0.2 MG: 1 INJECTION, SOLUTION INTRAMUSCULAR; INTRAVENOUS; SUBCUTANEOUS at 11:44

## 2023-04-23 RX ADMIN — IOPAMIDOL 82 ML: 755 INJECTION, SOLUTION INTRAVENOUS at 12:48

## 2023-04-23 RX ADMIN — SODIUM CHLORIDE, POTASSIUM CHLORIDE, SODIUM LACTATE AND CALCIUM CHLORIDE 1000 ML: 600; 310; 30; 20 INJECTION, SOLUTION INTRAVENOUS at 11:43

## 2023-04-23 RX ADMIN — PROCHLORPERAZINE EDISYLATE 5 MG: 5 INJECTION INTRAMUSCULAR; INTRAVENOUS at 11:47

## 2023-04-23 RX ADMIN — KETOROLAC TROMETHAMINE 15 MG: 15 INJECTION, SOLUTION INTRAMUSCULAR; INTRAVENOUS at 11:44

## 2023-04-23 ASSESSMENT — ACTIVITIES OF DAILY LIVING (ADL): ADLS_ACUITY_SCORE: 35

## 2023-04-23 NOTE — DISCHARGE INSTRUCTIONS
Back Pain:   Do back stretching multiple times each day as you were shown in office.  This helps keep the muscles loose in the back.  When the muscles get stiff, the pain will worsen.     IF you were prescribed a muscle relaxer, use this before bed to help with the muscle stiffness.  It is very important to do this right away in the morning to help with muscle stiffness.     DO NOT stay inactive during this time.  Being sedentary will cause muscle guarding of the back, which means you will be in more pain.  Walking is a good treatment to help keep the back muscles loose.  You will need to pay attention to how your back feels.  Do not over exert yourself, but do not remain sedentary.     Use warm compresses to the back, 20 minutes at a time, every 3 hours as needed for pain.  Make sure the compresses are so hot that the cause burning to the skin.  Also use ibuprofen and acetaminophen for pain relief if not contraindicated.       Narcotic pain medications (Norco):   The medications prescribed can be quite effective for control of your pain but ARE NOT a safe long-term choice for your symptom control.  This medication is highly addictive and can lead to medication abuse.  It is recommended to try the ibuprofen/acetaminophen regimen (if able) described below before using the narcotic.   Do not drive, operate machinery, or do work that could harm people when under the influence of narcotic pain medications.  It can impair perception, reaction time, motor skills, and attention in ways that make it dangerous to drive, operate machinery, or engage in any activity at home or at work that could harm others or cause professional malpractice.  Just how long such impairments will last for a particular individual taking a particular type and dose is unknown, but it is at least several hours.  Drinking alcohol while taking narcotic pain medications makes impairment much worse, so abstain for alcohol use.    I recommend taking a  stool softener such as Colace or Senokot-S which can be purchased over the counter while you are taking narcotics since these medications can be constipating.  You may also try Miralax OTC if needed for constipation.     Pain control:   If your past medical conditions, allergies, current medications, or current status does not prevent you from using acetaminophen and/or ibuprofen, use the following: Take acetaminophen 650-1000 mg every 6 hours as needed for pain in addition to ibuprofen 400-600 mg every 6 hours as needed for pain.  Take these two medications together.        Follow-up with your primary care provider for reevaluation.  Contact your primary care provider if you have any questions or concerns.  Do not hesitate to return to the ER if any new or worsening symptoms.     Please read the attached instructions (if any).  They highlight more specific treatments and interventions for you at home.              Thank you for letting me participate in your care and wish you a fast and uneventful recovery,    Golden DORAN CNP    Do not hesitate to contact me with questions or concerns.  [email or web address]  [email or web address]

## 2023-04-23 NOTE — ED PROVIDER NOTES
EMERGENCY MEDICINE NOTE - DAMI DORAN, CNP    ID:   Niesha Lyle    CC:  Chief Complaint   Patient presents with     Flank Pain        MEANS OF ARRIVAL:  private car    PCP:   Venus Donovan    SUBJECTIVE:   HPI:   Niesha Lyle is a 63 year old individual with history of hypertension, migraines, osteopenia, extrapyramidal disease, Raynaud's, RLS, sciatica, thoracic/lumbosacral neuritis or radiculitis, comes in today for right sided back pain.   Patient states was shoveling snow about 1 week ago.  Developed severe right flank/back pain that has been consistent since over the past week.  States has seen chiropractics which made it worse, continues to have pain despite taking muscle relaxers and over-the-counter medications.     Denies any dysuria, hematuria, fever, chills, vomiting, diarrhea.    Review of Systems:  Significant positives/negatives were reviewed and mentioned in HPI.  All remaining body systems are negative or non-contributory.    I have reviewed the PMH, Meds, Allergies, and SH, including:  ALLERGIES:  Allergies   Allergen Reactions     Lisinopril        CURRENT MEDICATIONS:  Current Outpatient Rx   Medication Sig Dispense Refill     acetaminophen (TYLENOL) 500 MG tablet Take 500 mg by mouth       albuterol (PROAIR HFA/PROVENTIL HFA/VENTOLIN HFA) 108 (90 Base) MCG/ACT inhaler Inhale 2 puffs into the lungs every 6 hours as needed for shortness of breath / dyspnea or wheezing 18 g 0     Aspirin Buf,CaCarb-MgCarb-MgO, 81 MG TABS        atenolol (TENORMIN) 25 MG tablet Take 25 mg by mouth daily       atenolol (TENORMIN) 50 MG tablet Take 50 mg by mouth daily       ATENOLOL PO Take 50 mg by mouth daily       atorvastatin (LIPITOR) 40 MG tablet Take 40 mg by mouth every evening       atorvastatin (LIPITOR) 80 MG tablet Take 80 mg by mouth every evening       Calcium Carbonate-Vitamin D (CALCIUM + D PO) Take 1 tablet by mouth daily       clobetasol (TEMOVATE) 0.05 % external  solution APPLY 1 SOLUTION TOPICALLY TO AFFECTED AREA TWICE DAILY AS NEEDED       DULoxetine (CYMBALTA) 60 MG capsule Take 60 mg by mouth daily       DULoxetine HCl (CYMBALTA PO) Take 60 mg by mouth daily       fluticasone-vilanterol (BREO ELLIPTA) 200-25 MCG/INH inhaler Inhale 1 puff into the lungs daily       ibuprofen (ADVIL/MOTRIN) 200 MG tablet Take 200 mg by mouth       IBUPROFEN PO Take 600 mg by mouth       omeprazole (PRILOSEC) 40 MG DR capsule Take 40 mg by mouth 2 times daily       tiZANidine (ZANAFLEX) 4 MG capsule Take 4 mg by mouth 3 times daily as needed for muscle spasms       tiZANidine (ZANAFLEX) 4 MG tablet Take 4 mg by mouth 3 times daily as needed       traZODone (DESYREL) 100 MG tablet Take 100 mg by mouth At Bedtime       TRAZODONE HCL PO Take 100 mg by mouth At Bedtime          PMH:  Patient Active Problem List   Diagnosis     Essential hypertension     Migraine without intractable migraine     Osteopenia     Other extrapyramidal disease and abnormal movement disorder     Raynaud's disease     Restless legs syndrome (RLS)     Sciatica     Thoracic or lumbosacral neuritis or radiculitis     No past surgical history on file.  Social History     Tobacco Use     Smoking status: Never     Smokeless tobacco: Never   Substance Use Topics     Alcohol use: No     Drug use: No       OBJECTIVE:     Vitals:    04/23/23 1110   BP: 160/81   Pulse: 69   Resp: 16   Temp: 98  F (36.7  C)   TempSrc: Tympanic   SpO2: 95%   Weight: 76 kg (167 lb 8.8 oz)     Body mass index is 27.04 kg/m .  GENERAL APPEARANCE:  The patient is a 63 year old well-developed, well-nourished individual who is painful upon arrival.  CHEST:  Symmetric.  Right lower rib tenderness to palpation.  No crepitus or deformity.  LUNGS:  Breathing is easy.  Breath sounds are equal and clear bilaterally.  No wheezes, rhonchi, or rales.  HEART:  Regular rate and rhythm with normal S1 and S2.  No murmurs, gallops, or rubs.  ABDOMEN:  Soft and  rotund.  No mass, tenderness, guarding, or rebound.  No organomegaly or hernia.  Bowel sounds are present.  No flank mass.  No abdominal bruits or thrills present upon auscultation/palpation.  MUSCULOSKELETAL: Guarded gait and station.  Midline lumbar spinal tenderness to palpation.  Also has right-sided paraspinal tenderness to palpation.  Strength equal in bilateral lower extremities.  Range of motion to hips and knees intact.  NEUROLOGIC:  No focal sensory or motor deficits are noted.    PSYCHIATRIC:  The patient is awake, alert, and oriented x4.  Recent and remote memory is intact.  Appropriate mood and affect.  Calm and cooperative with history and physical exam.  SKIN:  Warm, dry, and well perfused.  Good turgor.  No lesions, nodules, or rashes are noted.  No bruising noted.     Comment: Discrepancies between my note and notes on behalf of the nursing team or other care providers are secondary to my findings reflecting my physical examination and questioning of the patient.  Any conflicting information provided is not in line with my examination of the patient.    LAB:     Recent Results (from the past 24 hour(s))   Comprehensive metabolic panel    Collection Time: 04/23/23 11:34 AM   Result Value Ref Range    Sodium 140 136 - 145 mmol/L    Potassium 3.5 3.4 - 5.3 mmol/L    Chloride 101 98 - 107 mmol/L    Carbon Dioxide (CO2) 33 (H) 22 - 29 mmol/L    Anion Gap 6 (L) 7 - 15 mmol/L    Urea Nitrogen 11.7 8.0 - 23.0 mg/dL    Creatinine 0.91 0.51 - 0.95 mg/dL    Calcium 9.2 8.8 - 10.2 mg/dL    Glucose 105 (H) 70 - 99 mg/dL    Alkaline Phosphatase 166 (H) 35 - 104 U/L    AST 27 10 - 35 U/L    ALT 37 (H) 10 - 35 U/L    Protein Total 7.1 6.4 - 8.3 g/dL    Albumin 4.1 3.5 - 5.2 g/dL    Bilirubin Total 0.3 <=1.2 mg/dL    GFR Estimate 70 >60 mL/min/1.73m2   Lipase    Collection Time: 04/23/23 11:34 AM   Result Value Ref Range    Lipase 29 13 - 60 U/L   CRP inflammation    Collection Time: 04/23/23 11:34 AM   Result Value  Ref Range    CRP Inflammation <3.00 <5.00 mg/L   CBC with platelets and differential    Collection Time: 04/23/23 11:34 AM   Result Value Ref Range    WBC Count 9.7 4.0 - 11.0 10e3/uL    RBC Count 4.44 3.80 - 5.20 10e6/uL    Hemoglobin 13.4 11.7 - 15.7 g/dL    Hematocrit 40.8 35.0 - 47.0 %    MCV 92 78 - 100 fL    MCH 30.2 26.5 - 33.0 pg    MCHC 32.8 31.5 - 36.5 g/dL    RDW 12.7 10.0 - 15.0 %    Platelet Count 286 150 - 450 10e3/uL    % Neutrophils 61 %    % Lymphocytes 28 %    % Monocytes 7 %    % Eosinophils 3 %    % Basophils 1 %    % Immature Granulocytes 0 %    NRBCs per 100 WBC 0 <1 /100    Absolute Neutrophils 6.0 1.6 - 8.3 10e3/uL    Absolute Lymphocytes 2.7 0.8 - 5.3 10e3/uL    Absolute Monocytes 0.7 0.0 - 1.3 10e3/uL    Absolute Eosinophils 0.2 0.0 - 0.7 10e3/uL    Absolute Basophils 0.1 0.0 - 0.2 10e3/uL    Absolute Immature Granulocytes 0.0 <=0.4 10e3/uL    Absolute NRBCs 0.0 10e3/uL   Extra Blue Top Tube    Collection Time: 04/23/23 11:34 AM   Result Value Ref Range    Hold Specimen JIC    Extra Red Top Tube    Collection Time: 04/23/23 11:34 AM   Result Value Ref Range    Hold Specimen JIC    Extra Green Top (Lithium Heparin) Tube    Collection Time: 04/23/23 11:34 AM   Result Value Ref Range    Hold Specimen JIC    UA with Microscopic reflex to Culture    Collection Time: 04/23/23 12:35 PM    Specimen: Urine, Midstream   Result Value Ref Range    Color Urine Yellow Colorless, Straw, Light Yellow, Yellow    Appearance Urine Clear Clear    Glucose Urine Negative Negative mg/dL    Bilirubin Urine Negative Negative    Ketones Urine Negative Negative mg/dL    Specific Gravity Urine 1.015 1.003 - 1.035    Blood Urine Negative Negative    pH Urine 6.0 4.7 - 8.0    Protein Albumin Urine Negative Negative mg/dL    Urobilinogen Urine Normal Normal, 2.0 mg/dL    Nitrite Urine Negative Negative    Leukocyte Esterase Urine Negative Negative    Mucus Urine Present (A) None Seen /LPF    RBC Urine 1 <=2 /HPF    WBC  Urine <1 <=5 /HPF    Squamous Epithelials Urine 0 <=1 /HPF       IMAGING STUDIES:     Results for orders placed or performed during the hospital encounter of 04/23/23   Abd/pelvis CT - IV contrast only TRAUMA / AAA    Narrative    Exam:CT ABDOMEN PELVIS W CONTRAST    History: 63 years Female Patient states was shoveling snow about 1  week ago. ?Developed severe right flank/back pain that has been  consistent since over the past week. ?States has seen chiropractics  which made it worse, continues to have pain despite taking muscle  relaxers and over-the-counter medications. ?    Comparisons: 7/3/2014    Technique: Axial CT imaging of the abdomen and pelvis was performed  with intervenous contrast. Coronal and sagittal reconstructions were  obtained.   This exam was performed using one or more of the following dose  reduction techniques:  Automated exposure control, adjustment of the JEFRY and/or KV according  to patient's size, and/or use of iterative reconstruction technique.       FINDINGS:  Lung bases:The lung bases are clear    Abdomen:  Liver:Unremarkable  Gallbladder and biliary tree: The gallbladder is absent. There is no  abnormal biliary dilatation.  Pancreas:Unremarkable  Spleen:Unremarkable  Adrenals:Normal    Kidneys and ureters:Unremarkable. Normal symmetric renal enhancement.  No hydronephrosis.    Lymph nodes:There is no significant lymphadenopathy    Bowel:No abnormally distended or thickened loops of bowel are present.  There is no evidence of bowel obstruction.    Appendix: There are no inflammatory changes in the right lower  quadrant.    Vessels:Unremarkable    Osseous structures:Unremarkable    Pelvis:There is no evidence of mass or lymphadenopathy. No abnormal  fluid collections are present.            Impression    IMPRESSION: No acute intra-abdominal findings.    EARNEST ALEXANDER MD         SYSTEM ID:  [id number]   Lumbar spine CT w/o contrast    Narrative    CT LUMBAR SPINE W/O  CONTRAST    HISTORY: 63 years Female Low back painPatient states was shoveling  snow about 1 week ago. ?Developed severe right flank/back pain that  has been consistent since over the past week. ?States has seen  chiropractics which made it worse, continues to have pain despite  taking muscle relaxers and over-the-counter medications. ?    COMPARISON: MRI 9/15/2015 TECHNIQUE: Axial CT imaging of the lumbar  spine was performed. Coronal and sagittal reconstructions were  obtained. This exam was performed using one or more of the following  dose reduction techniques:  Automated exposure control, adjustment of the JEFRY and/or KV according  to patient's size, and/or use of iterative reconstruction technique.    FINDINGS  Vertebral body height is well-maintained throughout.. There is  degenerative disc disease. There is no evidence of subluxation or  fracture..  L5-S1: There is bilateral facet osteoarthritic change. Disc height is  normal. The central canal neuroforamen are patent.    L4-L5: There is severe loss of disc height. There are reactive  discogenic endplate changes. There is broad-based disc osteophyte  complex with at least mild central canal stenosis. The neuroforamina  are mildly narrowed. There is bilateral facet osteophytic change    L3-L4: There is mild loss of disc height. There is a mild broad-based  disc bulge. The central canal neuroforamen are patent. There is  bilateral facet osteoarthritic change    L2-L3: There is moderate to severe loss of disc height. There is  posterior osteophytic ridging. The central canal is patent. The  neuroforamen are patent.    L1-L2: There is normal disc height. The central spinal canal and  neuroforamen are patent.       Impression    IMPRESSION: No acute appearing findings. There is no evidence of  fracture or subluxation.    Multilevel degenerative disks disease and facet osteoarthritic change  as described above    EARNEST ALEXANDER MD         SYSTEM ID:  [id number]      ED COURSE/ MDM/ ASSESSMENT/ PLAN:   Diagnostic Testing:  Diagnostic testing was conducted.  WBC of 9.7 with hemoglobin 13.4.  Electrolytes and renal functions benign.  Does have alkaline phosphatase of 166 with ALT of 37.  Lipase and UA negative.  The CT has been read by the radiologist with the interpretation of no acute intra-abdominal normal findings.  No acute fractures or subluxation present of the lumbar spine or intra-abdominal abnormalities.    Assessment:   Patient presents to the ER today right-sided back pain.  Upon arrival, vitals signs are normal.  Examination was completed.  The patient is alert but does appear painful upon arrival.  Does have muscle tension and tenderness to the right low back along with mid spinal tenderness of the lumbar spine.  CMS intact to all extremities.    Potential diagnosis which have been considered and evaluated include muscle strain, muscle tear, lumbar spine injury, as well as others. Many of these have been excluded using the various modalities and assessment as noted on the chart. At the present time, the diagnosis given seems to be the most likely low back strain.    ED Course/MDM/Plan:   Patient comes in with 1 weeks complaint of right low back pain.  This occurred after shoveling.  Getting worse with OTC therapy and chiropractics.  Patient has midline lumbar spinal tenderness to palpation along with paraspinal tenderness.  No radiation of pain down the legs.  No loss of bowel or bladder.  No red flag back pain abnormalities noted.  IV established and prochlorperazine 5 mg, ketorolac 15 mg, and hydromorphone 0.2 mg IV given with improvement of symptoms.  Due to the significance of pain, patient sent to CT for the lumbar spine and abdominal pelvis.  No abnormalities noted on this.  Patient is doing better but continues to have pain.  Likely has muscle strain/tear.  We will discharge patient home on Broadbent 5/325 x 10 tabs to use for severe pain.  Educated patient no  drinking alcohol or driving while on the medication due to sedating effects.  Did educate about addictive nature of the medication.  Otherwise massages, heat rubs, over-the-counter medications.  Advised patient to continue chiropractic treatments.  Follow-up with PCP for reevaluation.  Return to ER if any new or worsening symptoms.  Patient verbalized understand agrees with plan of care.  Patient discharged home with .      DIAGNOSIS:   (S39.012A) Low back strain, initial encounter        Critical Care Time: None     Impression and plan discussed with patient. Questions answered, concerns addressed, indications for urgent re-evaluation reviewed, and  given. Patient/Parent/Caregiver agree with treatment plan and have no further questions at this time.  AVS provided at discharge.    This note was created by the Dragon Voice Dictation System. Inadvertent typographical errors, due to software recognition problems, may still exist.      Golden DORAN, CNP  [facility]  EMERGENCY DEPARTMENT  [address] [place] [id number]  [phone]       Golden Thomason APRN CNP  04/23/23 1109

## 2023-04-23 NOTE — ED TRIAGE NOTES
62 y/o female presents with reports of right sided flank pain x 1 week. She denies urinary symptoms.

## 2023-04-23 NOTE — ED NOTES
Right lower back spasms, that started a week ago shoveling snow. Pt states she saw the chiropractor on Monday and Tuesday without relief, pt states it made it slightly worse even. Pt is jumping off of the bed with pressure on the right mid-lower back, pt states the pain wraps around her ribs on the right side to the front.

## 2023-04-24 ENCOUNTER — OFFICE VISIT (OUTPATIENT)
Dept: CHIROPRACTIC MEDICINE | Facility: OTHER | Age: 63
End: 2023-04-24
Payer: COMMERCIAL

## 2023-04-24 DIAGNOSIS — M99.02 SEGMENTAL AND SOMATIC DYSFUNCTION OF THORACIC REGION: ICD-10-CM

## 2023-04-24 DIAGNOSIS — M99.01 SEGMENTAL AND SOMATIC DYSFUNCTION OF CERVICAL REGION: ICD-10-CM

## 2023-04-24 DIAGNOSIS — M99.03 SEGMENTAL AND SOMATIC DYSFUNCTION OF LUMBAR REGION: Primary | ICD-10-CM

## 2023-04-24 DIAGNOSIS — M54.50 ACUTE MIDLINE LOW BACK PAIN WITHOUT SCIATICA: ICD-10-CM

## 2023-04-24 PROCEDURE — 98941 CHIROPRACT MANJ 3-4 REGIONS: CPT | Mod: AT | Performed by: CHIROPRACTOR

## 2023-04-24 NOTE — PROGRESS NOTES
Subjective Finding:    Chief compalint: Patient presents with:  Back Pain: Still having mid to low back pain.  Saw MD as well for the pain and given pain relieving meds    , Pain Scale: 4/10, Intensity: dull and ache, Duration: 10 days, Radiating: no.    Date of injury:     Activities that the pain restricts:   Home/household/hobbies/social activities: yes.  Work duties: no.  Sleep: no.  Makes symptoms better: rest.  Makes symptoms worse: activity and lumbar flexion.  Have you seen anyone else for the symptoms? no.  Work related: .  Automobile related injury: no.    Objective and Assessment:    Posture Analysis:   High shoulder: .  Head tilt: .  High iliac crest: .  Head carriage: neutral.  Thoracic Kyphosis: neutral.  Lumbar Lordosis: forward.    Lumbar Range of Motion: extension decreased, left lateral flexion decreased and right lateral flexion decreased.  Cervical Range of Motion: .  Thoracic Range of Motion: extension decreased.  Extremity Range of Motion: .    Palpation:   Subocc tightness    Segmental dysfunction pre-treatment and treatment area: T10  Left  L23 .  C1    Assessment post-treatment:  Cervical: .  Thoracic: ROM increased.  Lumbar: ROM increased and pain and tenderness decreased.    Comments: .      Complicating Factors: .    Plan / Procedure:      [address]  [facility]  T  L    Treatment plan: PRN.  Instructed patient: stretch as instructed at visit and walk 10 minutes.  Short term goals: increase ROM.  Long term goals: restore normal function.  Prognosis: good.

## 2023-04-27 ENCOUNTER — OFFICE VISIT (OUTPATIENT)
Dept: CHIROPRACTIC MEDICINE | Facility: OTHER | Age: 63
End: 2023-04-27
Attending: CHIROPRACTOR
Payer: COMMERCIAL

## 2023-04-27 DIAGNOSIS — M99.03 SEGMENTAL AND SOMATIC DYSFUNCTION OF LUMBAR REGION: ICD-10-CM

## 2023-04-27 DIAGNOSIS — M99.02 SEGMENTAL AND SOMATIC DYSFUNCTION OF THORACIC REGION: Primary | ICD-10-CM

## 2023-04-27 DIAGNOSIS — M99.01 SEGMENTAL AND SOMATIC DYSFUNCTION OF CERVICAL REGION: ICD-10-CM

## 2023-04-27 DIAGNOSIS — M54.50 ACUTE RIGHT-SIDED LOW BACK PAIN WITHOUT SCIATICA: ICD-10-CM

## 2023-04-27 PROCEDURE — 98941 CHIROPRACT MANJ 3-4 REGIONS: CPT | Mod: AT | Performed by: CHIROPRACTOR

## 2023-04-27 NOTE — PROGRESS NOTES
Subjective Finding:    Chief compalint: Patient presents with:  Back Pain: Sharp pain in right rib area    , Pain Scale: 4/10, Intensity: dull and ache, Duration: 12 days, Radiating: no.    Date of injury:     Activities that the pain restricts:   Home/household/hobbies/social activities: yes.  Work duties: no.  Sleep: no.  Makes symptoms better: rest.  Makes symptoms worse: activity and lumbar flexion.  Have you seen anyone else for the symptoms? no.  Work related: .  Automobile related injury: no.    Objective and Assessment:    Posture Analysis:   High shoulder: .  Head tilt: .  High iliac crest: .  Head carriage: neutral.  Thoracic Kyphosis: neutral.  Lumbar Lordosis: forward.    Lumbar Range of Motion: extension decreased, left lateral flexion decreased and right lateral flexion decreased.  Cervical Range of Motion: .  Thoracic Range of Motion: extension decreased.  Extremity Range of Motion: .    Palpation:   Subocc tightness    Segmental dysfunction pre-treatment and treatment area: T10 right with rib  Left  L23 .  C1    Assessment post-treatment:  Cervical: .  Thoracic: ROM increased.  Lumbar: ROM increased and pain and tenderness decreased.    Comments: .      Complicating Factors: .    Plan / Procedure:      [id number]  [facility]  T  L    Treatment plan: PRN.  Instructed patient: stretch as instructed at visit and walk 10 minutes.  Short term goals: increase ROM.  Long term goals: restore normal function.  Prognosis: good.

## 2023-05-01 ENCOUNTER — OFFICE VISIT (OUTPATIENT)
Dept: CHIROPRACTIC MEDICINE | Facility: OTHER | Age: 63
End: 2023-05-01
Attending: CHIROPRACTOR
Payer: COMMERCIAL

## 2023-05-01 DIAGNOSIS — M99.02 SEGMENTAL AND SOMATIC DYSFUNCTION OF THORACIC REGION: Primary | ICD-10-CM

## 2023-05-01 DIAGNOSIS — M54.50 ACUTE RIGHT-SIDED LOW BACK PAIN WITHOUT SCIATICA: ICD-10-CM

## 2023-05-01 DIAGNOSIS — M99.01 SEGMENTAL AND SOMATIC DYSFUNCTION OF CERVICAL REGION: ICD-10-CM

## 2023-05-01 DIAGNOSIS — M99.03 SEGMENTAL AND SOMATIC DYSFUNCTION OF LUMBAR REGION: ICD-10-CM

## 2023-05-01 PROCEDURE — 98941 CHIROPRACT MANJ 3-4 REGIONS: CPT | Mod: AT | Performed by: CHIROPRACTOR

## 2023-05-01 NOTE — PROGRESS NOTES
Subjective Finding:    Chief compalint: Patient presents with:  Back Pain: Right sided mid back and low back pain with rib involvement at times    , Pain Scale: 4/10, Intensity: dull and ache, Duration: 3 weeks, Radiating: no.    Date of injury:     Activities that the pain restricts:   Home/household/hobbies/social activities: yes.  Work duties: no.  Sleep: no.  Makes symptoms better: rest.  Makes symptoms worse: activity and lumbar flexion.  Have you seen anyone else for the symptoms? no.  Work related: .  Automobile related injury: no.    Objective and Assessment:    Posture Analysis:   High shoulder: .  Head tilt: .  High iliac crest: .  Head carriage: neutral.  Thoracic Kyphosis: neutral.  Lumbar Lordosis: forward.    Lumbar Range of Motion: extension decreased, left lateral flexion decreased and right lateral flexion decreased.  Cervical Range of Motion: .  Thoracic Range of Motion: extension decreased.  Extremity Range of Motion: .    Palpation:   Subocc tightness    Segmental dysfunction pre-treatment and treatment area: T10 right with rib  Left  L23 .  C1    Assessment post-treatment:  Cervical: .  Thoracic: ROM increased.  Lumbar: ROM increased and pain and tenderness decreased.    Comments: .      Complicating Factors: .    Plan / Procedure:      [id number]  [facility]  C  T  L    Treatment plan: PRN.  Instructed patient: stretch as instructed at visit and walk 10 minutes.  Short term goals: increase ROM.  Long term goals: restore normal function.  Prognosis: good.

## 2023-05-08 ENCOUNTER — HOSPITAL ENCOUNTER (EMERGENCY)
Facility: HOSPITAL | Age: 63
Discharge: HOME | End: 2023-05-08
Attending: NURSE PRACTITIONER | Admitting: NURSE PRACTITIONER
Payer: COMMERCIAL

## 2023-05-08 VITALS
SYSTOLIC BLOOD PRESSURE: 153 MMHG | DIASTOLIC BLOOD PRESSURE: 91 MMHG | TEMPERATURE: 96.9 F | HEART RATE: 93 BPM | RESPIRATION RATE: 16 BRPM | OXYGEN SATURATION: 96 %

## 2023-05-08 DIAGNOSIS — N39.0 URINARY TRACT INFECTION WITHOUT HEMATURIA, SITE UNSPECIFIED: ICD-10-CM

## 2023-05-08 DIAGNOSIS — M54.50 LOW BACK PAIN, UNSPECIFIED BACK PAIN LATERALITY, UNSPECIFIED CHRONICITY, UNSPECIFIED WHETHER SCIATICA PRESENT: ICD-10-CM

## 2023-05-08 DIAGNOSIS — N39.0 URINARY TRACT INFECTION: ICD-10-CM

## 2023-05-08 DIAGNOSIS — M54.50 LOWER BACK PAIN: ICD-10-CM

## 2023-05-08 LAB
ALBUMIN UR-MCNC: 50 MG/DL
APPEARANCE UR: ABNORMAL
BILIRUB UR QL STRIP: NEGATIVE
COLOR UR AUTO: YELLOW
GLUCOSE UR STRIP-MCNC: NEGATIVE MG/DL
HGB UR QL STRIP: NEGATIVE
HYALINE CASTS: 5 /LPF
KETONES UR STRIP-MCNC: ABNORMAL MG/DL
LEUKOCYTE ESTERASE UR QL STRIP: ABNORMAL
MUCOUS THREADS #/AREA URNS LPF: PRESENT /LPF
NITRATE UR QL: NEGATIVE
PH UR STRIP: 6 [PH] (ref 4.7–8)
RBC URINE: 23 /HPF
SP GR UR STRIP: 1.03 (ref 1–1.03)
SQUAMOUS EPITHELIAL: 3 /HPF
UROBILINOGEN UR STRIP-MCNC: 3 MG/DL
WBC URINE: 13 /HPF

## 2023-05-08 PROCEDURE — 99213 OFFICE O/P EST LOW 20 MIN: CPT | Performed by: NURSE PRACTITIONER

## 2023-05-08 PROCEDURE — G0463 HOSPITAL OUTPT CLINIC VISIT: HCPCS

## 2023-05-08 PROCEDURE — 87086 URINE CULTURE/COLONY COUNT: CPT | Performed by: NURSE PRACTITIONER

## 2023-05-08 PROCEDURE — 81001 URINALYSIS AUTO W/SCOPE: CPT | Performed by: NURSE PRACTITIONER

## 2023-05-08 RX ORDER — ATORVASTATIN CALCIUM 80 MG/1
80 TABLET, FILM COATED ORAL DAILY
COMMUNITY

## 2023-05-08 RX ORDER — CEPHALEXIN 500 MG/1
500 CAPSULE ORAL 2 TIMES DAILY
Qty: 14 CAPSULE | Refills: 0 | Status: SHIPPED | OUTPATIENT
Start: 2023-05-08 | End: 2023-05-15

## 2023-05-08 RX ORDER — BUDESONIDE AND FORMOTEROL FUMARATE DIHYDRATE 160; 4.5 UG/1; UG/1
2 AEROSOL RESPIRATORY (INHALATION) 2 TIMES DAILY
COMMUNITY
Start: 2023-03-01

## 2023-05-08 RX ORDER — PREDNISONE 20 MG/1
TABLET ORAL
Qty: 10 TABLET | Refills: 0 | Status: SHIPPED | OUTPATIENT
Start: 2023-05-08 | End: 2023-11-28

## 2023-05-08 ASSESSMENT — ENCOUNTER SYMPTOMS
BACK PAIN: 1
VOMITING: 0
NECK STIFFNESS: 0
DYSURIA: 0
ABDOMINAL PAIN: 0
MYALGIAS: 1
NAUSEA: 0
PSYCHIATRIC NEGATIVE: 1
FEVER: 0
CHILLS: 0
SHORTNESS OF BREATH: 0
FREQUENCY: 0
HEMATURIA: 0
NECK PAIN: 0
DIARRHEA: 0

## 2023-05-08 ASSESSMENT — ACTIVITIES OF DAILY LIVING (ADL): ADLS_ACUITY_SCORE: 33

## 2023-05-08 NOTE — ED TRIAGE NOTES
R side back pain, from hips to ribs, 8/10 ongoing for over a month. Sees chiro, has not helped, has been getting worse. Cannot get in to PCP ([facility])  Not sure if she took any ibu or tylenol today.  Took 2 Asprin, has helped but has maxed out on dose for the day. Sleeps with heat pack, lido patch, uses ice packs, does not help.    Jessa Behrman, MARIAHN

## 2023-05-08 NOTE — ED PROVIDER NOTES
History     Chief Complaint   Patient presents with     Back Pain     HPI  Niesha Lyle is a 63 year old female who presents to urgent care today (ambulatory) with complaints of right lower back pain, does not radiate.  Patient was seen on 4/23/2023 for the same back pain and had CT lumbar spine completed which showed no acute findings.  No evidence of fracture or subluxation.  Multilevel degenerative disc disease and facet osteoarthritic changes.  CT abdomen/pelvis was completed which shows no acute intra-abdominal findings.  Denies any abdominal pain.  Denies any new fall, injury or trauma.  Denies any dysuria, frequency or hematuria.  Denies any bowel or bladder dysfunction.  Denies any saddle anesthesia.  Denies any numbness or weakness of bilateral lower extremities not attributed to pain.  Denies any fever, chills, nausea, vomiting, diarrhea, shortness of breath or chest pain.  Seeing Dr. Gonsalo Hayes for her neck, no new concerns with neck pain.  Has been taking Tylenol, ibuprofen, Norco and tizanidine for pain.  No other concerns.    Allergies:  Allergies   Allergen Reactions     Lisinopril        Problem List:    Patient Active Problem List    Diagnosis Date Noted     Raynaud's disease 05/04/2010     Priority: Medium     Overview:   Borderline positive SAVITA       Osteopenia 08/20/2008     Priority: Medium     Thoracic or lumbosacral neuritis or radiculitis 08/20/2008     Priority: Medium     Overview:   IMO Update 10/11       Sciatica 08/13/2008     Priority: Medium     Restless legs syndrome (RLS) 02/26/2007     Priority: Medium     Overview:   IMO Update 10/11       Migraine without intractable migraine 08/02/2006     Priority: Medium     Overview:   IMO Update 10/11       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    Past Surgical History:    No past surgical history on file.    Family History:    No family history on file.    Social History:  Marital Status:   [2]  Social History     Tobacco Use     Smoking status: Never     Smokeless tobacco: Never   Substance Use Topics     Alcohol use: No     Drug use: No        Medications:    acetaminophen (TYLENOL) 500 MG tablet  albuterol (PROAIR HFA/PROVENTIL HFA/VENTOLIN HFA) 108 (90 Base) MCG/ACT inhaler  Aspirin Buf,CaCarb-MgCarb-MgO, 81 MG TABS  atenolol (TENORMIN) 25 MG tablet  atorvastatin (LIPITOR) 80 MG tablet  budesonide-formoterol (SYMBICORT) 160-4.5 MCG/ACT Inhaler  Calcium Carbonate-Vitamin D (CALCIUM + D PO)  cephALEXin (KEFLEX) 500 MG capsule  DULoxetine (CYMBALTA) 60 MG capsule  fluticasone-vilanterol (BREO ELLIPTA) 200-25 MCG/INH inhaler  ibuprofen (ADVIL/MOTRIN) 200 MG tablet  omeprazole (PRILOSEC) 40 MG DR capsule  predniSONE (DELTASONE) 20 MG tablet  tiZANidine (ZANAFLEX) 4 MG capsule  traZODone (DESYREL) 100 MG tablet      Review of Systems   Constitutional: Negative for chills and fever.   Respiratory: Negative for shortness of breath.    Cardiovascular: Negative for chest pain.   Gastrointestinal: Negative for abdominal pain, diarrhea, nausea and vomiting.   Genitourinary: Negative for dysuria, frequency and hematuria.   Musculoskeletal: Positive for back pain and myalgias. Negative for gait problem, neck pain and neck stiffness.   Skin: Negative.    Psychiatric/Behavioral: Negative.      Physical Exam   BP: 153/91  Pulse: 93  Temp: 96.9  F (36.1  C)  Resp: 16  SpO2: 96 %    Physical Exam  Vitals and nursing note reviewed.   Constitutional:       General: She is not in acute distress.     Appearance: Normal appearance. She is not ill-appearing or toxic-appearing.   Cardiovascular:      Rate and Rhythm: Normal rate and regular rhythm.      Pulses: Normal pulses.      Heart sounds: Normal heart sounds.   Pulmonary:      Effort: Pulmonary  effort is normal.      Breath sounds: Normal breath sounds.   Abdominal:      General: Bowel sounds are normal.      Palpations: Abdomen is soft.      Tenderness: There is no abdominal tenderness. There is no right CVA tenderness or left CVA tenderness.   Musculoskeletal:      Cervical back: Normal.      Thoracic back: Normal.      Lumbar back: Spasms and tenderness present. No swelling, edema, deformity, signs of trauma, lacerations or bony tenderness. Normal range of motion.   Neurological:      Mental Status: She is alert.   Psychiatric:         Mood and Affect: Mood normal.       ED Course     Results for orders placed or performed during the hospital encounter of 05/08/23 (from the past 24 hour(s))   UA Macroscopic with reflex to Microscopic and Culture    Specimen: Urine, Clean Catch   Result Value Ref Range    Color Urine Yellow Colorless, Straw, Light Yellow, Yellow    Appearance Urine Slightly Cloudy (A) Clear    Glucose Urine Negative Negative mg/dL    Bilirubin Urine Negative Negative    Ketones Urine Trace (A) Negative mg/dL    Specific Gravity Urine 1.034 1.003 - 1.035    Blood Urine Negative Negative    pH Urine 6.0 4.7 - 8.0    Protein Albumin Urine 50 (A) Negative mg/dL    Urobilinogen Urine 3.0 (A) Normal, 2.0 mg/dL    Nitrite Urine Negative Negative    Leukocyte Esterase Urine Moderate (A) Negative    Mucus Urine Present (A) None Seen /LPF    RBC Urine 23 (H) <=2 /HPF    WBC Urine 13 (H) <=5 /HPF    Squamous Epithelials Urine 3 (H) <=1 /HPF    Hyaline Casts Urine 5 (H) <=2 /LPF    Narrative    Urine Culture ordered based on laboratory criteria       Medications - No data to display    Assessments & Plan (with Medical Decision Making)     I have reviewed the nursing notes.    I have reviewed the findings, diagnosis, plan and need for follow up with the patient.  (M54.50) Lower back pain  (N39.0) Urinary tract infection  Plan:   Patient ambulatory with a nontoxic appearance.  Patient arrived with  complaints of right lower back pain which has been ongoing for the past 3+ weeks.  Denies any new injury or trauma after being seen on 4/23/2023 where patient had an extensive work-up.  No spinal tenderness upon palpation.  Denies any dysuria, frequency or hematuria.  No abdominal tenderness or CVA tenderness.  CT abdomen pelvis completed during 4/23/2023 visit which shows no acute intra-abdominal findings.  UA completed which shows questionable signs of urinary tract infection, will treat with cephalexin at this time and urine culture started.  No signs of pyelonephritis or kidney stone at this time given previous CT scan and symptoms.  No hematuria present, did offer repeat imaging for kidney stones at this time and patient currently declines.  CT scan from 4/23/2023 shows no acute appearing findings.  No evidence of fracture or subluxation.  Multilevel degenerative disc disease with facet osteoarthritic changes.  Patient has tried Tylenol, ibuprofen, Norco and tizanidine without relief.  No red flag symptoms of back pain.  Patient has not been on prednisone in the past 5 years, will attempt short course of prednisone for back pain given the multilevel degenerative disc disease with facet osteoarthritis changes.  Patient has a follow-up appointment with PCP on Thursday, patient to go to appointment as scheduled.  Patient to return to urgent care/ED with any worsening in condition or additional concerns.  Patient is in agreement with treatment plan.    Discharge Medication List as of 5/8/2023 10:53 AM      START taking these medications    Details   cephALEXin (KEFLEX) 500 MG capsule Take 1 capsule (500 mg) by mouth 2 times daily for 7 days, Disp-14 capsule, R-0, E-Prescribe      predniSONE (DELTASONE) 20 MG tablet Take two tablets (= 40mg) each day for 5 (five) days, Disp-10 tablet, R-0, E-Prescribe           Final diagnoses:   Lower back pain   Urinary tract infection     5/8/2023   [facility]     Fran  Michelle LEVY NP  05/08/23 131

## 2023-05-08 NOTE — DISCHARGE INSTRUCTIONS
Go to your scheduled appointment with Venus Donovan Thursday    Prednisone as ordered    Cephalexin as ordered  - Take entire course of antibiotic even if you start to feel better.  - Antibiotics can cause stomach upset including nausea and diarrhea. Read your bottle or ask the pharmacist if antibiotic can be taken with food to help prevent nausea. If you have symptoms of diarrhea you can take an over-the-counter probiotic and/or increase foods with probiotics such as yogurt, Mikey, sauerkraut.    Rest as needed  Gentle stretching  Alternate ice and heat  Over-the-counter topical anesthetics such as Bengay, IcyHot, Biofreeze or Lidoderm patches as needed  Return to urgent care/ED with any worsening in condition or additional concerns.

## 2023-05-08 NOTE — ED TRIAGE NOTES
"C/o right side lower back pain from bottom of rib cage to right hip for 1.5 months. States she was seen previously for this and had a CT to rule out kidney stone which was negative for stone. States \"they didn't figure out what was wrong and the pain is still there.\" Rates pain 8/10. Denies taking any pain medication.      Triage Assessment     Row Name 05/08/23 0859       Triage Assessment (Adult)    Airway WDL WDL       Respiratory WDL    Respiratory WDL WDL              "

## 2023-05-10 LAB — BACTERIA UR CULT: NORMAL

## 2023-05-19 ENCOUNTER — HOSPITAL ENCOUNTER (EMERGENCY)
Facility: HOSPITAL | Age: 63
Discharge: HOME | End: 2023-05-19
Payer: COMMERCIAL

## 2023-05-19 VITALS
WEIGHT: 167.55 LBS | DIASTOLIC BLOOD PRESSURE: 88 MMHG | BODY MASS INDEX: 27.92 KG/M2 | HEART RATE: 85 BPM | OXYGEN SATURATION: 100 % | HEIGHT: 65 IN | TEMPERATURE: 97.4 F | RESPIRATION RATE: 18 BRPM | SYSTOLIC BLOOD PRESSURE: 154 MMHG

## 2023-05-19 DIAGNOSIS — M54.9 BACK PAIN: ICD-10-CM

## 2023-05-19 PROCEDURE — 250N000011 HC RX IP 250 OP 636

## 2023-05-19 PROCEDURE — 96372 THER/PROPH/DIAG INJ SC/IM: CPT

## 2023-05-19 PROCEDURE — G0463 HOSPITAL OUTPT CLINIC VISIT: HCPCS | Mod: 25

## 2023-05-19 PROCEDURE — 99213 OFFICE O/P EST LOW 20 MIN: CPT

## 2023-05-19 RX ORDER — KETOROLAC TROMETHAMINE 30 MG/ML
30 INJECTION, SOLUTION INTRAMUSCULAR; INTRAVENOUS ONCE
Status: COMPLETED | OUTPATIENT
Start: 2023-05-19 | End: 2023-05-19

## 2023-05-19 RX ADMIN — KETOROLAC TROMETHAMINE 30 MG: 30 INJECTION, SOLUTION INTRAMUSCULAR; INTRAVENOUS at 10:26

## 2023-05-19 ASSESSMENT — ENCOUNTER SYMPTOMS
HEMATURIA: 0
DIFFICULTY URINATING: 0
VOMITING: 0
DYSURIA: 0
NECK STIFFNESS: 0
BACK PAIN: 1
FREQUENCY: 0
COUGH: 0
NECK PAIN: 1
SHORTNESS OF BREATH: 0
ABDOMINAL PAIN: 0
DIZZINESS: 0
HEADACHES: 0
FEVER: 0
COLOR CHANGE: 0
MYALGIAS: 0
DIARRHEA: 0
ARTHRALGIAS: 1
NAUSEA: 0
APPETITE CHANGE: 0
CHILLS: 0
ACTIVITY CHANGE: 1

## 2023-05-19 NOTE — ED TRIAGE NOTES
Pt presents with c/o right lateral back pain from mid to lower back. Pain started 6 weeks ago after shoveling snow and her dog pulled grabbed shovel and pulled down. Has been seen by MD and has had multiple images and labs that have all been negative. Pt has been taking aspirin.     Triage Assessment     Row Name 05/19/23 0940       Triage Assessment (Adult)    Airway WDL WDL       Respiratory WDL    Respiratory WDL WDL       Skin Circulation/Temperature WDL    Skin Circulation/Temperature WDL WDL       Cardiac WDL    Cardiac WDL WDL       Peripheral/Neurovascular WDL    Peripheral Neurovascular WDL WDL       Cognitive/Neuro/Behavioral WDL    Cognitive/Neuro/Behavioral WDL WDL

## 2023-05-19 NOTE — ED PROVIDER NOTES
History     Chief Complaint   Patient presents with     Back Pain     HPI  Niesha Lyle is a 63 year old female who presents to the urgent care with a six week history of mid to lower back pain. Pain started after her dog jumped on a shovel of heavy snow she was lifting. She had a lumbar CT on 4/23 with no acute appearing findings and no evidence of fracture or subluxation. There were findings of multilevel degenerative disk disease and facet osteoarthritic changes, per radiologist. She was seen in the UC on 5/8. Dx with a mild UTI at that time. She completed the abx on 5/15. She denies dysuria, hematuria, fevers, chills, n/v/d, abd pain, shortness of breath, and chest pain. She also denies saddle paraesthesias and loss of bowel/bladder. She has taken Shelia back and body with some decrease in pain.     Allergies:  Allergies   Allergen Reactions     Lisinopril      Morphine        Problem List:    Patient Active Problem List    Diagnosis Date Noted     Raynaud's disease 05/04/2010     Priority: Medium     Overview:   Borderline positive SAVITA       Osteopenia 08/20/2008     Priority: Medium     Thoracic or lumbosacral neuritis or radiculitis 08/20/2008     Priority: Medium     Overview:   IMO Update 10/11       Sciatica 08/13/2008     Priority: Medium     Restless legs syndrome (RLS) 02/26/2007     Priority: Medium     Overview:   IMO Update 10/11       Migraine without intractable migraine 08/02/2006     Priority: Medium     Overview:   IMO Update 10/11       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    Past Surgical History:    No past surgical history on file.    Family History:    No family history on file.    Social History:  Marital Status:   [2]  Social History     Tobacco Use     Smoking status:  "Never     Smokeless tobacco: Never   Substance Use Topics     Alcohol use: No     Drug use: No        Medications:    acetaminophen (TYLENOL) 500 MG tablet  albuterol (PROAIR HFA/PROVENTIL HFA/VENTOLIN HFA) 108 (90 Base) MCG/ACT inhaler  Aspirin Buf,CaCarb-MgCarb-MgO, 81 MG TABS  atenolol (TENORMIN) 25 MG tablet  atorvastatin (LIPITOR) 80 MG tablet  budesonide-formoterol (SYMBICORT) 160-4.5 MCG/ACT Inhaler  Calcium Carbonate-Vitamin D (CALCIUM + D PO)  DULoxetine (CYMBALTA) 60 MG capsule  fluticasone-vilanterol (BREO ELLIPTA) 200-25 MCG/INH inhaler  ibuprofen (ADVIL/MOTRIN) 200 MG tablet  omeprazole (PRILOSEC) 40 MG DR capsule  predniSONE (DELTASONE) 20 MG tablet  tiZANidine (ZANAFLEX) 4 MG capsule  traZODone (DESYREL) 100 MG tablet          Review of Systems   Constitutional: Positive for activity change. Negative for appetite change, chills and fever.   Respiratory: Negative for cough and shortness of breath.    Cardiovascular: Negative for chest pain.   Gastrointestinal: Negative for abdominal pain, diarrhea, nausea and vomiting.   Genitourinary: Negative for difficulty urinating, dysuria, frequency, hematuria and urgency.   Musculoskeletal: Positive for arthralgias, back pain and neck pain. Negative for gait problem, myalgias and neck stiffness.   Skin: Negative for color change.   Neurological: Negative for dizziness and headaches.   All other systems reviewed and are negative.      Physical Exam   BP: 175/89  Pulse: 85  Temp: 97.4  F (36.3  C)  Resp: 18  Height: 165.1 cm (5' 5\")  Weight: 76 kg (167 lb 8.8 oz)  SpO2: 100 %      Physical Exam  Vitals and nursing note reviewed.   Constitutional:       General: She is in acute distress.      Appearance: Normal appearance. She is not ill-appearing.   Cardiovascular:      Rate and Rhythm: Normal rate and regular rhythm.      Pulses: Normal pulses.      Heart sounds: Normal heart sounds. No murmur heard.  Pulmonary:      Effort: Pulmonary effort is normal. No " respiratory distress.      Breath sounds: Normal breath sounds. No stridor. No wheezing or rhonchi.   Musculoskeletal:         General: Tenderness present. No swelling, deformity or signs of injury.      Cervical back: Normal range of motion and neck supple. Bony tenderness present. No swelling, edema, deformity, erythema, rigidity or tenderness.      Thoracic back: Tenderness and bony tenderness present. No swelling, edema, deformity or signs of trauma. Decreased range of motion.      Lumbar back: Tenderness and bony tenderness present. No swelling, edema, deformity or signs of trauma. Decreased range of motion. Negative right straight leg raise test and negative left straight leg raise test.        Back:       Comments: Tenderness    Skin:     General: Skin is warm and dry.      Capillary Refill: Capillary refill takes less than 2 seconds.      Coloration: Skin is not pale.      Findings: No bruising.   Neurological:      General: No focal deficit present.      Mental Status: She is alert and oriented to person, place, and time.      Motor: No weakness.         ED Course                 Procedures             No results found for this or any previous visit (from the past 24 hour(s)).    Medications   ketorolac (TORADOL) injection 30 mg (has no administration in time range)       Assessments & Plan (with Medical Decision Making)     I have reviewed the nursing notes.    I have reviewed the findings, diagnosis, plan and need for follow up with the patient.  Niesha Lyle is a 63 year old female who presents to the urgent care with a six week history of mid to lower back pain. Pain started after her dog jumped on a shovel of heavy snow she was lifting. She had a lumbar CT on 4/23 with no acute appearing findings and no evidence of fracture or subluxation. There were findings of multilevel degenerative disk disease and facet osteoarthritic changes, per radiologist. She was seen in the  on 5/8. Dx with a mild  UTI at that time. She completed the abx on 5/15. She denies dysuria, hematuria, fevers, chills, n/v/d, abd pain, shortness of breath, and chest pain. She also denies saddle paraesthesias and loss of bowel/bladder. She has taken Shelia back and body with some decrease in pain.     MDM: VSS and afebrile. Lungs clear, heart tones regular. Spinal tenderness to cervical, thoracic, and lumbar spine. No step offs or edema. She does have some right CVA tenderness but declines a UA today as she does not have any urinary symptoms and this is not new. No saddle paraesthesias or bowel/bladder concerns. Less suspicious for cauda equina. Able to bear weight and ambulate. There are no injuries since her last CT. Discussed re-imaging with mutual decisions to not perform a CT today. Ultimately, she needs an MRI of her back. She has made attempts to see her PCP without success. At time of discharge she is going to go to [facility] and see if she can speak with someone about having and MRI ordered. A toradol injection was given in UC at time of discharge. She has tolerated this in the past. She has been taking Shelia back and body. Discussed stopping this and taking tylenol and ibuprofen instead.     (M54.9) Back pain  Plan: tylenol and ibuprofen as needed for pain. Ice or heat for 15-20 minutes every 2-3 hours. Return with any saddle paraesthesias, loss of bowel/bladder, fevers, chills, shortness of breath, or concerns. Follow up with PCP. Understanding verbalized.               New Prescriptions    No medications on file       Final diagnoses:   Back pain       5/19/2023   [facility]     Roseanna Fletcher NP  05/19/23 1041

## 2023-05-19 NOTE — ED TRIAGE NOTES
Injured back 6 weeks ago shoveling snow,dog grabbed shovel and pulled her down. Patient has been seen by MD had CT,kidney and bladder tests. All have been negative. Comes in with right lateral back pain from mid thoracic to lumbar area. Denies numbness or tinge ling in legs     Triage Assessment     Row Name 05/19/23 0940       Triage Assessment (Adult)    Airway WDL WDL       Respiratory WDL    Respiratory WDL WDL       Skin Circulation/Temperature WDL    Skin Circulation/Temperature WDL WDL       Cardiac WDL    Cardiac WDL WDL       Peripheral/Neurovascular WDL    Peripheral Neurovascular WDL WDL       Cognitive/Neuro/Behavioral WDL    Cognitive/Neuro/Behavioral WDL WDL

## 2023-05-19 NOTE — DISCHARGE INSTRUCTIONS
Tylenol 1000mg every 6 hours, max dose of 4000mg in 24 hours     Ibuprofen 600-800mg every 8 hours, max dose of 2400mg in 24 hours. Please make sure to eat with this.     You can try ice or heat for 15-20 minutes every 2-3 hours.     Please see you PCP to have an MRI ordered.     Return with any numbness to your buttocks, loss of bowel/bladder or concerns.

## 2023-08-07 ENCOUNTER — HOSPITAL ENCOUNTER (EMERGENCY)
Facility: HOSPITAL | Age: 63
Discharge: HOME | End: 2023-08-07
Attending: PHYSICIAN ASSISTANT | Admitting: PHYSICIAN ASSISTANT
Payer: COMMERCIAL

## 2023-08-07 VITALS
HEART RATE: 60 BPM | RESPIRATION RATE: 20 BRPM | SYSTOLIC BLOOD PRESSURE: 191 MMHG | TEMPERATURE: 98.8 F | OXYGEN SATURATION: 98 % | DIASTOLIC BLOOD PRESSURE: 103 MMHG

## 2023-08-07 DIAGNOSIS — K52.9 GASTROENTERITIS: ICD-10-CM

## 2023-08-07 LAB
ADV 40+41 DNA STL QL NAA+NON-PROBE: NEGATIVE
ANION GAP SERPL CALCULATED.3IONS-SCNC: 14 MMOL/L (ref 7–15)
ASTRO TYP 1-8 RNA STL QL NAA+NON-PROBE: NEGATIVE
BASOPHILS # BLD AUTO: 0.1 10E3/UL (ref 0–0.2)
BASOPHILS NFR BLD AUTO: 1 %
BUN SERPL-MCNC: 10.8 MG/DL (ref 8–23)
C CAYETANENSIS DNA STL QL NAA+NON-PROBE: NEGATIVE
CALCIUM SERPL-MCNC: 9.5 MG/DL (ref 8.8–10.2)
CAMPYLOBACTER DNA SPEC NAA+PROBE: NEGATIVE
CHLORIDE SERPL-SCNC: 104 MMOL/L (ref 98–107)
CREAT SERPL-MCNC: 0.87 MG/DL (ref 0.51–0.95)
CRYPTOSP DNA STL QL NAA+NON-PROBE: NEGATIVE
DEPRECATED HCO3 PLAS-SCNC: 26 MMOL/L (ref 22–29)
E COLI O157 DNA STL QL NAA+NON-PROBE: ABNORMAL
E HISTOLYT DNA STL QL NAA+NON-PROBE: NEGATIVE
EAEC ASTA GENE ISLT QL NAA+PROBE: NEGATIVE
EC STX1+STX2 GENES STL QL NAA+NON-PROBE: NEGATIVE
EOSINOPHIL # BLD AUTO: 0.2 10E3/UL (ref 0–0.7)
EOSINOPHIL NFR BLD AUTO: 1 %
EPEC EAE GENE STL QL NAA+NON-PROBE: POSITIVE
ERYTHROCYTE [DISTWIDTH] IN BLOOD BY AUTOMATED COUNT: 12.6 % (ref 10–15)
ETEC LTA+ST1A+ST1B TOX ST NAA+NON-PROBE: NEGATIVE
G LAMBLIA DNA STL QL NAA+NON-PROBE: NEGATIVE
GFR SERPL CREATININE-BSD FRML MDRD: 74 ML/MIN/1.73M2
GLUCOSE SERPL-MCNC: 103 MG/DL (ref 70–99)
HCT VFR BLD AUTO: 42.4 % (ref 35–47)
HGB BLD-MCNC: 13.5 G/DL (ref 11.7–15.7)
IMM GRANULOCYTES # BLD: 0 10E3/UL
IMM GRANULOCYTES NFR BLD: 0 %
LYMPHOCYTES # BLD AUTO: 2.3 10E3/UL (ref 0.8–5.3)
LYMPHOCYTES NFR BLD AUTO: 21 %
MAGNESIUM SERPL-MCNC: 1.9 MG/DL (ref 1.7–2.3)
MCH RBC QN AUTO: 28.7 PG (ref 26.5–33)
MCHC RBC AUTO-ENTMCNC: 31.8 G/DL (ref 31.5–36.5)
MCV RBC AUTO: 90 FL (ref 78–100)
MONOCYTES # BLD AUTO: 0.7 10E3/UL (ref 0–1.3)
MONOCYTES NFR BLD AUTO: 7 %
NEUTROPHILS # BLD AUTO: 7.8 10E3/UL (ref 1.6–8.3)
NEUTROPHILS NFR BLD AUTO: 70 %
NOROVIRUS GI+II RNA STL QL NAA+NON-PROBE: NEGATIVE
NRBC # BLD AUTO: 0 10E3/UL
NRBC BLD AUTO-RTO: 0 /100
P SHIGELLOIDES DNA STL QL NAA+NON-PROBE: NEGATIVE
PLATELET # BLD AUTO: 274 10E3/UL (ref 150–450)
POTASSIUM SERPL-SCNC: 3.5 MMOL/L (ref 3.4–5.3)
RBC # BLD AUTO: 4.7 10E6/UL (ref 3.8–5.2)
RVA RNA STL QL NAA+NON-PROBE: NEGATIVE
SALMONELLA SP RPOD STL QL NAA+PROBE: NEGATIVE
SAPO I+II+IV+V RNA STL QL NAA+NON-PROBE: NEGATIVE
SHIGELLA SP+EIEC IPAH ST NAA+NON-PROBE: NEGATIVE
SODIUM SERPL-SCNC: 144 MMOL/L (ref 136–145)
V CHOLERAE DNA SPEC QL NAA+PROBE: NEGATIVE
VIBRIO DNA SPEC NAA+PROBE: NEGATIVE
WBC # BLD AUTO: 11 10E3/UL (ref 4–11)
Y ENTEROCOL DNA STL QL NAA+PROBE: NEGATIVE

## 2023-08-07 PROCEDURE — 83735 ASSAY OF MAGNESIUM: CPT | Performed by: PHYSICIAN ASSISTANT

## 2023-08-07 PROCEDURE — 85025 COMPLETE CBC W/AUTO DIFF WBC: CPT | Performed by: PHYSICIAN ASSISTANT

## 2023-08-07 PROCEDURE — 82374 ASSAY BLOOD CARBON DIOXIDE: CPT | Performed by: PHYSICIAN ASSISTANT

## 2023-08-07 PROCEDURE — 250N000013 HC RX MED GY IP 250 OP 250 PS 637: Performed by: PHYSICIAN ASSISTANT

## 2023-08-07 PROCEDURE — 99284 EMERGENCY DEPT VISIT MOD MDM: CPT | Performed by: PHYSICIAN ASSISTANT

## 2023-08-07 PROCEDURE — 87507 IADNA-DNA/RNA PROBE TQ 12-25: CPT | Mod: GZ | Performed by: PHYSICIAN ASSISTANT

## 2023-08-07 PROCEDURE — 36415 COLL VENOUS BLD VENIPUNCTURE: CPT | Performed by: PHYSICIAN ASSISTANT

## 2023-08-07 PROCEDURE — 82310 ASSAY OF CALCIUM: CPT | Performed by: PHYSICIAN ASSISTANT

## 2023-08-07 PROCEDURE — 99283 EMERGENCY DEPT VISIT LOW MDM: CPT

## 2023-08-07 RX ORDER — ATENOLOL 50 MG/1
50 TABLET ORAL ONCE
Status: COMPLETED | OUTPATIENT
Start: 2023-08-07 | End: 2023-08-07

## 2023-08-07 RX ADMIN — ATENOLOL 50 MG: 50 TABLET ORAL at 12:12

## 2023-08-07 ASSESSMENT — ENCOUNTER SYMPTOMS
FATIGUE: 0
FEVER: 0
HEADACHES: 0
DYSURIA: 0
BLOOD IN STOOL: 0
WOUND: 0
DIARRHEA: 1
NAUSEA: 0
ABDOMINAL DISTENTION: 0
MYALGIAS: 0
VOMITING: 0
ABDOMINAL PAIN: 0
FLANK PAIN: 0
DIZZINESS: 0
CHILLS: 0

## 2023-08-07 ASSESSMENT — ACTIVITIES OF DAILY LIVING (ADL): ADLS_ACUITY_SCORE: 35

## 2023-08-07 NOTE — ED TRIAGE NOTES
"\"Having diarrhea since this past Thursday.  I am scheduled for neck surgery tomorrow in [place] so I need to know what is going on.\"         "

## 2023-08-07 NOTE — ED NOTES
Pt states she is having neck surgery tomorrow, and this diarrhea has been going since last Thursday, pt is very nervous about it. Pt states she has been very busy planning and getting things ready, states that she has already had to cancel it once due to something else. Pt is anxious and states that it very well could be nerves. Pt states that the diarrhea mainly happens about 40 minutes after she eats, its yellow and acidic. Pt states that she just wants to know if she can have surgery tomorrow, she has to be there at 0600.

## 2023-08-07 NOTE — DISCHARGE INSTRUCTIONS
Return to ER if you develop abdominal pain, bloody stools, melena, severe headaches, dizziness, or severe vomiting or diarrhea.

## 2023-08-07 NOTE — ED PROVIDER NOTES
History     Chief Complaint   Patient presents with    Diarrhea     HPI  Niesha Lyle is a 63 year old female who presents to the emergency room due to diarrhea, onset x4 days ago.  Increased after eating.  Reports approximately 5 episodes per day, although 1 day was more frequent.  Denies any new food or water source.  No other systemic illnesses.  No real abdominal pain, occasional mild cramping.  Of note, does have significant stress in her life with 's health issues, home projects, and upcoming C-spine surgery scheduled tomorrow.  She is here to wonder if she needs to cancel her surgery due to symptoms.    Allergies:  Allergies   Allergen Reactions    Lisinopril     Morphine        Problem List:    Patient Active Problem List    Diagnosis Date Noted    Raynaud's disease 05/04/2010     Priority: Medium     Overview:   Borderline positive SAVITA      Osteopenia 08/20/2008     Priority: Medium    Thoracic or lumbosacral neuritis or radiculitis 08/20/2008     Priority: Medium     Overview:   IMO Update 10/11      Sciatica 08/13/2008     Priority: Medium    Restless legs syndrome (RLS) 02/26/2007     Priority: Medium     Overview:   IMO Update 10/11      Migraine without intractable migraine 08/02/2006     Priority: Medium     Overview:   IMO Update 10/11      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    Past Surgical History:    No past surgical history on file.    Family History:    No family history on file.    Social History:  Marital Status:   [2]  Social History     Tobacco Use    Smoking status: Never    Smokeless tobacco: Never   Substance Use Topics    Alcohol use: No    Drug use: No        Medications:    acetaminophen (TYLENOL) 500 MG tablet  albuterol (PROAIR HFA/PROVENTIL HFA/VENTOLIN  HFA) 108 (90 Base) MCG/ACT inhaler  Aspirin Buf,CaCarb-MgCarb-MgO, 81 MG TABS  atenolol (TENORMIN) 25 MG tablet  atorvastatin (LIPITOR) 80 MG tablet  budesonide-formoterol (SYMBICORT) 160-4.5 MCG/ACT Inhaler  Calcium Carbonate-Vitamin D (CALCIUM + D PO)  DULoxetine (CYMBALTA) 60 MG capsule  fluticasone-vilanterol (BREO ELLIPTA) 200-25 MCG/INH inhaler  ibuprofen (ADVIL/MOTRIN) 200 MG tablet  omeprazole (PRILOSEC) 40 MG DR capsule  predniSONE (DELTASONE) 20 MG tablet  tiZANidine (ZANAFLEX) 4 MG capsule  traZODone (DESYREL) 100 MG tablet          Review of Systems   Constitutional:  Negative for chills, fatigue and fever.   Gastrointestinal:  Positive for diarrhea. Negative for abdominal distention, abdominal pain, blood in stool, nausea and vomiting.   Genitourinary:  Negative for dysuria, flank pain, menstrual problem and urgency.   Musculoskeletal:  Negative for myalgias.        Baseline chronic back pain   Skin:  Negative for rash and wound.   Neurological:  Negative for dizziness and headaches.       Physical Exam   BP: (!) 187/93  Pulse: 72  Temp: 98.8  F (37.1  C)  Resp: 18  SpO2: 98 %      Physical Exam  Vitals and nursing note reviewed.   Constitutional:       Appearance: Normal appearance.   HENT:      Head: Normocephalic.   Cardiovascular:      Rate and Rhythm: Normal rate and regular rhythm.      Comments: Hypertensive, did not take her home meds today  Pulmonary:      Effort: Pulmonary effort is normal. No respiratory distress.      Breath sounds: Normal breath sounds. No stridor.   Abdominal:      General: Abdomen is flat. There is no distension.      Palpations: Abdomen is soft.      Tenderness: There is no abdominal tenderness. There is no guarding.   Skin:     General: Skin is warm and dry.   Neurological:      General: No focal deficit present.      Mental Status: She is alert.   Psychiatric:      Comments: Mildly anxious          ED Course          Results for orders placed or performed during the  hospital encounter of 08/07/23 (from the past 24 hour(s))   CBC with platelets differential    Narrative    The following orders were created for panel order CBC with platelets differential.  Procedure                               Abnormality         Status                     ---------                               -----------         ------                     CBC with platelets and d...[[id number]]                      Final result                 Please view results for these tests on the individual orders.   Basic metabolic panel   Result Value Ref Range    Sodium 144 136 - 145 mmol/L    Potassium 3.5 3.4 - 5.3 mmol/L    Chloride 104 98 - 107 mmol/L    Carbon Dioxide (CO2) 26 22 - 29 mmol/L    Anion Gap 14 7 - 15 mmol/L    Urea Nitrogen 10.8 8.0 - 23.0 mg/dL    Creatinine 0.87 0.51 - 0.95 mg/dL    Calcium 9.5 8.8 - 10.2 mg/dL    Glucose 103 (H) 70 - 99 mg/dL    GFR Estimate 74 >60 mL/min/1.73m2   Magnesium   Result Value Ref Range    Magnesium 1.9 1.7 - 2.3 mg/dL   CBC with platelets and differential   Result Value Ref Range    WBC Count 11.0 4.0 - 11.0 10e3/uL    RBC Count 4.70 3.80 - 5.20 10e6/uL    Hemoglobin 13.5 11.7 - 15.7 g/dL    Hematocrit 42.4 35.0 - 47.0 %    MCV 90 78 - 100 fL    MCH 28.7 26.5 - 33.0 pg    MCHC 31.8 31.5 - 36.5 g/dL    RDW 12.6 10.0 - 15.0 %    Platelet Count 274 150 - 450 10e3/uL    % Neutrophils 70 %    % Lymphocytes 21 %    % Monocytes 7 %    % Eosinophils 1 %    % Basophils 1 %    % Immature Granulocytes 0 %    NRBCs per 100 WBC 0 <1 /100    Absolute Neutrophils 7.8 1.6 - 8.3 10e3/uL    Absolute Lymphocytes 2.3 0.8 - 5.3 10e3/uL    Absolute Monocytes 0.7 0.0 - 1.3 10e3/uL    Absolute Eosinophils 0.2 0.0 - 0.7 10e3/uL    Absolute Basophils 0.1 0.0 - 0.2 10e3/uL    Absolute Immature Granulocytes 0.0 <=0.4 10e3/uL    Absolute NRBCs 0.0 10e3/uL       Medications   atenolol (TENORMIN) tablet 50 mg (50 mg Oral $Given 8/7/23 7828)       Assessments & Plan (with Medical Decision Making)    Niesha Lyle presents to the Emergency Department with c/o diarrhea, attributed to diagnosis of gastroenteritis vs stress induced    -non-toxic, no acute abdomen  -no leukocytosis, afebrile, imaging not warranted due to clinical exam  -labs reviewed, showed no signs of dehydration  -ova/parasite labs pending  -no recent abx use, and clinically no concern for c.diff  -no recent travel, change in foods or water source    -hypertensive on arrival, gave home atenolol 50 mg. She reports increased BP due to no able to take her normal pain meds/muscle relaxants due to upcoming surgery.     -Patient verbally educated on their diagnoses and has no urther questions or concerns      Disposition: home    I have reviewed the nursing notes.    I have reviewed the findings, diagnosis, plan and need for follow up with the patient.      Discharge Medication List as of 8/7/2023  1:08 PM          Final diagnoses:   Gastroenteritis       8/7/2023   [facility]       Jeffry Watkins PA-C  08/07/23 1624

## 2023-10-25 ENCOUNTER — TRANSFERRED RECORDS (OUTPATIENT)
Dept: HEALTH INFORMATION MANAGEMENT | Facility: OTHER | Age: 63
End: 2023-10-25

## 2023-11-28 ENCOUNTER — OFFICE VISIT (OUTPATIENT)
Dept: SURGERY | Facility: OTHER | Age: 63
End: 2023-11-28
Attending: SURGERY
Payer: COMMERCIAL

## 2023-11-28 VITALS
OXYGEN SATURATION: 100 % | WEIGHT: 161 LBS | HEART RATE: 55 BPM | TEMPERATURE: 98.4 F | SYSTOLIC BLOOD PRESSURE: 138 MMHG | BODY MASS INDEX: 26.79 KG/M2 | RESPIRATION RATE: 16 BRPM | DIASTOLIC BLOOD PRESSURE: 88 MMHG

## 2023-11-28 DIAGNOSIS — N64.59 INVERSION OF LEFT NIPPLE: Primary | ICD-10-CM

## 2023-11-28 PROCEDURE — G0463 HOSPITAL OUTPT CLINIC VISIT: HCPCS

## 2023-11-28 PROCEDURE — 99204 OFFICE O/P NEW MOD 45 MIN: CPT | Performed by: SURGERY

## 2023-11-28 RX ORDER — TRAMADOL HYDROCHLORIDE 50 MG/1
50 TABLET ORAL EVERY 6 HOURS PRN
COMMUNITY
Start: 2023-11-16

## 2023-11-28 RX ORDER — GABAPENTIN 100 MG/1
100 CAPSULE ORAL EVERY 8 HOURS PRN
COMMUNITY
Start: 2023-10-16

## 2023-11-28 RX ORDER — ATENOLOL 50 MG/1
1 TABLET ORAL
COMMUNITY
Start: 2023-10-31

## 2023-11-28 ASSESSMENT — PAIN SCALES - GENERAL: PAINLEVEL: SEVERE PAIN (7)

## 2023-11-28 NOTE — PROGRESS NOTES
Primary Care Physician: Venus Donovan NP    I was requested to see this patient in consultation by Venus Donovan NP for evaluation of left breast nipple inversion and some bilateral breast pain. A copy of this note will be sent to Venus Donovan NP.    HPI:   The patient is 63 year old female with pain in her breasts. She has had this for years and it is a soreness. She notes it is some worse since her neck surgery. She is in physical therapy. She has had hyst at age 28 and tubes and ovaries out at age 41.  She has noted intermittent inversion of her left nipple that is increasing in the last few months. She has no nipple drainage bilaterally. She has not noted any new skin lesions on the breasts. She hasn't noted any new lumps or bumps.   No family history of breast cancer.   The patient hasn't had biopsy performed recently. She had a right breast biopsy in the past. She had mammogram and US done at [facility] in [place] 10/25/2023 showing heterogeneously dense breast tissue with some asymmetry noted in the left breast that didn't seem to persist on spot compression. She had left breast US that didn't show a solid mass. A cluster of cysts was noted within dense breast tissue om holli 12-1 o'clock position. No findings to explain new nipple inversion.       CONSULTATION ASSESSMENT AND PLAN/RECOMMENDATIONS:   I discussed with the patient the pathophysiology of breast pain and breast disease. We specifically discussed that most pain is not related to breast cancer. We discussed options for treatment including warmth, NSAIDs. I explained that good support is important in preventing breast pain. We discussed the option of performing a breast MRI due to the new left nipple inversion. The patient's questions were answered.The patient expressed understanding and wishes to proceed with breast MRI. She will follow up after to discuss the results and any further planning. She will call with questions or  concerns prior to follow up.    REVIEW OF SYSTEMS  GENERAL: No fevers or chills. Denies fatigue, recent weight loss.  HEENT: No sinus drainage. No changes with vision orhearing. No difficulty swallowing.   LYMPHATICS:  No swollen nodes in axilla, neck or groin.  CARDIOVASCULAR: Denies chest pain, palpitations and dyspnea on exertion.  PULMONARY: No shortness of breath or cough. Noincrease in sputum production.  GI: Denies melena, bright red blood in stools. No hematemesis. No constipation or diarrhea.  : No dysuria or hematuria.  SKIN: No recent rashes or ulcers.   HEMATOLOGY:  No history of easy bruising or bleeding.  ENDOCRINE:  No history of diabetes or thyroid problems.  NEUROLOGY:  No history of seizures or headaches. No motor or sensory changes.  BREAST: as above  Past Medical History:   Diagnosis Date    Essential hypertension     Hypercholesterolemia         Past Surgical History:   Procedure Laterality Date    BILATERAL SALPINGOOPHORECTOMY  2001    HYSTERECTOMY  1988    INCISIONAL BREAST BIOPSY Right     NECK SURGERY  2023       Current Outpatient Medications   Medication    acetaminophen (TYLENOL) 500 MG tablet    albuterol (PROAIR HFA/PROVENTIL HFA/VENTOLIN HFA) 108 (90 Base) MCG/ACT inhaler    Aspirin Buf,CaCarb-MgCarb-MgO, 81 MG TABS    atenolol (TENORMIN) 50 MG tablet    atorvastatin (LIPITOR) 80 MG tablet    budesonide-formoterol (SYMBICORT) 160-4.5 MCG/ACT Inhaler    Calcium Carbonate-Vitamin D (CALCIUM + D PO)    DULoxetine (CYMBALTA) 60 MG capsule    fluticasone-vilanterol (BREO ELLIPTA) 200-25 MCG/INH inhaler    gabapentin (NEURONTIN) 100 MG capsule    ibuprofen (ADVIL/MOTRIN) 200 MG tablet    omeprazole (PRILOSEC) 40 MG DR capsule    tiZANidine (ZANAFLEX) 4 MG capsule    traMADol (ULTRAM) 50 MG tablet    traZODone (DESYREL) 100 MG tablet     No current facility-administered medications for this visit.       Allergies   Allergen Reactions    Lisinopril     Morphine      History reviewed. No  pertinent family history.    Social History     Socioeconomic History    Marital status:      Spouse name: None    Number of children: None    Years of education: None    Highest education level: None   Tobacco Use    Smoking status: Never    Smokeless tobacco: Never   Vaping Use    Vaping Use: Some days    Substances: CBD   Substance and Sexual Activity    Alcohol use: No    Drug use: No    Sexual activity: Yes     Social Determinants of Health     Interpersonal Safety: Low Risk  (11/28/2023)    Interpersonal Safety     Do you feel physically and emotionally safe where you currently live?: Yes     Within the past 12 months, have you been hit, slapped, kicked or otherwise physically hurt by someone?: No     Within the past 12 months, have you been humiliated or emotionally abused in other ways by your partner or ex-partner?: No     The above history was reviewed and updated today, 11/28/2023  PHYSICAL EXAM  Vitals: /88 (BP Location: Right arm, Patient Position: Sitting, Cuff Size: Adult Regular)   Pulse 55   Temp 98.4  F (36.9  C) (Tympanic)   Resp 16   Wt 73 kg (161 lb)   LMP  (LMP Unknown)   SpO2 100%   BMI 26.79 kg/m    GENERAL: Healthy appearing patient in no acute distress. Pleasant and cooperative with exam and interview.   HEENT: Head-normocephalic. Eyes-no scleral icterus, pupils equal, round, andreactive to light. Nose-no nasal drainage. No lesions. Mouth-oral mucosa pink and moist, no lesions.  NECK: Supple. No thyroid nodules. Trachea midline. Healing incision.  LYMPHATICS:  No cervical, axillary or supraclavicularadenopathy.  CV: Regular rate and rhythm, no murmurs. No peripheral edema.  LUNGS:  No respiratory distress. Clear bilaterally to auscultation.  ABDOMEN: Non distended. Bowel sounds active. Soft, non-tender, nohepatosplenomegaly or hernias. No peritoneal signs.  SKIN: Pink, warm and dry. No jaundice. No rash.  NEURO:  Cranial nerves II-XII grossly intact. Alert and  oriented.  PSYCH: Appropriate mood and affect.  BREAST: Breasts were examined in the seated and supine position. No mass noted bilaterally. No nipple  discharge bilaterally. Diffuse bilateral mild tenderness noted. Dense breast tissue noted more on left than right. Well healed surgical scar on right. Nipple inversion noted left.     IMAGING/LAB  I will personally review patient's recentmammogram and US images and reports from [facility] when they arrive. I reviewed her most recent mammogram and US images in our system (2018)- simple cyst left upper outer breast

## 2023-11-28 NOTE — NURSING NOTE
"Chief Complaint   Patient presents with    Consult     Left breast       Initial /88 (BP Location: Right arm, Patient Position: Sitting, Cuff Size: Adult Regular)   Pulse 55   Temp 98.4  F (36.9  C) (Tympanic)   Resp 16   Wt 73 kg (161 lb)   LMP  (LMP Unknown)   SpO2 100%   BMI 26.79 kg/m   Estimated body mass index is 26.79 kg/m  as calculated from the following:    Height as of 5/19/23: 1.651 m (5' 5\").    Weight as of this encounter: 73 kg (161 lb).  Medication Reconciliation: complete    At what age did you start menopause? hysterectomy  What age did your menstrual cycle start? 14-15  Are you on or have you ever taken any hormone replacement or birth control? Birth control  How many children do you have? 2  How old were you when your first child was born? 18  Did you breast feed? no  Do you have a family history of breast cancer? No, dad's side unknown what cancer they had.  Nathalie Rice LPN..........11/28/2023  11:12 AM  "

## 2023-11-28 NOTE — PATIENT INSTRUCTIONS
You will get a call to schedule breast MRI in the next few days. I'll see you after that to discuss results and make a plan.   Call if you have concerns or questions before follow up.

## 2023-12-07 ENCOUNTER — HOSPITAL ENCOUNTER (OUTPATIENT)
Dept: MRI IMAGING | Facility: OTHER | Age: 63
Discharge: HOME | End: 2023-12-07
Attending: SURGERY | Admitting: SURGERY
Payer: COMMERCIAL

## 2023-12-07 DIAGNOSIS — N64.59 INVERSION OF LEFT NIPPLE: ICD-10-CM

## 2023-12-07 PROCEDURE — 255N000002 HC RX 255 OP 636: Mod: JZ | Performed by: SURGERY

## 2023-12-07 PROCEDURE — A9575 INJ GADOTERATE MEGLUMI 0.1ML: HCPCS | Mod: JZ | Performed by: SURGERY

## 2023-12-07 PROCEDURE — 77049 MRI BREAST C-+ W/CAD BI: CPT

## 2023-12-07 RX ORDER — GADOTERATE MEGLUMINE 376.9 MG/ML
15 INJECTION INTRAVENOUS ONCE
Status: COMPLETED | OUTPATIENT
Start: 2023-12-07 | End: 2023-12-07

## 2023-12-07 RX ADMIN — GADOTERATE MEGLUMINE 15 ML: 376.9 INJECTION INTRAVENOUS at 11:55

## 2023-12-14 ENCOUNTER — HOSPITAL ENCOUNTER (OUTPATIENT)
Dept: ULTRASOUND IMAGING | Facility: OTHER | Age: 63
Discharge: HOME | End: 2023-12-14
Attending: SURGERY | Admitting: SURGERY
Payer: COMMERCIAL

## 2023-12-14 DIAGNOSIS — R92.8 ABNORMAL FINDING ON BREAST IMAGING: ICD-10-CM

## 2023-12-14 PROCEDURE — 76642 ULTRASOUND BREAST LIMITED: CPT | Mod: RT

## 2023-12-18 ENCOUNTER — HOSPITAL ENCOUNTER (OUTPATIENT)
Dept: MAMMOGRAPHY | Facility: OTHER | Age: 63
Discharge: HOME | End: 2023-12-18
Attending: SURGERY
Payer: COMMERCIAL

## 2023-12-18 ENCOUNTER — HOSPITAL ENCOUNTER (OUTPATIENT)
Dept: ULTRASOUND IMAGING | Facility: OTHER | Age: 63
Discharge: HOME | End: 2023-12-18
Attending: SURGERY
Payer: COMMERCIAL

## 2023-12-18 VITALS — DIASTOLIC BLOOD PRESSURE: 84 MMHG | SYSTOLIC BLOOD PRESSURE: 136 MMHG | RESPIRATION RATE: 14 BRPM | HEART RATE: 80 BPM

## 2023-12-18 DIAGNOSIS — R92.8 ABNORMAL FINDING ON BREAST IMAGING: ICD-10-CM

## 2023-12-18 PROCEDURE — 19083 BX BREAST 1ST LESION US IMAG: CPT | Mod: RT

## 2023-12-18 PROCEDURE — 250N000009 HC RX 250: Performed by: RADIOLOGY

## 2023-12-18 PROCEDURE — 999N000065 MA POST PROCEDURE RIGHT

## 2023-12-18 PROCEDURE — 88305 TISSUE EXAM BY PATHOLOGIST: CPT

## 2023-12-18 RX ORDER — LIDOCAINE HYDROCHLORIDE 10 MG/ML
20 INJECTION, SOLUTION INFILTRATION; PERINEURAL ONCE
Status: COMPLETED | OUTPATIENT
Start: 2023-12-18 | End: 2023-12-18

## 2023-12-18 RX ORDER — LIDOCAINE HYDROCHLORIDE AND EPINEPHRINE 10; 10 MG/ML; UG/ML
20 INJECTION, SOLUTION INFILTRATION; PERINEURAL ONCE
Status: COMPLETED | OUTPATIENT
Start: 2023-12-18 | End: 2023-12-18

## 2023-12-18 RX ADMIN — LIDOCAINE HYDROCHLORIDE 7 ML: 10 INJECTION, SOLUTION INFILTRATION; PERINEURAL at 08:35

## 2023-12-18 RX ADMIN — LIDOCAINE HYDROCHLORIDE,EPINEPHRINE BITARTRATE 4 ML: 10; .01 INJECTION, SOLUTION INFILTRATION; PERINEURAL at 08:44

## 2023-12-18 NOTE — PROGRESS NOTES
Patient here for ultrasound guided biopsy of right breast.  Procedure reviewed with patient by writer and radiologist, questions answered.  Time out performed prior to biopsy.  Biopsy completed by radiologist, clip placed.  Pressure held to biopsy site for 10 minutes.  Medipore dressing applied.   Post clip mammogram completed.  Sports bra and ice pack applied over dressing.  Discharge instructions reviewed with patient, patient verbalizes understanding of instructions.  Discharged to home in stable condition with no evidence of bleeding from biopsy site.   Monica Ragsdale RN.

## 2023-12-18 NOTE — DISCHARGE INSTRUCTIONS
"NEEDLE BIOPSY BREAST    Activity: Rest the remainder of the day. You may resume normal activity after the next day. Avoid any vigorous/strenuous physical activity for 24 hours.    Comfort: If you have discomfort or tenderness at the site you may take your usual or recommended pain medication. Do not take aspirin the day of the procedure or for 48 hours following the biopsy.    Diet: You may resume your usual diet.    Care of site: Leave ice pack in place for 4 hours, or until it is no longer cold. The ice pack is reusable and may be refrozen.  Keep your bra and the dressing on for 24 hours. Then you may remove the bandage and shower. If there are steri-strips you may remove them in 3 to 5 days.  You may have some discomfort and a small amount of bruising where the biopsy was performed. This is normal. For several days or even a couple of weeks, you may have tenderness or \"twinges\" and a tiny bump where the needle went into the skin. This can be bothersome, but is not abnormal. You can use warm moist washcloths, as this may help. Do Not Use A Heating Pad.    RETURN TO THE EMERGENCY ROOM FOR:   Shortness of breath   Rapid heart rate   If pain becomes worse    Call Your Doctor For:    A fever over 101 degrees   Increased redness, increased swelling, and/or persistent drainage/discomfort  around the site    Other: At the end of your breast biopsy, a tiny titanium clip will be inserted through the biopsy needle and placed at the biopsy site within your breast. The marker provides a landmark of the biopsy for further mammograms or surgical procedures. This marker is MRI compatible and poses no known health risks.    You will be receiving a letter in the mail from [facility] Mammography Department with your biopsy results.  In 6 months a mammogram will be needed to establish a new baseline and to recheck the area where the biopsy occurred. Our radiology department will call you to schedule an appointment.    For " questions, problems or concerns, contact the Radiology Department at [phone].

## 2023-12-19 ENCOUNTER — TELEPHONE (OUTPATIENT)
Dept: SURGERY | Facility: OTHER | Age: 63
End: 2023-12-19
Payer: COMMERCIAL

## 2023-12-19 LAB
PATH REPORT.COMMENTS IMP SPEC: NORMAL
PATH REPORT.FINAL DX SPEC: NORMAL
PHOTO IMAGE: NORMAL

## 2023-12-21 ENCOUNTER — OFFICE VISIT (OUTPATIENT)
Dept: SURGERY | Facility: OTHER | Age: 63
End: 2023-12-21
Attending: SURGERY
Payer: COMMERCIAL

## 2023-12-21 VITALS
BODY MASS INDEX: 26.96 KG/M2 | WEIGHT: 162 LBS | TEMPERATURE: 99.5 F | OXYGEN SATURATION: 98 % | HEART RATE: 62 BPM | SYSTOLIC BLOOD PRESSURE: 164 MMHG | DIASTOLIC BLOOD PRESSURE: 82 MMHG | RESPIRATION RATE: 14 BRPM

## 2023-12-21 DIAGNOSIS — N64.59 INVERSION OF LEFT NIPPLE: ICD-10-CM

## 2023-12-21 DIAGNOSIS — R92.8 ABNORMAL MAGNETIC RESONANCE IMAGING OF RIGHT BREAST: Primary | ICD-10-CM

## 2023-12-21 DIAGNOSIS — N60.11 FIBROCYSTIC CHANGES OF RIGHT BREAST: ICD-10-CM

## 2023-12-21 PROCEDURE — G0463 HOSPITAL OUTPT CLINIC VISIT: HCPCS

## 2023-12-21 PROCEDURE — 99214 OFFICE O/P EST MOD 30 MIN: CPT | Performed by: SURGERY

## 2023-12-21 ASSESSMENT — PAIN SCALES - GENERAL: PAINLEVEL: SEVERE PAIN (6)

## 2023-12-21 NOTE — PATIENT INSTRUCTIONS
You will be due for a right breast mammogram and US in 6 months to follow up after the recent breast biopsy. If you have concerns or questions before that, please call! Have a great Bruce!

## 2023-12-21 NOTE — PROGRESS NOTES
Primary Care Physician: Venus Donovan NP    HPI:   Patient is here for follow up. The patient is 63 year old female with left nipple inversion.  Since her last visit she has had further imaging: breast MRI-showed a right breast enhancing nodule. She had US that confirmed an irregular cystic nodule in the area. She had US guided biopsy. She has done well since the biopsy. She is a bit bruised but not having a lot of pain. She is here with her  today. She has no new complaints.     ASSESSMENT AND PLAN/RECOMMENDATIONS: Abnormal MRI right breast:  Today, I discussed with the patient her results showing a cluster of microcysts and benign fibrocystic change.  We reviewed: her MRI and US images as well as her biopsy images and pathology report.   Patient questions were answered and she wishes to proceed with:continued annual screening mammogram. A 6 month right breast mammogram and US is recommended to follow up the breast after the recent biopsy.  Patient will call with questions or concerns.     Past Medical History:   Diagnosis Date    Essential hypertension     Hypercholesterolemia       Past Surgical History:   Procedure Laterality Date    BILATERAL SALPINGOOPHORECTOMY  2001    HYSTERECTOMY  1988    INCISIONAL BREAST BIOPSY Right     NECK SURGERY  2023     No family history on file.  Social History     Socioeconomic History    Marital status:      Spouse name: None    Number of children: None    Years of education: None    Highest education level: None   Tobacco Use    Smoking status: Never    Smokeless tobacco: Never   Vaping Use    Vaping Use: Some days    Substances: CBD   Substance and Sexual Activity    Alcohol use: No    Drug use: No    Sexual activity: Yes     Social Determinants of Health     Interpersonal Safety: Low Risk  (11/28/2023)    Interpersonal Safety     Do you feel physically and emotionally safe where you currently live?: Yes     Within the past 12 months, have you been hit,  slapped, kicked or otherwise physically hurt by someone?: No     Within the past 12 months, have you been humiliated or emotionally abused in other ways by your partner or ex-partner?: No     Current Outpatient Medications   Medication    acetaminophen (TYLENOL) 500 MG tablet    albuterol (PROAIR HFA/PROVENTIL HFA/VENTOLIN HFA) 108 (90 Base) MCG/ACT inhaler    Aspirin Buf,CaCarb-MgCarb-MgO, 81 MG TABS    atenolol (TENORMIN) 50 MG tablet    atorvastatin (LIPITOR) 80 MG tablet    budesonide-formoterol (SYMBICORT) 160-4.5 MCG/ACT Inhaler    Calcium Carbonate-Vitamin D (CALCIUM + D PO)    DULoxetine (CYMBALTA) 60 MG capsule    fluticasone-vilanterol (BREO ELLIPTA) 200-25 MCG/INH inhaler    gabapentin (NEURONTIN) 100 MG capsule    ibuprofen (ADVIL/MOTRIN) 200 MG tablet    omeprazole (PRILOSEC) 40 MG DR capsule    tiZANidine (ZANAFLEX) 4 MG capsule    traMADol (ULTRAM) 50 MG tablet    traZODone (DESYREL) 100 MG tablet     No current facility-administered medications for this visit.     Allergies   Allergen Reactions    Lisinopril     Morphine      Above history reviewed and updated today, 12/21/2023.    REVIEW OF SYSTEMS  GENERAL: No fevers or chills. Denies fatigue, recent weight loss.  HEENT: No sinus drainage. No changes with vision or hearing. No difficulty swallowing.   LYMPHATICS:  No swollen nodes in axilla, neck or groin.  CARDIOVASCULAR: Denies chest pain, palpitations and dyspnea on exertion.  PULMONARY: No shortness of breath or cough. No increase in sputum production.  GI: Denies melena, bright red blood in stools. No hematemesis. No constipation or diarrhea.  : No dysuria or hematuria.  BREAST: as above.  SKIN: No recent rashes or ulcers.   HEMATOLOGY:  No history of easy bruising or bleeding.  ENDOCRINE:  No history of diabetes or thyroid problems.  NEUROLOGY:  No history of seizures or headaches. No motor or sensory changes.    PHYSICAL EXAM  Vitals: BP (!) 164/82 (BP Location: Right arm, Patient  Position: Sitting, Cuff Size: Adult Regular)   Pulse 62   Temp 99.5  F (37.5  C) (Tympanic)   Resp 14   Wt 73.5 kg (162 lb)   LMP  (LMP Unknown)   SpO2 98%   BMI 26.96 kg/m    GENERAL: Healthy appearing patient in no acute distress. Pleasant and cooperative with exam and interview.   HEENT:Head-normocephalic. Eyes-no scleral icterus. Nose-no nasal drainage. No lesions. Mouth-oral mucosa pink and moist, no lesions.  NECK: Supple. No thyroid nodules. Trachea midline.  LYMPHATICS:  No cervical, axillary or supraclavicular adenopathy.  CV: Regular rate and rhythm, no murmurs. No peripheral edema.  LUNGS:  No respiratory distress. Clear bilaterally to auscultation.  BREASTS: examined bilaterally, no mass. Resolving ecchymosis right breast. No nipple changes or discharge. No unexpected skin changes.   SKIN: Pink, warm and dry. No jaundice. No rash.  NEURO:  Cranial nerves II-XII grossly intact. Alert and oriented.  PSYCH: Appropriate mood and affect.    IMAGING/LAB  I personally reviewed patient's MRI, US and biopsy  images and reports as well as pathology report.

## 2023-12-21 NOTE — NURSING NOTE
"Chief Complaint   Patient presents with    Consult     Right breast       Initial BP (!) 144/90 (BP Location: Right arm, Patient Position: Sitting, Cuff Size: Adult Large)   Pulse 62   Temp 99.5  F (37.5  C) (Tympanic)   Resp 14   Wt 73.5 kg (162 lb)   LMP  (LMP Unknown)   SpO2 98%   BMI 26.96 kg/m   Estimated body mass index is 26.96 kg/m  as calculated from the following:    Height as of 5/19/23: 1.651 m (5' 5\").    Weight as of this encounter: 73.5 kg (162 lb).  Medication Reconciliation: complete    Nathalie Rice LPN  "

## 2024-01-29 ENCOUNTER — OFFICE VISIT (OUTPATIENT)
Dept: CHIROPRACTIC MEDICINE | Facility: OTHER | Age: 64
End: 2024-01-29
Attending: CHIROPRACTOR
Payer: COMMERCIAL

## 2024-01-29 DIAGNOSIS — M54.50 ACUTE BILATERAL LOW BACK PAIN WITHOUT SCIATICA: ICD-10-CM

## 2024-01-29 DIAGNOSIS — M99.02 SEGMENTAL AND SOMATIC DYSFUNCTION OF THORACIC REGION: ICD-10-CM

## 2024-01-29 DIAGNOSIS — M99.03 SEGMENTAL AND SOMATIC DYSFUNCTION OF LUMBAR REGION: Primary | ICD-10-CM

## 2024-01-29 DIAGNOSIS — M99.01 SEGMENTAL AND SOMATIC DYSFUNCTION OF CERVICAL REGION: ICD-10-CM

## 2024-01-29 PROCEDURE — 98941 CHIROPRACT MANJ 3-4 REGIONS: CPT | Mod: AT | Performed by: CHIROPRACTOR

## 2024-02-05 NOTE — PROGRESS NOTES
Subjective Finding:    Chief compalint: Patient presents with:  Back Pain: Tightness in lower back , Pain Scale: 5/10, Intensity: sharp, Duration: 2 days, Radiating: bilateral buttock.    Date of injury:     Activities that the pain restricts:   Home/household/hobbies/social activities: Yes.  Work duties: No.  Sleep: No.  Makes symptoms better: rest.  Makes symptoms worse: activity and lumbar flexion.  Have you seen anyone else for the symptoms? No.  Work related: No.  Automobile related injury: No.    Objective and Assessment:    Posture Analysis:   High shoulder: .  Head tilt: .  High iliac crest: .  Head carriage: neutral.  Thoracic Kyphosis: neutral.  Lumbar Lordosis: neutral.    Lumbar Range of Motion: extension decreased.  Cervical Range of Motion: .  Thoracic Range of Motion: .  Extremity Range of Motion: .    Palpation:   Quad lumb: bilateral, referred pain: no    Segmental dysfunction pre-treatment and treatment area: C4, T5, L4, and L5.    Assessment post-treatment:  Cervical: ROM increased.  Thoracic: ROM increased.  Lumbar: ROM increased.    Comments: .      Complicating Factors: .    Procedure(s):  CMT:  00490 Chiropractic manipulative treatment 3-4 regions performed   Cervical: Diversified, See above for level, Supine, Thoracic: Diversified, See above for level, Prone, and Lumbar: Diversified, See above for level, Side posture    Modalities:  None performed this visit    Therapeutic procedures:  None    Plan:  Treatment plan: PRN.  Instructed patient: walk 10 minutes.  Short term goals: increase ROM.  Long term goals: restore normal function.  Prognosis: excellent.

## 2024-02-12 ENCOUNTER — OFFICE VISIT (OUTPATIENT)
Dept: CHIROPRACTIC MEDICINE | Facility: OTHER | Age: 64
End: 2024-02-12
Attending: CHIROPRACTOR
Payer: COMMERCIAL

## 2024-02-12 DIAGNOSIS — M99.03 SEGMENTAL AND SOMATIC DYSFUNCTION OF LUMBAR REGION: Primary | ICD-10-CM

## 2024-02-12 DIAGNOSIS — M99.02 SEGMENTAL AND SOMATIC DYSFUNCTION OF THORACIC REGION: ICD-10-CM

## 2024-02-12 DIAGNOSIS — M99.01 SEGMENTAL AND SOMATIC DYSFUNCTION OF CERVICAL REGION: ICD-10-CM

## 2024-02-12 DIAGNOSIS — M54.50 ACUTE BILATERAL LOW BACK PAIN WITHOUT SCIATICA: ICD-10-CM

## 2024-02-12 PROCEDURE — 98941 CHIROPRACT MANJ 3-4 REGIONS: CPT | Mod: AT | Performed by: CHIROPRACTOR

## 2024-02-14 ENCOUNTER — OFFICE VISIT (OUTPATIENT)
Dept: CHIROPRACTIC MEDICINE | Facility: OTHER | Age: 64
End: 2024-02-14
Attending: CHIROPRACTOR
Payer: COMMERCIAL

## 2024-02-14 DIAGNOSIS — M99.02 SEGMENTAL AND SOMATIC DYSFUNCTION OF THORACIC REGION: ICD-10-CM

## 2024-02-14 DIAGNOSIS — M54.42 ACUTE BILATERAL LOW BACK PAIN WITH BILATERAL SCIATICA: ICD-10-CM

## 2024-02-14 DIAGNOSIS — M54.41 ACUTE BILATERAL LOW BACK PAIN WITH BILATERAL SCIATICA: ICD-10-CM

## 2024-02-14 DIAGNOSIS — M99.03 SEGMENTAL AND SOMATIC DYSFUNCTION OF LUMBAR REGION: Primary | ICD-10-CM

## 2024-02-14 PROCEDURE — 98940 CHIROPRACT MANJ 1-2 REGIONS: CPT | Mod: AT | Performed by: CHIROPRACTOR

## 2024-02-14 NOTE — PROGRESS NOTES
Subjective Finding:    Chief compalint: Patient presents with:  Back Pain  , Pain Scale: 5/10, Intensity: sharp, Duration: 2 days, Radiating: bilateral buttock.    Date of injury:     Activities that the pain restricts:   Home/household/hobbies/social activities: Yes.  Work duties: No.  Sleep: No.  Makes symptoms better: rest.  Makes symptoms worse: activity and lumbar flexion.  Have you seen anyone else for the symptoms? No.  Work related: No.  Automobile related injury: No.    Objective and Assessment:    Posture Analysis:   High shoulder: .  Head tilt: .  High iliac crest: .  Head carriage: neutral.  Thoracic Kyphosis: neutral.  Lumbar Lordosis: neutral.    Lumbar Range of Motion: rom still restricted in lumbar.  Cervical Range of Motion: .  Thoracic Range of Motion: .  Extremity Range of Motion: .    Palpation:   Quad lumb: bilateral, referred pain: no    Segmental dysfunction pre-treatment and treatment area: C56  T1  L5.    Assessment post-treatment:  Cervical: ROM increased.  Thoracic: ROM increased.  Lumbar: ROM increased.    Comments: .      Complicating Factors: .    Procedure(s):  CMT:  94587 Chiropractic manipulative treatment 3-4 regions performed   Cervical: Diversified, See above for level, Supine, Thoracic: Diversified, See above for level, Prone, and Lumbar: Diversified, See above for level, Side posture    Modalities:  None performed this visit    Therapeutic procedures:  None    Plan:  Treatment plan: PRN.  Instructed patient: walk 10 minutes.  Short term goals: increase ROM.  Long term goals: restore normal function.  Prognosis: excellent.

## 2024-02-15 NOTE — PROGRESS NOTES
Subjective Finding:    Chief compalint: Patient presents with:  Back Pain: Low back pain  , Pain Scale: 5/10, Intensity: sharp, Duration: 4 days, Radiating: bilateral buttock.    Date of injury:     Activities that the pain restricts:   Home/household/hobbies/social activities: Yes.  Work duties: No.  Sleep: No.  Makes symptoms better: rest.  Makes symptoms worse: activity and lumbar flexion.  Have you seen anyone else for the symptoms? No.  Work related: No.  Automobile related injury: No.    Objective and Assessment:    Posture Analysis:   High shoulder: .  Head tilt: .  High iliac crest: .  Head carriage: neutral.  Thoracic Kyphosis: neutral.  Lumbar Lordosis: neutral.    Lumbar Range of Motion: rom still restricted in lumbar.  Cervical Range of Motion: .  Thoracic Range of Motion: .  Extremity Range of Motion: .    Palpation:   Quad lumb: bilateral, referred pain: no    Segmental dysfunction pre-treatment and treatment area:  T1  L5.  SI    Assessment post-treatment:  Cervical: ROM increased.  Thoracic: ROM increased.  Lumbar: ROM increased.    Comments: .  Relief with tx.  Rom is better.   She is doing home stretches          Complicating Factors: .    Procedure(s):  CMT:  74541 Chiropractic manipulative treatment 2 regions performed   T spine  L spine    Modalities:  None performed this visit    Therapeutic procedures:  None    Plan:  Treatment plan: PRN.  Instructed patient: walk 10 minutes.  Short term goals: increase ROM.  Long term goals: restore normal function.  Prognosis: excellent.

## 2024-03-05 ENCOUNTER — OFFICE VISIT (OUTPATIENT)
Dept: SURGERY | Facility: OTHER | Age: 64
End: 2024-03-05
Attending: NURSE PRACTITIONER
Payer: COMMERCIAL

## 2024-03-05 ENCOUNTER — PREP FOR PROCEDURE (OUTPATIENT)
Dept: SURGERY | Facility: OTHER | Age: 64
End: 2024-03-05

## 2024-03-05 ENCOUNTER — HOSPITAL ENCOUNTER (OUTPATIENT)
Facility: HOSPITAL | Age: 64
End: 2024-03-05
Attending: SURGERY | Admitting: SURGERY
Payer: COMMERCIAL

## 2024-03-05 VITALS
HEART RATE: 85 BPM | WEIGHT: 166 LBS | RESPIRATION RATE: 16 BRPM | BODY MASS INDEX: 27.66 KG/M2 | DIASTOLIC BLOOD PRESSURE: 70 MMHG | OXYGEN SATURATION: 93 % | HEIGHT: 65 IN | SYSTOLIC BLOOD PRESSURE: 120 MMHG

## 2024-03-05 DIAGNOSIS — K21.9 GASTROESOPHAGEAL REFLUX DISEASE WITHOUT ESOPHAGITIS: Primary | ICD-10-CM

## 2024-03-05 DIAGNOSIS — Z12.11 ENCOUNTER FOR SCREENING COLONOSCOPY: ICD-10-CM

## 2024-03-05 DIAGNOSIS — Z12.11 COLON CANCER SCREENING: ICD-10-CM

## 2024-03-05 DIAGNOSIS — K21.9 GERD (GASTROESOPHAGEAL REFLUX DISEASE): Primary | ICD-10-CM

## 2024-03-05 PROBLEM — R29.898 LEFT HAND WEAKNESS: Status: ACTIVE | Noted: 2020-08-27

## 2024-03-05 PROBLEM — M25.642 STIFFNESS OF FINGER JOINT OF LEFT HAND: Status: ACTIVE | Noted: 2020-08-27

## 2024-03-05 PROBLEM — M25.632 STIFFNESS OF LEFT WRIST JOINT: Status: ACTIVE | Noted: 2020-08-27

## 2024-03-05 PROBLEM — M79.89 SWELLING OF LEFT HAND: Status: ACTIVE | Noted: 2020-08-27

## 2024-03-05 PROBLEM — M79.642 PAIN OF LEFT HAND: Status: ACTIVE | Noted: 2020-08-27

## 2024-03-05 PROCEDURE — G0463 HOSPITAL OUTPT CLINIC VISIT: HCPCS

## 2024-03-05 PROCEDURE — 99213 OFFICE O/P EST LOW 20 MIN: CPT | Performed by: NURSE PRACTITIONER

## 2024-03-05 RX ORDER — BISACODYL 5 MG/1
5 TABLET, DELAYED RELEASE ORAL DAILY PRN
Qty: 4 TABLET | Refills: 0 | Status: SHIPPED | OUTPATIENT
Start: 2024-03-05

## 2024-03-05 ASSESSMENT — PAIN SCALES - GENERAL: PAINLEVEL: NO PAIN (0)

## 2024-03-05 NOTE — PATIENT INSTRUCTIONS
Thank you for allowing Albertina Herrera CNP and our surgical team to participate in your care. Please call our health unit coordinator at [phone] with scheduling questions or the nurse at [phone] with any other questions or concerns.      You have been scheduled for:  Upper endoscopy and colonoscopy  with  on 5/6/24.   You will use golytely bowel prep.  Please see handout for additional instruction.  You will not need a pre-operative appointment with your primary care provider.  You may call [phone] or [phone] with any questions.

## 2024-03-05 NOTE — PROGRESS NOTES
CLINIC NOTE - CONSULT  3/5/2024    Patient : Niesha Lyle    [place] Physician : [place] Donovan [place] [facility] [place] : Upper and lower endoscopy    This is a 63 year old female with a need for an upper and lower endoscopy.  Upper endoscopy is needed for Gastroesohpageal Reflux.  Lower endoscopy is needed for Screening colonoscopy.      Last EGD : a couple of years ago  History of GERD : YES  History of PUD : NO  On PPI's :  prilosec   Drug and Dose : 40 mg bid  History of dysphagia : NO   Dysphagia to solids greater than liquids : NO  Hematemesis : NO  Melena : NO    Last colonoscopy : over 10 years  Family history of colon cancer : NO  Family history of colon polyps : NO  Personal history of colon cancer : NO  Personal history of colon polyps : NO  Rectal bleeding : NO  Changes in bowel habits :NO  Personal history of inflammatory bowel disease : NO    Past Medical History:  Past Medical History:   Diagnosis Date    Essential hypertension     Hypercholesterolemia        Past Surgical History:  Past Surgical History:   Procedure Laterality Date    BILATERAL SALPINGOOPHORECTOMY  2001    HYSTERECTOMY  1988    INCISIONAL BREAST BIOPSY Right     NECK SURGERY  2023       Family History History:  History reviewed. No pertinent family history.    History of Tobacco Use:  History   Smoking Status    Never   Smokeless Tobacco    Never       Current Medications:  Current Outpatient Medications   Medication Sig Dispense Refill    acetaminophen (TYLENOL) 500 MG tablet Take 500 mg by mouth      albuterol (PROAIR HFA/PROVENTIL HFA/VENTOLIN HFA) 108 (90 Base) MCG/ACT inhaler Inhale 2 puffs into the lungs every 6 hours as needed for shortness of breath / dyspnea or wheezing 18 g 0    Aspirin Buf,CaCarb-MgCarb-MgO, 81 MG TABS       atenolol (TENORMIN) 50 MG tablet Take 1 tablet by mouth daily at 2 pm      atorvastatin (LIPITOR) 80 MG tablet Take 80 mg by mouth daily      budesonide-formoterol (SYMBICORT)  "160-4.5 MCG/ACT Inhaler Inhale 2 puffs into the lungs 2 times daily      Calcium Carbonate-Vitamin D (CALCIUM + D PO) Take 1 tablet by mouth daily      DULoxetine (CYMBALTA) 60 MG capsule Take 60 mg by mouth daily      fluticasone-vilanterol (BREO ELLIPTA) 200-25 MCG/INH inhaler Inhale 1 puff into the lungs daily      gabapentin (NEURONTIN) 100 MG capsule Take 100 mg by mouth every 8 hours as needed      ibuprofen (ADVIL/MOTRIN) 200 MG tablet Take 200 mg by mouth      omeprazole (PRILOSEC) 40 MG DR capsule Take 40 mg by mouth 2 times daily      tiZANidine (ZANAFLEX) 4 MG capsule Take 4 mg by mouth 3 times daily as needed for muscle spasms      traMADol (ULTRAM) 50 MG tablet Take 50 mg by mouth every 6 hours as needed for pain      traZODone (DESYREL) 100 MG tablet Take 100 mg by mouth At Bedtime         Allergies:  Allergies   Allergen Reactions    Lisinopril     Morphine        ROS:  Constitutional: negative  Eyes: negative  Ears, nose, mouth, throat, and face: negative  Respiratory: positive for asthma  Cardiovascular: negative  Gastrointestinal: positive for GERD  Genitourinary:negative  Integument/breast: negative  Hematologic/lymphatic: negative  Musculoskeletal: positive for history of neck surgery, back pain, fibromyalgia  Neurological: positive for headaches and seizures as infant, dizziness history  Behavioral/Psych: negative  Endocrine: negative  Allergic/Immunologic: negative    PHYSICAL EXAM:     Vital signs: /70 (BP Location: Left arm, Cuff Size: Adult Regular)   Pulse 85   Resp 16   Ht 1.651 m (5' 5\")   Wt 75.3 kg (166 lb)   LMP  (LMP Unknown)   SpO2 93%   BMI 27.62 kg/m     BMI: Body mass index is 27.62 kg/m .   General: Normal, healthy, cooperative, in no acute distress, alert   Skin: no rashes   Lungs: clear to auscultation   CV: Regular rate and rhythm   Abdominal: non-distended, soft, non-tender to palpation   Extremities: No cyanosis, clubbing or edema noted bilaterally in Upper and " Lower Extremities   Neurological: without deficit    Assessment:   63 year old female with need for upper endoscopy for Gastroesohpageal Reflux and lower endoscopy for Screening colonoscopy:    Plan:   Will schedule an esophagogastroduodenoscopy and colonoscopy.  The procedures with their risks, benefits and alternatives were explained.  Risks include but are not limited to bleeding, perforation, missing lesions, need for additional procedures, reaction to anesthesia.  All the patients questions were answered.  The patient consents to proceed.  The procedures will be scheduled.

## 2024-03-19 ENCOUNTER — OFFICE VISIT (OUTPATIENT)
Dept: CHIROPRACTIC MEDICINE | Facility: OTHER | Age: 64
End: 2024-03-19
Attending: CHIROPRACTOR
Payer: COMMERCIAL

## 2024-03-19 DIAGNOSIS — M99.02 SEGMENTAL AND SOMATIC DYSFUNCTION OF THORACIC REGION: ICD-10-CM

## 2024-03-19 DIAGNOSIS — M99.01 SEGMENTAL AND SOMATIC DYSFUNCTION OF CERVICAL REGION: ICD-10-CM

## 2024-03-19 DIAGNOSIS — M54.50 ACUTE BILATERAL LOW BACK PAIN WITHOUT SCIATICA: ICD-10-CM

## 2024-03-19 DIAGNOSIS — M99.03 SEGMENTAL AND SOMATIC DYSFUNCTION OF LUMBAR REGION: Primary | ICD-10-CM

## 2024-03-19 PROCEDURE — 98941 CHIROPRACT MANJ 3-4 REGIONS: CPT | Mod: AT | Performed by: CHIROPRACTOR

## 2024-03-19 NOTE — PROGRESS NOTES
Subjective Finding:    Chief compalint: Patient presents with:  Back Pain: Tightness in lower back , Pain Scale: 3/10, Intensity: sharp, Duration: 2 weeks, Radiating: bilateral buttock.    Date of injury:     Activities that the pain restricts:   Home/household/hobbies/social activities: Yes.  Work duties: Yes.  Sleep: No.  Makes symptoms better: rest.  Makes symptoms worse: activity and lumbar flexion.  Have you seen anyone else for the symptoms? No.  Work related: No.  Automobile related injury: No.    Objective and Assessment:    Posture Analysis:   High shoulder: .  Head tilt: .  High iliac crest: .  Head carriage: neutral.  Thoracic Kyphosis: neutral.  Lumbar Lordosis: forward.    Lumbar Range of Motion: flexion decreased and extension decreased.  Cervical Range of Motion: .  Thoracic Range of Motion: .  Extremity Range of Motion: .    Palpation:   Quad lumb: bilateral, referred pain: no    Segmental dysfunction pre-treatment and treatment area: C5, T5, T6, L4, and L5.    Assessment post-treatment:  Cervical: ROM increased.  Thoracic: ROM increased.  Lumbar: ROM increased.    Comments: .      Complicating Factors: .    Procedure(s):  [facility]:  86370 Chiropractic manipulative treatment 3-4 regions performed   Cervical: Diversified, See above for level, Supine, Thoracic: Diversified, See above for level, Prone, and Lumbar: Diversified, See above for level, Side posture    Modalities:  None performed this visit    Therapeutic procedures:  None    Plan:  Treatment plan: PRN.  Instructed patient: walk 10 minutes.  Short term goals: reduce pain.  Long term goals: restore normal function.  Prognosis: excellent.

## 2024-03-21 ENCOUNTER — OFFICE VISIT (OUTPATIENT)
Dept: CHIROPRACTIC MEDICINE | Facility: OTHER | Age: 64
End: 2024-03-21
Attending: CHIROPRACTOR
Payer: COMMERCIAL

## 2024-03-21 DIAGNOSIS — M99.03 SEGMENTAL AND SOMATIC DYSFUNCTION OF LUMBAR REGION: Primary | ICD-10-CM

## 2024-03-21 DIAGNOSIS — M99.02 SEGMENTAL AND SOMATIC DYSFUNCTION OF THORACIC REGION: ICD-10-CM

## 2024-03-21 DIAGNOSIS — M54.50 ACUTE BILATERAL LOW BACK PAIN WITHOUT SCIATICA: ICD-10-CM

## 2024-03-21 DIAGNOSIS — M99.01 SEGMENTAL AND SOMATIC DYSFUNCTION OF CERVICAL REGION: ICD-10-CM

## 2024-03-21 PROCEDURE — 98941 CHIROPRACT MANJ 3-4 REGIONS: CPT | Mod: AT | Performed by: CHIROPRACTOR

## 2024-03-22 NOTE — PROGRESS NOTES
Subjective Finding:    Chief compalint: Patient presents with:  Back Pain: Tightness in lower back is better   , Pain Scale: 5/10, Intensity: dull, Duration: 2 days, Radiating: bilateral buttock.    Date of injury:     Activities that the pain restricts:   Home/household/hobbies/social activities: Yes.  Work duties: No.  Sleep: No.  Makes symptoms better: rest.  Makes symptoms worse: lumbar flexion.  Have you seen anyone else for the symptoms? No.  Work related: No.  Automobile related injury: No.    Objective and Assessment:    Posture Analysis:   High shoulder: .  Head tilt: .  High iliac crest: .  Head carriage: neutral.  Thoracic Kyphosis: neutral.  Lumbar Lordosis: forward.    Lumbar Range of Motion: extension decreased.  Cervical Range of Motion: .  Thoracic Range of Motion: .  Extremity Range of Motion: .    Palpation:   Quad lumb: bilateral, referred pain: no    Segmental dysfunction pre-treatment and treatment area: C6, T6, L3, and L5.    Assessment post-treatment:  Cervical: ROM increased.  Thoracic: ROM increased.  Lumbar: ROM increased.    Comments: .      Complicating Factors: .    Procedure(s):  CMT:  03092 Chiropractic manipulative treatment 3-4 regions performed   Cervical: Diversified, See above for level, Supine, Thoracic: Diversified, See above for level, Prone, and Lumbar: Diversified, See above for level, Side posture    Modalities:  None performed this visit    Therapeutic procedures:  None    Plan:  Treatment plan: PRN.  Instructed patient: stretch as instructed at visit.  Short term goals: reduce pain.  Long term goals: restore normal function.  Prognosis: very good.

## 2024-04-29 RX ORDER — LIDOCAINE 40 MG/G
CREAM TOPICAL
Status: CANCELLED | OUTPATIENT
Start: 2024-04-29

## 2024-04-29 RX ORDER — ONDANSETRON 4 MG/1
4 TABLET, ORALLY DISINTEGRATING ORAL EVERY 30 MIN PRN
Status: CANCELLED | OUTPATIENT
Start: 2024-04-29

## 2024-04-29 RX ORDER — SODIUM CHLORIDE, SODIUM LACTATE, POTASSIUM CHLORIDE, CALCIUM CHLORIDE 600; 310; 30; 20 MG/100ML; MG/100ML; MG/100ML; MG/100ML
INJECTION, SOLUTION INTRAVENOUS CONTINUOUS
Status: CANCELLED | OUTPATIENT
Start: 2024-04-29

## 2024-04-29 RX ORDER — NALOXONE HYDROCHLORIDE 0.4 MG/ML
0.1 INJECTION, SOLUTION INTRAMUSCULAR; INTRAVENOUS; SUBCUTANEOUS
Status: CANCELLED | OUTPATIENT
Start: 2024-04-29

## 2024-04-29 RX ORDER — ONDANSETRON 2 MG/ML
4 INJECTION INTRAMUSCULAR; INTRAVENOUS EVERY 30 MIN PRN
Status: CANCELLED | OUTPATIENT
Start: 2024-04-29

## 2024-05-03 ENCOUNTER — TELEPHONE (OUTPATIENT)
Dept: SURGERY | Facility: OTHER | Age: 64
End: 2024-05-03

## 2024-05-03 NOTE — OR NURSING
Received message from patient stating she needs to cancel due to family illness.  OR  and Dr. Gil's office notified.

## 2024-05-03 NOTE — TELEPHONE ENCOUNTER
----- Message from Gini Gonsalez RN sent at 5/3/2024  7:00 AM CDT -----  Regarding: patient cancelling  She's scheduled 05/06 with Dr. Gil. Received message from patient stating she needs to cancel due to family illness.

## 2024-06-07 ENCOUNTER — MEDICAL CORRESPONDENCE (OUTPATIENT)
Dept: MRI IMAGING | Facility: HOSPITAL | Age: 64
End: 2024-06-07

## 2024-06-14 ENCOUNTER — HOSPITAL ENCOUNTER (OUTPATIENT)
Dept: MRI IMAGING | Facility: HOSPITAL | Age: 64
Discharge: HOME | End: 2024-06-14
Attending: NURSE PRACTITIONER | Admitting: NURSE PRACTITIONER
Payer: COMMERCIAL

## 2024-06-14 DIAGNOSIS — R25.1 TREMOR, UNSPECIFIED: ICD-10-CM

## 2024-06-14 PROCEDURE — 255N000002 HC RX 255 OP 636: Mod: JZ | Performed by: RADIOLOGY

## 2024-06-14 PROCEDURE — A9585 GADOBUTROL INJECTION: HCPCS | Mod: JZ | Performed by: RADIOLOGY

## 2024-06-14 PROCEDURE — 70553 MRI BRAIN STEM W/O & W/DYE: CPT

## 2024-06-14 RX ORDER — GADOBUTROL 604.72 MG/ML
7.5 INJECTION INTRAVENOUS ONCE
Status: COMPLETED | OUTPATIENT
Start: 2024-06-14 | End: 2024-06-14

## 2024-06-14 RX ADMIN — GADOBUTROL 7.5 ML: 604.72 INJECTION INTRAVENOUS at 08:42

## 2024-07-05 ENCOUNTER — HOSPITAL ENCOUNTER (OUTPATIENT)
Dept: ULTRASOUND IMAGING | Facility: OTHER | Age: 64
Discharge: HOME | End: 2024-07-05
Attending: SURGERY
Payer: COMMERCIAL

## 2024-07-05 ENCOUNTER — HOSPITAL ENCOUNTER (OUTPATIENT)
Dept: MAMMOGRAPHY | Facility: OTHER | Age: 64
Discharge: HOME | End: 2024-07-05
Attending: SURGERY
Payer: COMMERCIAL

## 2024-07-05 DIAGNOSIS — R92.8 ABNORMAL FINDING ON BREAST IMAGING: ICD-10-CM

## 2024-07-05 DIAGNOSIS — Z09 FOLLOW-UP EXAM, 3-6 MONTHS SINCE PREVIOUS EXAM: ICD-10-CM

## 2024-07-05 PROCEDURE — 77061 BREAST TOMOSYNTHESIS UNI: CPT | Mod: RT

## 2024-07-05 PROCEDURE — 76642 ULTRASOUND BREAST LIMITED: CPT | Mod: RT

## 2024-10-19 ENCOUNTER — HOSPITAL ENCOUNTER (EMERGENCY)
Facility: HOSPITAL | Age: 64
Discharge: HOME | End: 2024-10-19
Payer: COMMERCIAL

## 2024-10-19 ENCOUNTER — APPOINTMENT (OUTPATIENT)
Dept: GENERAL RADIOLOGY | Facility: HOSPITAL | Age: 64
End: 2024-10-19
Payer: COMMERCIAL

## 2024-10-19 VITALS
HEART RATE: 70 BPM | DIASTOLIC BLOOD PRESSURE: 86 MMHG | TEMPERATURE: 98.2 F | RESPIRATION RATE: 18 BRPM | SYSTOLIC BLOOD PRESSURE: 158 MMHG | BODY MASS INDEX: 28.29 KG/M2 | WEIGHT: 170 LBS | OXYGEN SATURATION: 95 %

## 2024-10-19 DIAGNOSIS — J40 BRONCHITIS: ICD-10-CM

## 2024-10-19 DIAGNOSIS — R35.0 URINARY FREQUENCY: ICD-10-CM

## 2024-10-19 LAB
ALBUMIN UR-MCNC: NEGATIVE MG/DL
APPEARANCE UR: CLEAR
BACTERIAL VAGINOSIS VAG-IMP: NEGATIVE
BILIRUB UR QL STRIP: NEGATIVE
CANDIDA DNA VAG QL NAA+PROBE: NOT DETECTED
CANDIDA GLABRATA / CANDIDA KRUSEI DNA: NOT DETECTED
COLOR UR AUTO: ABNORMAL
GLUCOSE UR STRIP-MCNC: NEGATIVE MG/DL
HGB UR QL STRIP: NEGATIVE
KETONES UR STRIP-MCNC: NEGATIVE MG/DL
LEUKOCYTE ESTERASE UR QL STRIP: NEGATIVE
MUCOUS THREADS #/AREA URNS LPF: PRESENT /LPF
NITRATE UR QL: NEGATIVE
PH UR STRIP: 5 [PH] (ref 4.7–8)
RBC URINE: 1 /HPF
SP GR UR STRIP: 1.01 (ref 1–1.03)
SQUAMOUS EPITHELIAL: 0 /HPF
T VAGINALIS DNA VAG QL NAA+PROBE: NOT DETECTED
UROBILINOGEN UR STRIP-MCNC: NORMAL MG/DL
WBC URINE: <1 /HPF

## 2024-10-19 PROCEDURE — 99213 OFFICE O/P EST LOW 20 MIN: CPT

## 2024-10-19 PROCEDURE — 81001 URINALYSIS AUTO W/SCOPE: CPT | Performed by: PHYSICIAN ASSISTANT

## 2024-10-19 PROCEDURE — 71046 X-RAY EXAM CHEST 2 VIEWS: CPT

## 2024-10-19 PROCEDURE — 0352U MULTIPLEX VAGINAL PANEL BY PCR: CPT

## 2024-10-19 PROCEDURE — G0463 HOSPITAL OUTPT CLINIC VISIT: HCPCS | Mod: 25

## 2024-10-19 RX ORDER — PREDNISONE 20 MG/1
TABLET ORAL
Qty: 10 TABLET | Refills: 0 | Status: SHIPPED | OUTPATIENT
Start: 2024-10-19

## 2024-10-19 ASSESSMENT — ENCOUNTER SYMPTOMS
ABDOMINAL PAIN: 0
COUGH: 1
DIARRHEA: 0
FREQUENCY: 1
HEMATURIA: 0
NAUSEA: 0
ACTIVITY CHANGE: 0
FEVER: 0
BACK PAIN: 1
APPETITE CHANGE: 0
DYSURIA: 1
SORE THROAT: 0
SHORTNESS OF BREATH: 1
VOMITING: 0
WHEEZING: 1

## 2024-10-19 ASSESSMENT — ACTIVITIES OF DAILY LIVING (ADL): ADLS_ACUITY_SCORE: 35

## 2024-10-19 NOTE — ED TRIAGE NOTES
Pt presents with urinary frequency that started a couple weeks ago. Pt states has cough that started a couple days ago. Pt denies fevers. Pt states is sexually active. Pt is not concerned about STI's.

## 2024-10-19 NOTE — DISCHARGE INSTRUCTIONS
Prednisone once daily for 5 days.   Push fluids.   Tylenol as needed.   Rescue inhaler as prescribed if needed.     Follow up in the clinic for a recheck.   Return with any new or concerning symptoms.     We will notify you with results.

## 2024-10-19 NOTE — ED PROVIDER NOTES
History     Chief Complaint   Patient presents with    Urinary Frequency     HPI  Niesha Lyle is a 64 year old female who presents to the urgent care with complaints of urinary frequency for the last 2-3 weeks. She also has had a cough and shortness of breath for the last 2-3 days. Hx of asthma. Has not had to use rescue inhaler today. She denies fevers, chills, abd pain, back pain, n/v/d, hematuria, and vaginal discharge, denies concern for STIs. No recent abx or OTC medications.     Allergies:  Allergies   Allergen Reactions    Lisinopril     Morphine        Problem List:    Patient Active Problem List    Diagnosis Date Noted    Left hand weakness 08/27/2020     Priority: Medium    Pain of left hand 08/27/2020     Priority: Medium    Stiffness of finger joint of left hand 08/27/2020     Priority: Medium    Stiffness of left wrist joint 08/27/2020     Priority: Medium    Swelling of left hand 08/27/2020     Priority: Medium    Lumbar facet arthropathy 10/21/2015     Priority: Medium    Physical deconditioning 11/12/2014     Priority: Medium    Hyperlipidemia with target LDL less than 130 07/26/2013     Priority: Medium     Overview:    IMO Update      Cubital tunnel syndrome 07/14/2010     Priority: Medium    Raynaud's disease 05/04/2010     Priority: Medium     Overview:   Borderline positive SAVITA      Osteopenia 08/20/2008     Priority: Medium    Thoracic or lumbosacral neuritis or radiculitis 08/20/2008     Priority: Medium     Overview:   IMO Update 10/11      Sciatica 08/13/2008     Priority: Medium    Pain in joint, shoulder region 06/09/2008     Priority: Medium     Overview:    IMO Update 10/11      Enthesopathy of hip region 07/09/2007     Priority: Medium    Restless legs syndrome (RLS) 02/26/2007     Priority: Medium     Overview:   IMO Update 10/11      Migraine without intractable migraine 08/02/2006     Priority: Medium     Overview:   IMO Update 10/11      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    Social History:  Marital Status:   [2]  Social History     Tobacco Use    Smoking status: Never    Smokeless tobacco: Never   Vaping Use    Vaping status: Some Days    Substances: CBD   Substance Use Topics    Alcohol use: No    Drug use: No        Medications:    acetaminophen (TYLENOL) 500 MG tablet  albuterol (PROAIR HFA/PROVENTIL HFA/VENTOLIN HFA) 108 (90 Base) MCG/ACT inhaler  Aspirin Buf,CaCarb-MgCarb-MgO, 81 MG TABS  atenolol (TENORMIN) 50 MG tablet  atorvastatin (LIPITOR) 80 MG tablet  bisacodyl (DULCOLAX) 5 MG EC tablet  budesonide-formoterol (SYMBICORT) 160-4.5 MCG/ACT Inhaler  Calcium Carbonate-Vitamin D (CALCIUM + D PO)  DULoxetine (CYMBALTA) 60 MG capsule  fluticasone-vilanterol (BREO ELLIPTA) 200-25 MCG/INH inhaler  gabapentin (NEURONTIN) 100 MG capsule  ibuprofen (ADVIL/MOTRIN) 200 MG tablet  omeprazole (PRILOSEC) 40 MG DR capsule  tiZANidine (ZANAFLEX) 4 MG capsule  traMADol (ULTRAM) 50 MG tablet  traZODone (DESYREL) 100 MG tablet  predniSONE (DELTASONE) 20 MG tablet          Review of Systems   Constitutional:  Negative for activity change, appetite change and fever.   HENT:  Negative for congestion, ear pain and sore throat.    Respiratory:  Positive for cough, shortness of breath and wheezing (at night).    Gastrointestinal:  Negative for abdominal pain, diarrhea, nausea and vomiting.   Genitourinary:  Positive for dysuria and frequency. Negative for hematuria, vaginal  discharge and vaginal pain.   Musculoskeletal:  Positive for back pain (chronic, unchanged).   All other systems reviewed and are negative.      Physical Exam   BP: (!) 190/82  Pulse: 70  Temp: 98.2  F (36.8  C)  Resp: 18  Weight: 77.1 kg (170 lb)  SpO2: 95 %      Physical Exam  Vitals and nursing note reviewed.   Constitutional:       General: She is not in acute distress.     Appearance: Normal appearance. She is ill-appearing. She is not toxic-appearing or diaphoretic.   HENT:      Head:      Jaw: No trismus.      Right Ear: Tympanic membrane is not erythematous.      Left Ear: Tympanic membrane is not erythematous.      Mouth/Throat:      Mouth: Mucous membranes are moist.      Pharynx: Oropharynx is clear. No oropharyngeal exudate or posterior oropharyngeal erythema.   Cardiovascular:      Rate and Rhythm: Normal rate and regular rhythm.      Heart sounds: Normal heart sounds. No murmur heard.  Pulmonary:      Effort: Pulmonary effort is normal.      Breath sounds: Normal breath sounds. No wheezing, rhonchi or rales.   Abdominal:      General: Abdomen is flat. Bowel sounds are normal.      Palpations: Abdomen is soft.      Tenderness: There is no abdominal tenderness. There is no right CVA tenderness or left CVA tenderness.   Neurological:      Mental Status: She is alert.         ED Course        Procedures       Results for orders placed or performed during the hospital encounter of 10/19/24 (from the past 24 hour(s))   UA Macroscopic with reflex to Microscopic and Culture    Specimen: Urine, Clean Catch   Result Value Ref Range    Color Urine Light Yellow Colorless, Straw, Light Yellow, Yellow    Appearance Urine Clear Clear    Glucose Urine Negative Negative mg/dL    Bilirubin Urine Negative Negative    Ketones Urine Negative Negative mg/dL    Specific Gravity Urine 1.015 1.003 - 1.035    Blood Urine Negative Negative    pH Urine 5.0 4.7 - 8.0    Protein Albumin Urine Negative Negative mg/dL    Urobilinogen  Urine Normal Normal, 2.0 mg/dL    Nitrite Urine Negative Negative    Leukocyte Esterase Urine Negative Negative    Mucus Urine Present (A) None Seen /LPF    RBC Urine 1 <=2 /HPF    WBC Urine <1 <=5 /HPF    Squamous Epithelials Urine 0 <=1 /HPF    Narrative    Microscopic not indicated  Urine Culture not indicated   Chest XR,  PA & LAT    Narrative    Procedure:XR CHEST 2 VIEWS    Clinical history:Female, 64 years, cough    Technique: Two views are submitted.    Comparison: 9/26/2018    Findings: The cardiac silhouette is within normal limits.. The  pulmonary vasculature is within normal limits.    The lungs are clear. Bony structures are unremarkable.      Impression    Impression:   No acute abnormality. No evidence of acute or active cardiopulmonary  disease.    DELVIN NGUYEN MD         SYSTEM ID:  [id number]       Medications - No data to display    Assessments & Plan (with Medical Decision Making)     I have reviewed the nursing notes.    I have reviewed the findings, diagnosis, plan and need for follow up with the patient.  Niesha Lyle is a 64 year old female who presents to the urgent care with complaints of urinary frequency for the last 2-3 weeks. She also has had a cough and shortness of breath for the last 2-3 days. Hx of asthma. Has not had to use rescue inhaler today. She denies fevers, chills, abd pain, back pain, n/v/d, hematuria, and vaginal discharge, denies concern for STIs. No recent abx or OTC medications.     MDM: vital signs normal, afebrile. Non toxic in appearance with no noted distress. Able to speak in complete sentences without dyspnea. Lungs clear, heart tones regular. Bowel sounds active, abd soft with no CVA tenderness. UA negative for infection. Vaginal multiplex pending. Harsh cough noted. Hx of pneumonia and asthma. CXR reviewed with no evidence of acute or active cardiopulmonary disease, per radiologist. Prednisone prescribed. Encouraged close follow up. Supportive measures  and return precautions discussed. She is in agreement with plan.      (J40) Bronchitis, (R35.0) Urinary frequency  Plan: Prednisone once daily for 5 days.   Push fluids.   Tylenol as needed.   Rescue inhaler as prescribed if needed.     Follow up in the clinic for a recheck.   Return with any new or concerning symptoms.     We will notify you with results. Understanding verbalized.     New Prescriptions    PREDNISONE (DELTASONE) 20 MG TABLET    Take two tablets (= 40mg) each day for 5 (five) days       Final diagnoses:   Bronchitis   Urinary frequency       10/19/2024   [facility]       Roseanna Fletcher NP  10/19/24 [id number]

## 2024-12-15 ENCOUNTER — APPOINTMENT (OUTPATIENT)
Dept: GENERAL RADIOLOGY | Facility: HOSPITAL | Age: 64
End: 2024-12-15
Payer: COMMERCIAL

## 2024-12-15 ENCOUNTER — HOSPITAL ENCOUNTER (EMERGENCY)
Facility: HOSPITAL | Age: 64
Discharge: HOME | End: 2024-12-15
Payer: COMMERCIAL

## 2024-12-15 VITALS
RESPIRATION RATE: 20 BRPM | TEMPERATURE: 98.4 F | WEIGHT: 165 LBS | DIASTOLIC BLOOD PRESSURE: 95 MMHG | BODY MASS INDEX: 26.52 KG/M2 | HEART RATE: 78 BPM | SYSTOLIC BLOOD PRESSURE: 159 MMHG | HEIGHT: 66 IN | OXYGEN SATURATION: 93 %

## 2024-12-15 DIAGNOSIS — J45.901 ASTHMA EXACERBATION: ICD-10-CM

## 2024-12-15 DIAGNOSIS — J45.901 EXACERBATION OF ASTHMA, UNSPECIFIED ASTHMA SEVERITY, UNSPECIFIED WHETHER PERSISTENT: Primary | ICD-10-CM

## 2024-12-15 PROCEDURE — 71046 X-RAY EXAM CHEST 2 VIEWS: CPT

## 2024-12-15 PROCEDURE — 99213 OFFICE O/P EST LOW 20 MIN: CPT

## 2024-12-15 PROCEDURE — G0463 HOSPITAL OUTPT CLINIC VISIT: HCPCS | Mod: 25

## 2024-12-15 RX ORDER — PREDNISONE 20 MG/1
TABLET ORAL
Qty: 10 TABLET | Refills: 0 | Status: SHIPPED | OUTPATIENT
Start: 2024-12-15

## 2024-12-15 ASSESSMENT — ENCOUNTER SYMPTOMS
ABDOMINAL PAIN: 0
COUGH: 1
ACTIVITY CHANGE: 0
APPETITE CHANGE: 0
SHORTNESS OF BREATH: 1
DIARRHEA: 0
VOMITING: 0
CHILLS: 0
NAUSEA: 0
FEVER: 0

## 2024-12-15 NOTE — ED PROVIDER NOTES
History     Chief Complaint   Patient presents with    Shortness of Breath     HPI  Niesha Lyle is a 64 year old female who presents to the urgent care with a cough, chest congestion, wheezing, and shortness of breath for the last 2 weeks. Feels similar to asthma symptoms she has had in the past. She denies chest pressure, leg swelling, and fevers. Hx of asthma. Has been using albuterol inhaler. Has not been using Symbicort as it was over 200 dollars to have refilled. No smoker. No recent abx or OTC medications. Prednisone burst 10/19-10/24.     Allergies:  Allergies   Allergen Reactions    Lisinopril     Morphine        Problem List:    Patient Active Problem List    Diagnosis Date Noted    Left hand weakness 08/27/2020     Priority: Medium    Pain of left hand 08/27/2020     Priority: Medium    Stiffness of finger joint of left hand 08/27/2020     Priority: Medium    Stiffness of left wrist joint 08/27/2020     Priority: Medium    Swelling of left hand 08/27/2020     Priority: Medium    Lumbar facet arthropathy 10/21/2015     Priority: Medium    Physical deconditioning 11/12/2014     Priority: Medium    Hyperlipidemia with target LDL less than 130 07/26/2013     Priority: Medium     Overview:    IMO Update      Cubital tunnel syndrome 07/14/2010     Priority: Medium    Raynaud's disease 05/04/2010     Priority: Medium     Overview:   Borderline positive SAVITA      Osteopenia 08/20/2008     Priority: Medium    Thoracic or lumbosacral neuritis or radiculitis 08/20/2008     Priority: Medium     Overview:   IMO Update 10/11      Sciatica 08/13/2008     Priority: Medium    Pain in joint, shoulder region 06/09/2008     Priority: Medium     Overview:    IMO Update 10/11      Enthesopathy of hip region 07/09/2007     Priority: Medium    Restless legs syndrome (RLS) 02/26/2007     Priority: Medium     Overview:   IMO Update 10/11      Migraine without intractable migraine 08/02/2006     Priority: Medium      Overview:   IMO Update 10/11      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    Social History:  Marital Status:   [2]  Social History     Tobacco Use    Smoking status: Never    Smokeless tobacco: Never   Vaping Use    Vaping status: Some Days    Substances: CBD   Substance Use Topics    Alcohol use: No    Drug use: No        Medications:    atorvastatin (LIPITOR) 80 MG tablet  Calcium Carbonate-Vitamin D (CALCIUM + D PO)  DULoxetine (CYMBALTA) 60 MG capsule  gabapentin (NEURONTIN) 100 MG capsule  omeprazole (PRILOSEC) 40 MG DR capsule  traZODone (DESYREL) 100 MG tablet  acetaminophen (TYLENOL) 500 MG tablet  albuterol (PROAIR HFA/PROVENTIL HFA/VENTOLIN HFA) 108 (90 Base) MCG/ACT inhaler  Aspirin Buf,CaCarb-MgCarb-MgO, 81 MG TABS  atenolol (TENORMIN) 50 MG tablet  bisacodyl (DULCOLAX) 5 MG EC tablet  budesonide-formoterol (SYMBICORT) 160-4.5 MCG/ACT Inhaler  fluticasone-vilanterol (BREO ELLIPTA) 200-25 MCG/INH inhaler  ibuprofen (ADVIL/MOTRIN) 200 MG tablet  predniSONE (DELTASONE) 20 MG tablet  tiZANidine (ZANAFLEX) 4 MG capsule  traMADol (ULTRAM) 50 MG tablet          Review of Systems   Constitutional:  Negative for activity change, appetite change, chills and fever.   HENT:  Negative for congestion.    Respiratory:  Positive for cough and shortness of breath.    Cardiovascular:  Negative for chest pain and leg swelling.   Gastrointestinal:  Negative for abdominal pain, diarrhea, nausea and vomiting.  "  All other systems reviewed and are negative.      Physical Exam   BP: 159/95  Pulse: 78  Temp: 98.4  F (36.9  C)  Resp: 20  Height: 167.6 cm (5' 6\")  Weight: 74.8 kg (165 lb)  SpO2: 93 %      Physical Exam  Vitals and nursing note reviewed.   Constitutional:       General: She is not in acute distress.     Appearance: Normal appearance. She is not ill-appearing or toxic-appearing.   HENT:      Right Ear: Tympanic membrane is not erythematous.      Left Ear: Tympanic membrane is not erythematous.      Mouth/Throat:      Mouth: Mucous membranes are moist.      Pharynx: Oropharynx is clear. No oropharyngeal exudate or posterior oropharyngeal erythema.   Cardiovascular:      Rate and Rhythm: Normal rate and regular rhythm.      Heart sounds: Normal heart sounds. No murmur heard.  Pulmonary:      Effort: Pulmonary effort is normal.      Breath sounds: Normal breath sounds. No decreased breath sounds or wheezing.   Neurological:      Mental Status: She is alert.         ED Course        Procedures      No results found for this or any previous visit (from the past 24 hours).    Medications - No data to display    Assessments & Plan (with Medical Decision Making)     I have reviewed the nursing notes.    I have reviewed the findings, diagnosis, plan and need for follow up with the patient.  Niesha Lyle is a 64 year old female who presents to the urgent care with a cough, chest congestion, wheezing, and shortness of breath for the last 2 weeks. Feels similar to asthma symptoms she has had in the past. She denies chest pressure, leg swelling, and fevers. Hx of asthma. Has been using albuterol inhaler. Has not been using Symbicort as it was over 200 dollars to have refilled. No smoker. No recent abx or OTC medications. Prednisone burst 10/19-10/24.     MDM: vital signs normal, afebrile. Non toxic in appearance with no noted distress. Able to speak in complete sentences without dyspnea. Skin pink, warm, and dry. Lungs " clear with no wheezes. Well's negative. No hx of PE or DVT. CXR reviewed with no infiltrates noted, awaiting final read. Will prescribe prednisone burst. Stressed close follow up in clinic supportive measures and strict return precautions discussed. She is in agreement with plan.     (J45.901) Asthma exacerbation  Plan: Prednisone once daily for 5 days. Avoid ibuprofen while taking.   Inhalers as prescribed.   Follow up in the clinic for a recheck.   Return with any new or concerning symptoms. Understanding verbalized.     New Prescriptions    PREDNISONE (DELTASONE) 20 MG TABLET    Take two tablets (= 40mg) each day for 5 (five) days       Final diagnoses:   Asthma exacerbation       12/15/2024   [facility]       Roseanna Fletcher NP  12/15/24 [id number]

## 2024-12-15 NOTE — DISCHARGE INSTRUCTIONS
Prednisone once daily for 5 days. Avoid ibuprofen while taking.   Inhalers as prescribed.   Follow up in the clinic for a recheck.   Return with any new or concerning symptoms.

## 2025-01-09 ENCOUNTER — APPOINTMENT (OUTPATIENT)
Dept: GENERAL RADIOLOGY | Facility: HOSPITAL | Age: 65
End: 2025-01-09
Attending: PHYSICIAN ASSISTANT
Payer: COMMERCIAL

## 2025-01-09 ENCOUNTER — HOSPITAL ENCOUNTER (EMERGENCY)
Facility: HOSPITAL | Age: 65
Discharge: HOME | End: 2025-01-09
Attending: PHYSICIAN ASSISTANT
Payer: COMMERCIAL

## 2025-01-09 VITALS
OXYGEN SATURATION: 99 % | HEART RATE: 78 BPM | TEMPERATURE: 98.7 F | RESPIRATION RATE: 18 BRPM | SYSTOLIC BLOOD PRESSURE: 165 MMHG | DIASTOLIC BLOOD PRESSURE: 98 MMHG

## 2025-01-09 DIAGNOSIS — R07.89 CHEST PRESSURE: ICD-10-CM

## 2025-01-09 DIAGNOSIS — J45.901 ASTHMA WITH ACUTE EXACERBATION, UNSPECIFIED ASTHMA SEVERITY, UNSPECIFIED WHETHER PERSISTENT: Primary | ICD-10-CM

## 2025-01-09 DIAGNOSIS — J45.901 ACUTE ASTHMA EXACERBATION: ICD-10-CM

## 2025-01-09 DIAGNOSIS — R06.09 DYSPNEA ON EXERTION: ICD-10-CM

## 2025-01-09 LAB
ANION GAP SERPL CALCULATED.3IONS-SCNC: 12 MMOL/L (ref 7–15)
ATRIAL RATE - MUSE: 70 BPM
BASOPHILS # BLD AUTO: 0.1 10E3/UL (ref 0–0.2)
BASOPHILS NFR BLD AUTO: 1 %
BUN SERPL-MCNC: 11.6 MG/DL (ref 8–23)
CALCIUM SERPL-MCNC: 9 MG/DL (ref 8.8–10.4)
CHLORIDE SERPL-SCNC: 99 MMOL/L (ref 98–107)
CREAT SERPL-MCNC: 0.92 MG/DL (ref 0.51–0.95)
DIASTOLIC BLOOD PRESSURE - MUSE: NORMAL MMHG
EGFRCR SERPLBLD CKD-EPI 2021: 68 ML/MIN/1.73M2
EOSINOPHIL # BLD AUTO: 0.8 10E3/UL (ref 0–0.7)
EOSINOPHIL NFR BLD AUTO: 9 %
ERYTHROCYTE [DISTWIDTH] IN BLOOD BY AUTOMATED COUNT: 12.6 % (ref 10–15)
GLUCOSE SERPL-MCNC: 92 MG/DL (ref 70–99)
HCO3 SERPL-SCNC: 29 MMOL/L (ref 22–29)
HCT VFR BLD AUTO: 40.6 % (ref 35–47)
HGB BLD-MCNC: 13.4 G/DL (ref 11.7–15.7)
HOLD SPECIMEN: NORMAL
HOLD SPECIMEN: NORMAL
IMM GRANULOCYTES # BLD: 0 10E3/UL
IMM GRANULOCYTES NFR BLD: 0 %
INTERPRETATION ECG - MUSE: NORMAL
LYMPHOCYTES # BLD AUTO: 2.3 10E3/UL (ref 0.8–5.3)
LYMPHOCYTES NFR BLD AUTO: 27 %
MCH RBC QN AUTO: 29.8 PG (ref 26.5–33)
MCHC RBC AUTO-ENTMCNC: 33 G/DL (ref 31.5–36.5)
MCV RBC AUTO: 90 FL (ref 78–100)
MONOCYTES # BLD AUTO: 0.6 10E3/UL (ref 0–1.3)
MONOCYTES NFR BLD AUTO: 7 %
NEUTROPHILS # BLD AUTO: 5 10E3/UL (ref 1.6–8.3)
NEUTROPHILS NFR BLD AUTO: 57 %
NRBC # BLD AUTO: 0 10E3/UL
NRBC BLD AUTO-RTO: 0 /100
P AXIS - MUSE: -7 DEGREES
PLATELET # BLD AUTO: 264 10E3/UL (ref 150–450)
POTASSIUM SERPL-SCNC: 3.3 MMOL/L (ref 3.4–5.3)
PR INTERVAL - MUSE: 96 MS
QRS DURATION - MUSE: 90 MS
QT - MUSE: 412 MS
QTC - MUSE: 444 MS
R AXIS - MUSE: 127 DEGREES
RBC # BLD AUTO: 4.49 10E6/UL (ref 3.8–5.2)
SODIUM SERPL-SCNC: 140 MMOL/L (ref 135–145)
SYSTOLIC BLOOD PRESSURE - MUSE: NORMAL MMHG
T AXIS - MUSE: 144 DEGREES
TROPONIN T SERPL HS-MCNC: <6 NG/L
VENTRICULAR RATE- MUSE: 70 BPM
WBC # BLD AUTO: 8.8 10E3/UL (ref 4–11)

## 2025-01-09 PROCEDURE — 99214 OFFICE O/P EST MOD 30 MIN: CPT | Performed by: PHYSICIAN ASSISTANT

## 2025-01-09 PROCEDURE — 85014 HEMATOCRIT: CPT | Performed by: PHYSICIAN ASSISTANT

## 2025-01-09 PROCEDURE — 80048 BASIC METABOLIC PNL TOTAL CA: CPT | Performed by: PHYSICIAN ASSISTANT

## 2025-01-09 PROCEDURE — 85004 AUTOMATED DIFF WBC COUNT: CPT | Performed by: PHYSICIAN ASSISTANT

## 2025-01-09 PROCEDURE — 250N000013 HC RX MED GY IP 250 OP 250 PS 637: Performed by: PHYSICIAN ASSISTANT

## 2025-01-09 PROCEDURE — 84484 ASSAY OF TROPONIN QUANT: CPT | Performed by: PHYSICIAN ASSISTANT

## 2025-01-09 PROCEDURE — G0463 HOSPITAL OUTPT CLINIC VISIT: HCPCS | Mod: 25

## 2025-01-09 PROCEDURE — 93005 ELECTROCARDIOGRAM TRACING: CPT

## 2025-01-09 PROCEDURE — 36415 COLL VENOUS BLD VENIPUNCTURE: CPT | Performed by: PHYSICIAN ASSISTANT

## 2025-01-09 PROCEDURE — 93010 ELECTROCARDIOGRAM REPORT: CPT | Performed by: INTERNAL MEDICINE

## 2025-01-09 PROCEDURE — 71046 X-RAY EXAM CHEST 2 VIEWS: CPT

## 2025-01-09 RX ORDER — ASPIRIN 81 MG/1
324 TABLET, CHEWABLE ORAL ONCE
Status: COMPLETED | OUTPATIENT
Start: 2025-01-09 | End: 2025-01-09

## 2025-01-09 RX ORDER — PREDNISONE 20 MG/1
TABLET ORAL
Qty: 10 TABLET | Refills: 0 | Status: SHIPPED | OUTPATIENT
Start: 2025-01-09

## 2025-01-09 RX ORDER — AZITHROMYCIN 250 MG/1
TABLET, FILM COATED ORAL
Qty: 6 TABLET | Refills: 0 | Status: SHIPPED | OUTPATIENT
Start: 2025-01-09 | End: 2025-01-14

## 2025-01-09 RX ADMIN — ASPIRIN 81 MG CHEWABLE TABLET 324 MG: 81 TABLET CHEWABLE at 17:12

## 2025-01-09 ASSESSMENT — ENCOUNTER SYMPTOMS
SHORTNESS OF BREATH: 1
FEVER: 0
COUGH: 1
RHINORRHEA: 1
CHILLS: 0

## 2025-01-09 ASSESSMENT — ACTIVITIES OF DAILY LIVING (ADL)
ADLS_ACUITY_SCORE: 41
ADLS_ACUITY_SCORE: 41

## 2025-01-09 NOTE — ED PROVIDER NOTES
History     Chief Complaint   Patient presents with    Cough     HPI  Niesha Lyle is a 64 year old female who presents for three months of dyspnea and chest pressure. Symptoms are worse with exertion.   Does get dizziness   Family history of heart diseases in father, 2 brothers, and grandfather   Has had cough and congestion.  No fevers.  Recently started taking amoxicillin.  She does have a history of asthma has been using her inhaler does not feel like this is helping.    Allergies:  Allergies   Allergen Reactions    Lisinopril     Morphine        Problem List:    Patient Active Problem List    Diagnosis Date Noted    Left hand weakness 08/27/2020     Priority: Medium    Pain of left hand 08/27/2020     Priority: Medium    Stiffness of finger joint of left hand 08/27/2020     Priority: Medium    Stiffness of left wrist joint 08/27/2020     Priority: Medium    Swelling of left hand 08/27/2020     Priority: Medium    Lumbar facet arthropathy 10/21/2015     Priority: Medium    Physical deconditioning 11/12/2014     Priority: Medium    Hyperlipidemia with target LDL less than 130 07/26/2013     Priority: Medium     Overview:    IMO Update      Cubital tunnel syndrome 07/14/2010     Priority: Medium    Raynaud's disease 05/04/2010     Priority: Medium     Overview:   Borderline positive SAVITA      Osteopenia 08/20/2008     Priority: Medium    Thoracic or lumbosacral neuritis or radiculitis 08/20/2008     Priority: Medium     Overview:   IMO Update 10/11      Sciatica 08/13/2008     Priority: Medium    Pain in joint, shoulder region 06/09/2008     Priority: Medium     Overview:    IMO Update 10/11      Enthesopathy of hip region 07/09/2007     Priority: Medium    Restless legs syndrome (RLS) 02/26/2007     Priority: Medium     Overview:   IMO Update 10/11      Migraine without intractable migraine 08/02/2006     Priority: Medium     Overview:   IMO Update 10/11      Other extrapyramidal disease and abnormal  movement disorder 08/02/2006     Priority: Medium    Essential hypertension 06/15/2004     Priority: Medium     Overview:   IMO Update          Past Medical History:    Past Medical History:   Diagnosis Date    Essential hypertension     Hypercholesterolemia        Past Surgical History:    Past Surgical History:   Procedure Laterality Date    BILATERAL SALPINGOOPHORECTOMY  2001    HYSTERECTOMY  1988    INCISIONAL BREAST BIOPSY Right     NECK SURGERY  2023       Family History:    No family history on file.    Social History:  Marital Status:   [2]  Social History     Tobacco Use    Smoking status: Never    Smokeless tobacco: Never   Vaping Use    Vaping status: Some Days    Substances: CBD   Substance Use Topics    Alcohol use: No    Drug use: No        Medications:    acetaminophen (TYLENOL) 500 MG tablet  albuterol (PROAIR HFA/PROVENTIL HFA/VENTOLIN HFA) 108 (90 Base) MCG/ACT inhaler  Aspirin Buf,CaCarb-MgCarb-MgO, 81 MG TABS  atenolol (TENORMIN) 50 MG tablet  atorvastatin (LIPITOR) 80 MG tablet  azithromycin (ZITHROMAX) 250 MG tablet  bisacodyl (DULCOLAX) 5 MG EC tablet  budesonide-formoterol (SYMBICORT) 160-4.5 MCG/ACT Inhaler  Calcium Carbonate-Vitamin D (CALCIUM + D PO)  DULoxetine (CYMBALTA) 60 MG capsule  fluticasone-vilanterol (BREO ELLIPTA) 200-25 MCG/INH inhaler  gabapentin (NEURONTIN) 100 MG capsule  ibuprofen (ADVIL/MOTRIN) 200 MG tablet  omeprazole (PRILOSEC) 40 MG DR capsule  predniSONE (DELTASONE) 20 MG tablet  tiZANidine (ZANAFLEX) 4 MG capsule  traMADol (ULTRAM) 50 MG tablet  traZODone (DESYREL) 100 MG tablet          Review of Systems   Constitutional:  Negative for chills and fever.   HENT:  Positive for rhinorrhea (started today). Negative for ear pain.    Respiratory:  Positive for cough and shortness of breath.    Cardiovascular:  Positive for chest pain.       Physical Exam   BP: (!) 165/98  Pulse: 78  Temp: 98.7  F (37.1  C)  Resp: 18  SpO2: 99 %      Physical Exam  Constitutional:        General: She is not in acute distress.     Appearance: Normal appearance. She is normal weight. She is not ill-appearing, toxic-appearing or diaphoretic.   HENT:      Head: Normocephalic and atraumatic.      Right Ear: External ear normal.      Left Ear: External ear normal.   Eyes:      Extraocular Movements: Extraocular movements intact.      Conjunctiva/sclera: Conjunctivae normal.      Pupils: Pupils are equal, round, and reactive to light.   Cardiovascular:      Rate and Rhythm: Normal rate and regular rhythm.      Pulses: Normal pulses.      Heart sounds: Normal heart sounds.   Pulmonary:      Effort: Pulmonary effort is normal. No respiratory distress.      Breath sounds: Normal breath sounds and air entry.   Musculoskeletal:         General: Normal range of motion.      Right lower leg: No edema.      Left lower leg: No edema.   Skin:     General: Skin is warm and dry.      Coloration: Skin is not jaundiced or pale.   Neurological:      Mental Status: She is alert and oriented to person, place, and time. Mental status is at baseline.      Cranial Nerves: No cranial nerve deficit.   Psychiatric:         Mood and Affect: Mood normal.         ED Course   EKG obtained shows a left fascicular block posteriorly she does have low voltage QRS I do not appreciate any ST elevations.  No T wave inversions.  CBC BMP and troponin were obtained no acute findings were noted on lab work.  Chest x-ray obtained showed no acute cardiopulmonary process.  At this time I discussed with her risk stratification of talking to her primary care doctor about getting a stress test done.  Given that she has had ongoing symptoms for 3 months duration and she has no acute elevation of her troponin after having some increased chest pressure this morning I think it is unlikely that we need to admit her.  I will go ahead and treat as if this is a potential asthma exacerbation azithromycin and prednisone are prescribed.  She can follow-up  with her primary care doctor.  She is to return to the ER if her symptoms worsen in any way.     Procedures           Results for orders placed or performed during the hospital encounter of 01/09/25 (from the past 24 hours)   EKG 12 lead   Result Value Ref Range    Systolic Blood Pressure  mmHg    Diastolic Blood Pressure  mmHg    Ventricular Rate 70 BPM    Atrial Rate 70 BPM    MO Interval 96 ms    QRS Duration 90 ms     ms    QTc 444 ms    P Axis -7 degrees    R AXIS 127 degrees    T Axis 144 degrees    Interpretation ECG       Sinus rhythm with short MO  Low voltage QRS  Left posterior fascicular block  Abnormal ECG  No previous ECGs available     CBC with platelets differential    Narrative    The following orders were created for panel order CBC with platelets differential.  Procedure                               Abnormality         Status                     ---------                               -----------         ------                     CBC with platelets and d...[[id number]]  Abnormal            Final result                 Please view results for these tests on the individual orders.   Basic metabolic panel   Result Value Ref Range    Sodium 140 135 - 145 mmol/L    Potassium 3.3 (L) 3.4 - 5.3 mmol/L    Chloride 99 98 - 107 mmol/L    Carbon Dioxide (CO2) 29 22 - 29 mmol/L    Anion Gap 12 7 - 15 mmol/L    Urea Nitrogen 11.6 8.0 - 23.0 mg/dL    Creatinine 0.92 0.51 - 0.95 mg/dL    GFR Estimate 68 >60 mL/min/1.73m2    Calcium 9.0 8.8 - 10.4 mg/dL    Glucose 92 70 - 99 mg/dL   Troponin T, High Sensitivity   Result Value Ref Range    Troponin T, High Sensitivity <6 <=14 ng/L   CBC with platelets and differential   Result Value Ref Range    WBC Count 8.8 4.0 - 11.0 10e3/uL    RBC Count 4.49 3.80 - 5.20 10e6/uL    Hemoglobin 13.4 11.7 - 15.7 g/dL    Hematocrit 40.6 35.0 - 47.0 %    MCV 90 78 - 100 fL    MCH 29.8 26.5 - 33.0 pg    MCHC 33.0 31.5 - 36.5 g/dL    RDW 12.6 10.0 - 15.0 %    Platelet Count 264  150 - 450 10e3/uL    % Neutrophils 57 %    % Lymphocytes 27 %    % Monocytes 7 %    % Eosinophils 9 %    % Basophils 1 %    % Immature Granulocytes 0 %    NRBCs per 100 WBC 0 <1 /100    Absolute Neutrophils 5.0 1.6 - 8.3 10e3/uL    Absolute Lymphocytes 2.3 0.8 - 5.3 10e3/uL    Absolute Monocytes 0.6 0.0 - 1.3 10e3/uL    Absolute Eosinophils 0.8 (H) 0.0 - 0.7 10e3/uL    Absolute Basophils 0.1 0.0 - 0.2 10e3/uL    Absolute Immature Granulocytes 0.0 <=0.4 10e3/uL    Absolute NRBCs 0.0 10e3/uL   Extra Tube    Narrative    The following orders were created for panel order Extra Tube.  Procedure                               Abnormality         Status                     ---------                               -----------         ------                     Extra Blue Top Tube[[id number]]                              In process                 Extra Red Top Tube[[id number]]                               In process                   Please view results for these tests on the individual orders.   Chest XR,  PA & LAT    Narrative    EXAM: XR CHEST 2 VIEWS  LOCATION: [facility]  DATE: 1/9/2025    INDICATION: chest pressure  COMPARISON: 12/15/2024      Impression    IMPRESSION: Negative chest.       Medications   aspirin (ASA) chewable tablet 324 mg (324 mg Oral $Given 1/9/25 1712)       Assessments & Plan (with Medical Decision Making)     I have reviewed the nursing notes.    I have reviewed the findings, diagnosis, plan and need for follow up with the patient.          New Prescriptions    AZITHROMYCIN (ZITHROMAX) 250 MG TABLET    Take 2 tablets (500 mg) by mouth daily for 1 day, THEN 1 tablet (250 mg) daily for 4 days.    PREDNISONE (DELTASONE) 20 MG TABLET    Take two tablets (= 40mg) each day for 5 (five) days       Final diagnoses:   Dyspnea on exertion   Chest pressure   Acute asthma exacerbation       1/9/2025   [facility]       Priyank Quiroga PA-C  01/09/25 [id number]

## 2025-01-09 NOTE — ED NOTES
JUAN MIGUEL Peters CNP assessed patient in triage and determined patient Urgent Care appropriate. Will be seen in Urgent Care.

## 2025-01-09 NOTE — ED TRIAGE NOTES
Pt presents with c/o having ongoing cough and shortness of breath for few weeks now but states has gotten progressively worse this last week  Pt reports hx of asthma and does take an inhaler   No otc meds taken today

## 2025-01-10 LAB
ATRIAL RATE - MUSE: 70 BPM
DIASTOLIC BLOOD PRESSURE - MUSE: NORMAL MMHG
INTERPRETATION ECG - MUSE: NORMAL
P AXIS - MUSE: -7 DEGREES
PR INTERVAL - MUSE: 96 MS
QRS DURATION - MUSE: 90 MS
QT - MUSE: 412 MS
QTC - MUSE: 444 MS
R AXIS - MUSE: 127 DEGREES
SYSTOLIC BLOOD PRESSURE - MUSE: NORMAL MMHG
T AXIS - MUSE: 144 DEGREES
VENTRICULAR RATE- MUSE: 70 BPM

## 2025-01-10 NOTE — DISCHARGE INSTRUCTIONS
return to the ER if your symptoms worsen follow-up with her primary care doctor about discussion of asthma management and possibility of needing a stress test.

## 2025-02-06 ENCOUNTER — MEDICAL CORRESPONDENCE (OUTPATIENT)
Dept: MRI IMAGING | Facility: HOSPITAL | Age: 65
End: 2025-02-06

## 2025-02-21 ENCOUNTER — HOSPITAL ENCOUNTER (OUTPATIENT)
Dept: MRI IMAGING | Facility: HOSPITAL | Age: 65
Discharge: HOME | End: 2025-02-21
Attending: ORTHOPAEDIC SURGERY | Admitting: ORTHOPAEDIC SURGERY
Payer: COMMERCIAL

## 2025-02-21 DIAGNOSIS — M75.100 ROTATOR CUFF TEAR: ICD-10-CM

## 2025-02-21 PROCEDURE — 73221 MRI JOINT UPR EXTREM W/O DYE: CPT | Mod: LT

## 2025-03-31 ENCOUNTER — APPOINTMENT (OUTPATIENT)
Dept: GENERAL RADIOLOGY | Facility: HOSPITAL | Age: 65
End: 2025-03-31
Attending: EMERGENCY MEDICINE
Payer: COMMERCIAL

## 2025-03-31 ENCOUNTER — HOSPITAL ENCOUNTER (EMERGENCY)
Facility: HOSPITAL | Age: 65
Discharge: HOME | End: 2025-03-31
Attending: INTERNAL MEDICINE
Payer: COMMERCIAL

## 2025-03-31 VITALS
SYSTOLIC BLOOD PRESSURE: 198 MMHG | DIASTOLIC BLOOD PRESSURE: 94 MMHG | HEART RATE: 83 BPM | RESPIRATION RATE: 18 BRPM | TEMPERATURE: 98.2 F | OXYGEN SATURATION: 98 %

## 2025-03-31 DIAGNOSIS — J20.9 ACUTE BRONCHITIS, UNSPECIFIED ORGANISM: ICD-10-CM

## 2025-03-31 LAB
ANION GAP SERPL CALCULATED.3IONS-SCNC: 10 MMOL/L (ref 7–15)
BASOPHILS # BLD AUTO: 0.1 10E3/UL (ref 0–0.2)
BASOPHILS NFR BLD AUTO: 1 %
BUN SERPL-MCNC: 12.9 MG/DL (ref 8–23)
CALCIUM SERPL-MCNC: 9.1 MG/DL (ref 8.8–10.4)
CHLORIDE SERPL-SCNC: 101 MMOL/L (ref 98–107)
CREAT SERPL-MCNC: 0.87 MG/DL (ref 0.51–0.95)
EGFRCR SERPLBLD CKD-EPI 2021: 73 ML/MIN/1.73M2
EOSINOPHIL # BLD AUTO: 0.9 10E3/UL (ref 0–0.7)
EOSINOPHIL NFR BLD AUTO: 9 %
ERYTHROCYTE [DISTWIDTH] IN BLOOD BY AUTOMATED COUNT: 12.8 % (ref 10–15)
GLUCOSE SERPL-MCNC: 112 MG/DL (ref 70–99)
HCO3 SERPL-SCNC: 29 MMOL/L (ref 22–29)
HCT VFR BLD AUTO: 38.9 % (ref 35–47)
HGB BLD-MCNC: 12.5 G/DL (ref 11.7–15.7)
HOLD SPECIMEN: NORMAL
IMM GRANULOCYTES # BLD: 0 10E3/UL
IMM GRANULOCYTES NFR BLD: 0 %
LYMPHOCYTES # BLD AUTO: 3.3 10E3/UL (ref 0.8–5.3)
LYMPHOCYTES NFR BLD AUTO: 34 %
MCH RBC QN AUTO: 29.6 PG (ref 26.5–33)
MCHC RBC AUTO-ENTMCNC: 32.1 G/DL (ref 31.5–36.5)
MCV RBC AUTO: 92 FL (ref 78–100)
MONOCYTES # BLD AUTO: 0.7 10E3/UL (ref 0–1.3)
MONOCYTES NFR BLD AUTO: 8 %
NEUTROPHILS # BLD AUTO: 4.5 10E3/UL (ref 1.6–8.3)
NEUTROPHILS NFR BLD AUTO: 48 %
NRBC # BLD AUTO: 0 10E3/UL
NRBC BLD AUTO-RTO: 0 /100
NT-PROBNP SERPL-MCNC: <36 PG/ML (ref 0–900)
PLATELET # BLD AUTO: 304 10E3/UL (ref 150–450)
POTASSIUM SERPL-SCNC: 3.5 MMOL/L (ref 3.4–5.3)
RBC # BLD AUTO: 4.22 10E6/UL (ref 3.8–5.2)
SODIUM SERPL-SCNC: 140 MMOL/L (ref 135–145)
TROPONIN T SERPL HS-MCNC: 7 NG/L
WBC # BLD AUTO: 9.5 10E3/UL (ref 4–11)

## 2025-03-31 PROCEDURE — 83880 ASSAY OF NATRIURETIC PEPTIDE: CPT | Performed by: INTERNAL MEDICINE

## 2025-03-31 PROCEDURE — 85025 COMPLETE CBC W/AUTO DIFF WBC: CPT | Performed by: INTERNAL MEDICINE

## 2025-03-31 PROCEDURE — 84484 ASSAY OF TROPONIN QUANT: CPT | Performed by: INTERNAL MEDICINE

## 2025-03-31 PROCEDURE — 250N000009 HC RX 250: Performed by: INTERNAL MEDICINE

## 2025-03-31 PROCEDURE — 85041 AUTOMATED RBC COUNT: CPT | Performed by: EMERGENCY MEDICINE

## 2025-03-31 PROCEDURE — 80048 BASIC METABOLIC PNL TOTAL CA: CPT | Performed by: EMERGENCY MEDICINE

## 2025-03-31 PROCEDURE — 250N000013 HC RX MED GY IP 250 OP 250 PS 637: Performed by: INTERNAL MEDICINE

## 2025-03-31 PROCEDURE — 99284 EMERGENCY DEPT VISIT MOD MDM: CPT | Mod: 25

## 2025-03-31 PROCEDURE — 94640 AIRWAY INHALATION TREATMENT: CPT

## 2025-03-31 PROCEDURE — 250N000012 HC RX MED GY IP 250 OP 636 PS 637: Performed by: INTERNAL MEDICINE

## 2025-03-31 PROCEDURE — 99284 EMERGENCY DEPT VISIT MOD MDM: CPT | Performed by: INTERNAL MEDICINE

## 2025-03-31 PROCEDURE — 71046 X-RAY EXAM CHEST 2 VIEWS: CPT

## 2025-03-31 PROCEDURE — 36415 COLL VENOUS BLD VENIPUNCTURE: CPT | Performed by: EMERGENCY MEDICINE

## 2025-03-31 PROCEDURE — 85004 AUTOMATED DIFF WBC COUNT: CPT | Performed by: EMERGENCY MEDICINE

## 2025-03-31 PROCEDURE — 80048 BASIC METABOLIC PNL TOTAL CA: CPT | Performed by: INTERNAL MEDICINE

## 2025-03-31 RX ORDER — IPRATROPIUM BROMIDE AND ALBUTEROL SULFATE 2.5; .5 MG/3ML; MG/3ML
3 SOLUTION RESPIRATORY (INHALATION) ONCE
Status: COMPLETED | OUTPATIENT
Start: 2025-03-31 | End: 2025-03-31

## 2025-03-31 RX ORDER — AZITHROMYCIN 250 MG/1
TABLET, FILM COATED ORAL
Qty: 6 TABLET | Refills: 0 | Status: SHIPPED | OUTPATIENT
Start: 2025-03-31 | End: 2025-04-05

## 2025-03-31 RX ORDER — PREDNISONE 20 MG/1
TABLET ORAL
Qty: 5 TABLET | Refills: 0 | Status: SHIPPED | OUTPATIENT
Start: 2025-03-31 | End: 2025-04-07

## 2025-03-31 RX ORDER — AZITHROMYCIN 250 MG/1
500 TABLET, FILM COATED ORAL ONCE
Status: COMPLETED | OUTPATIENT
Start: 2025-03-31 | End: 2025-03-31

## 2025-03-31 RX ORDER — PREDNISONE 20 MG/1
20 TABLET ORAL ONCE
Status: COMPLETED | OUTPATIENT
Start: 2025-03-31 | End: 2025-03-31

## 2025-03-31 RX ADMIN — IPRATROPIUM BROMIDE AND ALBUTEROL SULFATE 3 ML: .5; 3 SOLUTION RESPIRATORY (INHALATION) at 20:45

## 2025-03-31 RX ADMIN — AZITHROMYCIN DIHYDRATE 500 MG: 250 TABLET ORAL at 22:09

## 2025-03-31 RX ADMIN — PREDNISONE 20 MG: 20 TABLET ORAL at 22:09

## 2025-03-31 ASSESSMENT — ACTIVITIES OF DAILY LIVING (ADL)
ADLS_ACUITY_SCORE: 41

## 2025-03-31 NOTE — ED NOTES
Patient to room 7, settled on cot and call light with in reach.    States she feels like she is having an asthma flair up. States she has had a cough and shortness of breath for 2 weeks. States she can not afford one of her inhalers so it is just getting worse. Denies fevers, nausea, vomiting or diarrhea.

## 2025-03-31 NOTE — ED NOTES
Patient having increased SOB, worried about pneumonia.   Hx of asthma.  A&Ox4. No obvious respiratory distress.

## 2025-03-31 NOTE — ED NOTES
MYESHA Peters CNP assessed patient in triage and determined patient is not Urgent Care appropriate. Will be seen in Emergency Department.

## 2025-04-01 NOTE — ED NOTES
O2 sats maintained >94% with ambulation. HR 80s. Dr. Rutherford aware of elevated BP. Pt states she takes BP meds in the evening and is due for them. Denies headache, dizziness, chest pain, vision changes. Ambulates with steady gait.

## 2025-04-01 NOTE — ED NOTES
Patient reports feeling SOB with any exertion and states that she wasn't able to get her second inhaler (unknown medication) because of the rising cost.

## 2025-04-02 ASSESSMENT — ENCOUNTER SYMPTOMS
DIZZINESS: 0
NUMBNESS: 0
ARTHRALGIAS: 0
SHORTNESS OF BREATH: 1
CHILLS: 0
MYALGIAS: 0
ABDOMINAL PAIN: 0
FEVER: 0
COLOR CHANGE: 0
VOMITING: 0
CONFUSION: 0
FLANK PAIN: 0
ABDOMINAL DISTENTION: 0
WOUND: 0
BACK PAIN: 0
DIAPHORESIS: 0
PALPITATIONS: 0
HEMATURIA: 0
NECK PAIN: 0
HEADACHES: 0
WHEEZING: 1
VOICE CHANGE: 0
DYSURIA: 0
NAUSEA: 0
CHEST TIGHTNESS: 0
BLOOD IN STOOL: 0
COUGH: 1
ANAL BLEEDING: 0
LIGHT-HEADEDNESS: 0
NECK STIFFNESS: 0

## 2025-04-03 NOTE — ED PROVIDER NOTES
History     Chief Complaint   Patient presents with    Shortness of Breath     The history is provided by the patient.   Shortness of Breath  Severity:  Moderate  Onset quality:  Gradual  Duration:  5 days  Timing:  Constant  Progression:  Worsening  Chronicity:  New  Context: URI    Associated symptoms: cough and wheezing    Associated symptoms: no abdominal pain, no chest pain, no diaphoresis, no fever, no headaches, no neck pain, no rash and no vomiting          Allergies:  Allergies   Allergen Reactions    Lisinopril     Morphine        Problem List:    Patient Active Problem List    Diagnosis Date Noted    Left hand weakness 08/27/2020     Priority: Medium    Pain of left hand 08/27/2020     Priority: Medium    Stiffness of finger joint of left hand 08/27/2020     Priority: Medium    Stiffness of left wrist joint 08/27/2020     Priority: Medium    Swelling of left hand 08/27/2020     Priority: Medium    Lumbar facet arthropathy 10/21/2015     Priority: Medium    Physical deconditioning 11/12/2014     Priority: Medium    Hyperlipidemia with target LDL less than 130 07/26/2013     Priority: Medium     Overview:    IMO Update      Cubital tunnel syndrome 07/14/2010     Priority: Medium    Raynaud's disease 05/04/2010     Priority: Medium     Overview:   Borderline positive SAVITA      Osteopenia 08/20/2008     Priority: Medium    Thoracic or lumbosacral neuritis or radiculitis 08/20/2008     Priority: Medium     Overview:   IMO Update 10/11      Sciatica 08/13/2008     Priority: Medium    Pain in joint, shoulder region 06/09/2008     Priority: Medium     Overview:    IMO Update 10/11      Enthesopathy of hip region 07/09/2007     Priority: Medium    Restless legs syndrome (RLS) 02/26/2007     Priority: Medium     Overview:   IMO Update 10/11      Migraine without intractable migraine 08/02/2006     Priority: Medium     Overview:   IMO Update 10/11      Other extrapyramidal disease and abnormal movement disorder  08/02/2006     Priority: Medium    Essential hypertension 06/15/2004     Priority: Medium     Overview:   IMO Update          Past Medical History:    Past Medical History:   Diagnosis Date    Essential hypertension     Hypercholesterolemia        Past Surgical History:    Past Surgical History:   Procedure Laterality Date    BILATERAL SALPINGOOPHORECTOMY  2001    HYSTERECTOMY  1988    INCISIONAL BREAST BIOPSY Right     NECK SURGERY  2023       Family History:    No family history on file.    Social History:  Marital Status:   [2]  Social History     Tobacco Use    Smoking status: Never    Smokeless tobacco: Never   Vaping Use    Vaping status: Some Days    Substances: CBD   Substance Use Topics    Alcohol use: No    Drug use: No        Medications:    azithromycin (ZITHROMAX Z-ELAYNE) 250 MG tablet  predniSONE (DELTASONE) 20 MG tablet  acetaminophen (TYLENOL) 500 MG tablet  albuterol (PROAIR HFA/PROVENTIL HFA/VENTOLIN HFA) 108 (90 Base) MCG/ACT inhaler  Aspirin Buf,CaCarb-MgCarb-MgO, 81 MG TABS  atenolol (TENORMIN) 50 MG tablet  atorvastatin (LIPITOR) 80 MG tablet  bisacodyl (DULCOLAX) 5 MG EC tablet  budesonide-formoterol (SYMBICORT) 160-4.5 MCG/ACT Inhaler  Calcium Carbonate-Vitamin D (CALCIUM + D PO)  DULoxetine (CYMBALTA) 60 MG capsule  fluticasone-vilanterol (BREO ELLIPTA) 200-25 MCG/INH inhaler  gabapentin (NEURONTIN) 100 MG capsule  ibuprofen (ADVIL/MOTRIN) 200 MG tablet  omeprazole (PRILOSEC) 40 MG DR capsule  predniSONE (DELTASONE) 20 MG tablet  tiZANidine (ZANAFLEX) 4 MG capsule  traMADol (ULTRAM) 50 MG tablet  traZODone (DESYREL) 100 MG tablet          Review of Systems   Constitutional:  Negative for chills, diaphoresis and fever.   HENT:  Negative for voice change.    Eyes:  Negative for visual disturbance.   Respiratory:  Positive for cough, shortness of breath and wheezing. Negative for chest tightness.    Cardiovascular:  Negative for chest pain, palpitations and leg swelling.    Gastrointestinal:  Negative for abdominal distention, abdominal pain, anal bleeding, blood in stool, nausea and vomiting.   Genitourinary:  Negative for decreased urine volume, dysuria, flank pain and hematuria.   Musculoskeletal:  Negative for arthralgias, back pain, gait problem, myalgias, neck pain and neck stiffness.   Skin:  Negative for color change, pallor, rash and wound.   Neurological:  Negative for dizziness, syncope, light-headedness, numbness and headaches.   Psychiatric/Behavioral:  Negative for confusion and suicidal ideas.        Physical Exam   BP: (!) 151/90  Pulse: 78  Temp: 98.2  F (36.8  C)  Resp: 22  SpO2: 96 %      Physical Exam  Vitals and nursing note reviewed.   Constitutional:       Appearance: She is well-developed.   HENT:      Head: Normocephalic and atraumatic.      Mouth/Throat:      Pharynx: No oropharyngeal exudate.   Eyes:      Conjunctiva/sclera: Conjunctivae normal.      Pupils: Pupils are equal, round, and reactive to light.   Neck:      Thyroid: No thyromegaly.      Vascular: No JVD.      Trachea: No tracheal deviation.   Cardiovascular:      Rate and Rhythm: Normal rate and regular rhythm.      Heart sounds: Normal heart sounds. No murmur heard.     No friction rub. No gallop.   Pulmonary:      Effort: Pulmonary effort is normal. No respiratory distress.      Breath sounds: No stridor. Examination of the right-upper field reveals wheezing. Examination of the left-upper field reveals wheezing. Examination of the right-middle field reveals wheezing. Examination of the left-middle field reveals wheezing. Examination of the right-lower field reveals wheezing. Examination of the left-lower field reveals wheezing. Wheezing present. No rales.   Chest:      Chest wall: No tenderness.   Abdominal:      General: Bowel sounds are normal. There is no distension.      Palpations: Abdomen is soft. There is no mass.      Tenderness: There is no abdominal tenderness. There is no guarding or  rebound.   Musculoskeletal:         General: No tenderness. Normal range of motion.      Cervical back: Normal range of motion and neck supple.   Lymphadenopathy:      Cervical: No cervical adenopathy.   Skin:     General: Skin is warm and dry.      Coloration: Skin is not pale.      Findings: No erythema or rash.   Neurological:      Mental Status: She is alert and oriented to person, place, and time.   Psychiatric:         Behavior: Behavior normal.         ED Course        Procedures                  No results found for this or any previous visit (from the past 24 hours).    Medications   ipratropium - albuterol 0.5 mg/2.5 mg/3 mL (DUONEB) neb solution 3 mL (3 mLs Nebulization $Given 3/31/25 2045)   predniSONE (DELTASONE) tablet 20 mg (20 mg Oral $Given 3/31/25 2209)   azithromycin (ZITHROMAX) tablet 500 mg (500 mg Oral $Given 3/31/25 2209)       Assessments & Plan (with Medical Decision Making)   Productive cough, wheezing and sob for 5 days  EKG NSR  CXR negative    Labs reviewed    Acute bronchitis  After neb treatment felt better, already has inhaler at home, prednisone and zithro started    Uncontrolled HTN: pt has not taken PM BP med yet I advised to take night med at home and follow-up with pCP for adjusting medication  She understood roslyn agreed.   I have reviewed the nursing notes.    I have reviewed the findings, diagnosis, plan and need for follow up with the patient.          Discharge Medication List as of 3/31/2025 10:11 PM        START taking these medications    Details   azithromycin (ZITHROMAX Z-ELAYNE) 250 MG tablet Two tablets on the first day, then one tablet daily for the next 4 days, Disp-6 tablet, R-0, E-Prescribe      !! predniSONE (DELTASONE) 20 MG tablet 1 tab daily for 3 days, then 1/2 tab daily for 4 days, Disp-5 tablet, R-0, E-Prescribe       !! - Potential duplicate medications found. Please discuss with provider.          Final diagnoses:   Acute bronchitis, unspecified organism        3/31/2025   [facility]       Baljinder Rutherford MD  04/02/25 1934

## 2025-07-17 DIAGNOSIS — F51.01 PRIMARY INSOMNIA: Primary | ICD-10-CM

## 2025-07-17 DIAGNOSIS — G89.4 CHRONIC PAIN SYNDROME: ICD-10-CM

## 2025-07-17 RX ORDER — DULOXETIN HYDROCHLORIDE 60 MG/1
60 CAPSULE, DELAYED RELEASE ORAL DAILY
Qty: 90 CAPSULE | Refills: 0 | Status: SHIPPED | OUTPATIENT
Start: 2025-07-17

## 2025-07-17 RX ORDER — TRAZODONE HYDROCHLORIDE 100 MG/1
100 TABLET ORAL AT BEDTIME
Qty: 30 TABLET | Refills: 0 | Status: SHIPPED | OUTPATIENT
Start: 2025-07-17

## 2025-07-17 NOTE — TELEPHONE ENCOUNTER
DULoxetine (CYMBALTA) 60 MG capsule       Last Written Prescription Date:  Historical medication   Last Office Visit: 4/4/25  Future Office visit:       Routing refill request to provider for review/approval because:  Medication is reported/historical      traZODone (DESYREL) 100 MG tablet       Last Written Prescription Date:  Historical   Last Office Visit: 4/4/25  Future Office visit:       Routing refill request to provider for review/approval because:  Medication is reported/historical

## 2025-07-21 ENCOUNTER — OFFICE VISIT (OUTPATIENT)
Dept: CHIROPRACTIC MEDICINE | Facility: OTHER | Age: 65
End: 2025-07-21
Attending: CHIROPRACTOR
Payer: COMMERCIAL

## 2025-07-21 DIAGNOSIS — M54.50 ACUTE BILATERAL LOW BACK PAIN WITHOUT SCIATICA: ICD-10-CM

## 2025-07-21 DIAGNOSIS — M99.03 SEGMENTAL AND SOMATIC DYSFUNCTION OF LUMBAR REGION: Primary | ICD-10-CM

## 2025-07-21 DIAGNOSIS — M99.02 SEGMENTAL AND SOMATIC DYSFUNCTION OF THORACIC REGION: ICD-10-CM

## 2025-07-21 PROCEDURE — 98940 CHIROPRACT MANJ 1-2 REGIONS: CPT | Mod: AT | Performed by: CHIROPRACTOR

## 2025-07-23 NOTE — PROGRESS NOTES
Subjective Finding:    Chief compalint: Patient presents with:  Back Pain , Pain Scale: 3/10, Intensity: dull, Duration: 1 weeks, Radiating:  bilateral buttock.    Date of injury:     Activities that the pain restricts:   Home/household/hobbies/social activities: Yes.  Work duties: Yes.  Sleep: Yes.  Makes symptoms better: rest.  Makes symptoms worse: lumbar extension.  Have you seen anyone else for the symptoms? No.  Work related: No.  Automobile related injury: No.    Objective and Assessment:    Posture Analysis:   High shoulder: .  Head tilt: .  High iliac crest: .  Head carriage: neutral.  Thoracic Kyphosis: neutral.  Lumbar Lordosis: forward.    Lumbar Range of Motion: extension decreased.  Cervical Range of Motion: .  Thoracic Range of Motion: .  Extremity Range of Motion: .    Palpation:   Quad lumb: bilateral, referred pain: no    Segmental dysfunction pre-treatment and treatment area: T4 and L5.    Assessment post-treatment:  Cervical: .  Thoracic: ROM increased.  Lumbar: ROM increased.    Comments: .      Complicating Factors: .    Procedure(s):  CMT:  32954 Chiropractic manipulative treatment 1-2 regions performed   Thoracic: Diversified, See above for level, Prone and Lumbar: Diversified, See above for level, Side posture    Modalities:  None performed this visit    Therapeutic procedures:  None    Plan:  Treatment plan: PRN.  Instructed patient: walk 10 minutes.  Short term goals: increase ROM.  Long term goals: increase ADL.  Prognosis: very good.

## 2025-08-17 DIAGNOSIS — F51.01 PRIMARY INSOMNIA: ICD-10-CM

## 2025-08-18 RX ORDER — TRAZODONE HYDROCHLORIDE 100 MG/1
100 TABLET ORAL AT BEDTIME
Qty: 30 TABLET | Refills: 0 | Status: SHIPPED | OUTPATIENT
Start: 2025-08-18

## (undated) RX ORDER — LIDOCAINE HYDROCHLORIDE AND EPINEPHRINE 10; 10 MG/ML; UG/ML
INJECTION, SOLUTION INFILTRATION; PERINEURAL
Status: DISPENSED
Start: 2023-12-18

## (undated) RX ORDER — LIDOCAINE HYDROCHLORIDE 10 MG/ML
INJECTION, SOLUTION INFILTRATION; PERINEURAL
Status: DISPENSED
Start: 2023-12-18